# Patient Record
Sex: FEMALE | NOT HISPANIC OR LATINO | Employment: OTHER | URBAN - METROPOLITAN AREA
[De-identification: names, ages, dates, MRNs, and addresses within clinical notes are randomized per-mention and may not be internally consistent; named-entity substitution may affect disease eponyms.]

---

## 2017-01-23 ENCOUNTER — TRANSFERRED RECORDS (OUTPATIENT)
Dept: HEALTH INFORMATION MANAGEMENT | Facility: CLINIC | Age: 72
End: 2017-01-23

## 2017-02-15 ENCOUNTER — OFFICE VISIT (OUTPATIENT)
Dept: URGENT CARE | Facility: URGENT CARE | Age: 72
End: 2017-02-15
Payer: COMMERCIAL

## 2017-02-15 VITALS
DIASTOLIC BLOOD PRESSURE: 78 MMHG | WEIGHT: 191 LBS | TEMPERATURE: 98 F | OXYGEN SATURATION: 91 % | HEART RATE: 85 BPM | SYSTOLIC BLOOD PRESSURE: 156 MMHG | BODY MASS INDEX: 31.82 KG/M2
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
## 2017-02-15 DIAGNOSIS — S51.851A CAT BITE OF RIGHT FOREARM, INITIAL ENCOUNTER: Primary | ICD-10-CM

## 2017-02-15 PROCEDURE — 90471 IMMUNIZATION ADMIN: CPT | Performed by: NURSE PRACTITIONER

## 2017-02-15 PROCEDURE — 99214 OFFICE O/P EST MOD 30 MIN: CPT | Mod: 25 | Performed by: NURSE PRACTITIONER

## 2017-02-15 PROCEDURE — 90715 TDAP VACCINE 7 YRS/> IM: CPT | Performed by: NURSE PRACTITIONER

## 2017-02-15 RX ORDER — IBUPROFEN 400 MG/1
400 TABLET, FILM COATED ORAL EVERY 6 HOURS PRN
Qty: 28 TABLET | Refills: 0 | Status: SHIPPED | OUTPATIENT
Start: 2017-02-15 | End: 2017-02-22

## 2017-02-15 ASSESSMENT — PAIN SCALES - GENERAL: PAINLEVEL: WORST PAIN (10)

## 2017-02-15 NOTE — MR AVS SNAPSHOT
After Visit Summary   2/15/2017    Negin Jonas    MRN: [id number]           Patient Information     Date Of Birth          1945        Visit Information        Provider Department      2/15/2017 4:10 PM Kiesha Desai NP [facility]        Today's Diagnoses     Cat bite of right forearm, initial encounter    -  1      Care Instructions      Cat Bite    A cat bite can cause a wound deep enough to break the skin. In such cases, the wound is cleaned and then closed. Sometimes, the wound is not closed completely. This is so that fluid can drain if the wound becomes infected. In addition to wound care, a tetanus shot may be given, if needed.  Home Care    Wash your hands well with soap and warm water before and after caring for the wound. This helps lower the risk of infection.    Care for the wound as directed. If a dressing was applied to the wound, be sure to change it as directed.    If the wound bleeds, place a clean, soft cloth on the wound. Then firmly apply pressure until the bleeding stops. This may take up to 5 minutes. Do not release the pressure and look at the wound during this time.    Most wounds heal within 10 days. But an infection can occur even with proper treatment. So be sure to check the wound daily for signs of infection (see below).    Antibiotics may be prescribed. These help prevent or treat infection. If you re given antibiotics, take them as directed. Also be sure to complete the medications.  Rabies Prevention   Rabies is a virus that can be carried in certain animals. These can include domestic animals such as cats and dogs. Pets fully vaccinated against rabies (2 shots) are at very low risk of infection. But because human rabies is almost always fatal, any biting pet should be confined for 10 days as an extra precaution. In general, if there is a risk for rabies, the following steps may need to be taken:    If someone s pet cat has bitten you, it  should be kept in a secure area for the next 10 days to watch for signs of illness. (If the pet owner won t allow this, contact your local animal control center.) If the cat becomes ill or dies during that time, contact your local animal control center at once so the animal may be tested for rabies. If the cat stays healthy for the next 10 days, there is no danger of rabies in the animal or you.    If a stray cat bit you, contact your local animal control center. They can give information on capture, quarantine, and animal rabies testing.    If you can t locate the animal that bit you in the next 2 days, and if rabies exists in your region, you may need to receive the rabies vaccine series. Call your health care provider right away. Or, return to the emergency department promptly.    All animal bites should be reported to the local animal control center. If you were not given a form to fill out, you can report this yourself.  Follow-up care  Follow up with your health care provider, or as directed.  When to seek medical advice  Call your health care provider right away if any of these occur:    Signs of infection:    Spreading redness or warmth from the wound    Increased pain or swelling    Fever of 100.4 F (38 C) or higher, or as directed by your health care provider    Colored fluid or pus draining from the wound    Signs of rabies infection:    Headache    Confusion    Strange behavior    Seizure    Decreased ability to move any body part near the bite area    Bleeding that cannot be stopped after 5 minutes of firm pressure              1594-6496 [employer]. [address] [place] [address] 10428. All rights reserved. This information is not intended as a substitute for professional medical care. Always follow your healthcare professional's instructions.              Follow-ups after your visit        Who to contact     If you have questions or need follow up information about today's clinic  "visit or your schedule please contact [facility] directly at [phone].  Normal or non-critical lab and imaging results will be communicated to you by MyChart, letter or phone within 4 business days after the clinic has received the results. If you do not hear from us within 7 days, please contact the clinic through [employer] or phone. If you have a critical or abnormal lab result, we will notify you by phone as soon as possible.  Submit refill requests through [employer] or call your pharmacy and they will forward the refill request to us. Please allow 3 business days for your refill to be completed.          Additional Information About Your Visit        MyChart Information     [employer] lets you send messages to your doctor, view your test results, renew your prescriptions, schedule appointments and more. To sign up, go to [email or web address] . Click on \"Log in\" on the left side of the screen, which will take you to the Welcome page. Then click on \"Sign up Now\" on the right side of the page.     You will be asked to enter the access code listed below, as well as some personal information. Please follow the directions to create your username and password.     Your access code is: [email or web address]  Expires: 2017  9:23 AM     Your access code will  in 90 days. If you need help or a new code, please call your [facility] clinic or [phone].        Care EveryWhere ID     This is your Care EveryWhere ID. This could be used by other organizations to access your [facility] medical records  [id number]        Your Vitals Were     Pulse Temperature Pulse Oximetry BMI (Body Mass Index)          85 98  F (36.7  C) (Oral) 91% 31.82 kg/m2         Blood Pressure from Last 3 Encounters:   02/15/17 156/78   16 126/78   16 136/88    Weight from Last 3 Encounters:   02/15/17 191 lb (86.6 kg)   16 197 lb (89.4 kg)   16 191 lb (86.6 kg)              We Performed the Following     " TDAP (ADACEL AGES 11-64)          Today's Medication Changes          These changes are accurate as of: 2/15/17  6:57 PM.  If you have any questions, ask your nurse or doctor.               Start taking these medicines.        Dose/Directions    amoxicillin-clavulanate 875-125 MG per tablet   Commonly known as:  AUGMENTIN   Used for:  Cat bite of right forearm, initial encounter   Started by:  Kiesha Desai NP        Dose:  1 tablet   Take 1 tablet by mouth 2 times daily for 7 days   Quantity:  14 tablet   Refills:  0       ibuprofen 400 MG tablet   Commonly known as:  ADVIL/MOTRIN   Used for:  Cat bite of right forearm, initial encounter   Started by:  Kiesha Desai NP        Dose:  400 mg   Take 1 tablet (400 mg) by mouth every 6 hours as needed for moderate pain   Quantity:  28 tablet   Refills:  0            Where to get your medicines      These medications were sent to [id number] - [place], [place] - [address]  [address] [address]     Phone:  [phone]     amoxicillin-clavulanate 875-125 MG per tablet    ibuprofen 400 MG tablet                Primary Care Provider Office Phone # Fax #    Cassy Macario PA-C [phone]       [facility] [address]  [facility] [address]        Thank you!     Thank you for choosing [facility]  for your care. Our goal is always to provide you with excellent care. Hearing back from our patients is one way we can continue to improve our services. Please take a few minutes to complete the written survey that you may receive in the mail after your visit with us. Thank you!             Your Updated Medication List - Protect others around you: Learn how to safely use, store and throw away your medicines at www.disposemymeds.org.          This list is accurate as of: 2/15/17  6:57 PM.  Always use your most recent med list.                   Brand Name Dispense Instructions for use     amoxicillin-clavulanate 875-125 MG per tablet    AUGMENTIN    14 tablet    Take 1 tablet by mouth 2 times daily for 7 days       aspirin 81 MG EC tablet    ASPIRIN ADULT LOW STRENGTH    90 tablet    Take 1 tablet (81 mg) by mouth daily       citalopram 20 MG tablet    celeXA    90 tablet    Take 1 tablet (20 mg) by mouth daily For anxiety.       fluticasone 50 MCG/ACT spray    FLONASE    16 g    Spray 1-2 sprays into both nostrils daily For nasal allergies       ibuprofen 400 MG tablet    ADVIL/MOTRIN    28 tablet    Take 1 tablet (400 mg) by mouth every 6 hours as needed for moderate pain       lamoTRIgine 200 MG tablet    LaMICtal     Reported on 2/15/2017       omeprazole 20 MG CR capsule    priLOSEC    180 capsule    Take 2 capsules (40 mg) by mouth daily       triamcinolone 0.1 % cream    KENALOG    80 g    APPLY TOPICALLY TWICE DAILY as needed for a maximum of 14 days.

## 2017-02-16 NOTE — PATIENT INSTRUCTIONS
Cat Bite    A cat bite can cause a wound deep enough to break the skin. In such cases, the wound is cleaned and then closed. Sometimes, the wound is not closed completely. This is so that fluid can drain if the wound becomes infected. In addition to wound care, a tetanus shot may be given, if needed.  Home Care    Wash your hands well with soap and warm water before and after caring for the wound. This helps lower the risk of infection.    Care for the wound as directed. If a dressing was applied to the wound, be sure to change it as directed.    If the wound bleeds, place a clean, soft cloth on the wound. Then firmly apply pressure until the bleeding stops. This may take up to 5 minutes. Do not release the pressure and look at the wound during this time.    Most wounds heal within 10 days. But an infection can occur even with proper treatment. So be sure to check the wound daily for signs of infection (see below).    Antibiotics may be prescribed. These help prevent or treat infection. If you re given antibiotics, take them as directed. Also be sure to complete the medications.  Rabies Prevention   Rabies is a virus that can be carried in certain animals. These can include domestic animals such as cats and dogs. Pets fully vaccinated against rabies (2 shots) are at very low risk of infection. But because human rabies is almost always fatal, any biting pet should be confined for 10 days as an extra precaution. In general, if there is a risk for rabies, the following steps may need to be taken:    If someone s pet cat has bitten you, it should be kept in a secure area for the next 10 days to watch for signs of illness. (If the pet owner won t allow this, contact your local animal control center.) If the cat becomes ill or dies during that time, contact your local animal control center at once so the animal may be tested for rabies. If the cat stays healthy for the next 10 days, there is no danger of rabies in the  animal or you.    If a stray cat bit you, contact your local animal control center. They can give information on capture, quarantine, and animal rabies testing.    If you can t locate the animal that bit you in the next 2 days, and if rabies exists in your region, you may need to receive the rabies vaccine series. Call your health care provider right away. Or, return to the emergency department promptly.    All animal bites should be reported to the local animal control center. If you were not given a form to fill out, you can report this yourself.  Follow-up care  Follow up with your health care provider, or as directed.  When to seek medical advice  Call your health care provider right away if any of these occur:    Signs of infection:    Spreading redness or warmth from the wound    Increased pain or swelling    Fever of 100.4 F (38 C) or higher, or as directed by your health care provider    Colored fluid or pus draining from the wound    Signs of rabies infection:    Headache    Confusion    Strange behavior    Seizure    Decreased ability to move any body part near the bite area    Bleeding that cannot be stopped after 5 minutes of firm pressure              [id number] [employer]. [address] [place] [address]60. All rights reserved. This information is not intended as a substitute for professional medical care. Always follow your healthcare professional's instructions.

## 2017-02-16 NOTE — NURSING NOTE
"Chief Complaint   Patient presents with     Cat Bite     this morning and got worse- sister's cat and update on shots       Initial /78 (BP Location: Left arm, Patient Position: Chair, Cuff Size: Adult Regular)  Pulse 85  Temp 98  F (36.7  C) (Oral)  Wt 191 lb (86.6 kg)  SpO2 91%  BMI 31.82 kg/m2 Estimated body mass index is 31.82 kg/(m^2) as calculated from the following:    Height as of 11/25/16: 5' 4.96\" (1.65 m).    Weight as of this encounter: 191 lb (86.6 kg).  Medication Reconciliation: complete   Aga Luevano CMA      "

## 2017-02-16 NOTE — PROGRESS NOTES
SUBJECTIVE:                                                    Negin Jonas is a 71 year old female who presents to clinic today for the following health issues:      Cat bite      Duration: today    Description (location/character/radiation): right forearm    Intensity:  10/10    Accompanying signs and symptoms: swelling up the arm    History (similar episodes/previous evaluation): None    Precipitating or alleviating factors: None    Therapies tried and outcome: ice josiah            Allergies   Allergen Reactions     Codeine Sulfate        Past Medical History   Diagnosis Date     Advanced directives, counseling/discussion 3/7/2012     Anxiety attack 2006     EEG normal, improved with lamictal     Chronic rhinitis 1/31/2011     Closed head injury 2004     subarachnoid hemorrage/cerebral contusion with sequellae of significant diminished short term memory, word finding difficulties and emotional lability, was in coma for 2 months, on seizure prophylaxsis with lamictal, f/b neuro annually     Generalized anxiety disorder      Major depression      Major depression in complete remission (H) 1/31/2011     was seen by psychiatrist in past, has tried Effexor and Seroquel and Hiram's Wart     Migraine headache      resolved s/p closed head injury     Panic attacks      Psoriasis 1/31/2011     Short-term memory loss 2004     secondary to closed head injury         Current Outpatient Prescriptions on File Prior to Visit:  citalopram (CELEXA) 20 MG tablet Take 1 tablet (20 mg) by mouth daily For anxiety.   omeprazole (PRILOSEC) 20 MG capsule Take 2 capsules (40 mg) by mouth daily   triamcinolone (KENALOG) 0.1 % cream APPLY TOPICALLY TWICE DAILY as needed for a maximum of 14 days.   fluticasone (FLONASE) 50 MCG/ACT nasal spray Spray 1-2 sprays into both nostrils daily For nasal allergies   aspirin (ASPIRIN ADULT LOW STRENGTH) 81 MG EC tablet Take 1 tablet (81 mg) by mouth daily   lamoTRIgine (LAMICTAL) 200 MG tablet  Reported on 2/15/2017     No current facility-administered medications on file prior to visit.     Social History   Substance Use Topics     Smoking status: Former Smoker     Smokeless tobacco: Never Used      Comment: Quit 15 years ago.     Alcohol use No       ROS:  General: negative for fever  SKIN: + as above    Physcial Exam:  /78 (BP Location: Left arm, Patient Position: Chair, Cuff Size: Adult Regular)  Pulse 85  Temp 98  F (36.7  C) (Oral)  Wt 191 lb (86.6 kg)  SpO2 91%  BMI 31.82 kg/m2    GENERAL: alert, no acute distress  EYES: conjunctival clear  RESP: Regular breathing rate  NEURO: awake .  SKIN: multiple scratches on the right forearm, very tender to touch, swollen and erythema    ASSESSMENT:    ICD-10-CM    1. Cat bite of right forearm, initial encounter S51.851A amoxicillin-clavulanate (AUGMENTIN) 875-125 MG per tablet    W55.01XA TDAP (ADACEL AGES 11-64)     ibuprofen (ADVIL/MOTRIN) 400 MG tablet       PLAN:  Patient reports that cat had a rabies vaccinations/ shots before  See today's orders.  Follow-up with primary clinic if not improving.  Advised about symptoms which might herald more serious problems.    Kiesha Desai  [facility]-BC  Family Nurse Practitoner

## 2017-04-28 DIAGNOSIS — F41.1 GENERALIZED ANXIETY DISORDER: ICD-10-CM

## 2017-04-28 DIAGNOSIS — L40.9 PSORIASIS: ICD-10-CM

## 2017-04-28 NOTE — TELEPHONE ENCOUNTER
triamcinolone (KENALOG) 0.1 % cream      Last Written Prescription Date: 11/10/16  Last Fill Quantity: 80g,  # refills: 0   Last Office Visit with [facility], [facility] or [facility] prescribing provider: 11/25/16                                               [facility]

## 2017-05-01 NOTE — TELEPHONE ENCOUNTER
aspirin (ASPIRIN ADULT LOW STRENGTH) 81 MG EC tablet      Last Written Prescription Date: 07/27/16  Last Fill Quantity: 90, # refills: 1  Last Office Visit with [facility], [facility] or [facility] prescribing provider: 11/25/16        BP Readings from Last 3 Encounters:   02/15/17 156/78   11/25/16 126/78   08/29/16 136/88     Lab Results   Component Value Date    AST 16 07/28/2016     Lab Results   Component Value Date    ALT 25 07/28/2016     Creatinine   Date Value Ref Range Status   07/28/2016 0.76 0.52 - 1.04 mg/dL Final

## 2017-05-02 NOTE — TELEPHONE ENCOUNTER
Routing refill request to provider for review/approval because:  Has been over a year since seen for psoriasis    VLg [place], Clinical RN Laura Dia.

## 2017-05-03 RX ORDER — TRIAMCINOLONE ACETONIDE 1 MG/G
CREAM TOPICAL
Qty: 80 G | Refills: 0 | Status: SHIPPED | OUTPATIENT
Start: 2017-05-03 | End: 2018-01-19

## 2017-10-20 DIAGNOSIS — F33.41 RECURRENT MAJOR DEPRESSIVE DISORDER, IN PARTIAL REMISSION (H): ICD-10-CM

## 2017-10-20 DIAGNOSIS — F41.1 GENERALIZED ANXIETY DISORDER: ICD-10-CM

## 2017-10-20 NOTE — LETTER
Negin Jonas  [address]  [facility] [address]          10/26/17      Dear Negin Jonas        At [facility] we care about your health and are committed to providing quality patient care. Regular appointments are a vital part of the care and management of your health and can help prevent many of the complications that can occur.      It has come to our attention that you were given a fernie refill on your medication and an office visit is due before your next refill is needed. Please call [facility] at [phone] soon to schedule your appointment.    If you have transferred care to another clinic please call to inform us so that we do not continue to send you reminder letters.      Sincerely,      [facility] Care Team

## 2017-10-26 RX ORDER — CITALOPRAM HYDROBROMIDE 20 MG/1
TABLET ORAL
Qty: 30 TABLET | Refills: 0 | Status: SHIPPED | OUTPATIENT
Start: 2017-10-26 | End: 2017-11-13

## 2017-10-26 NOTE — TELEPHONE ENCOUNTER
This writer attempted to contact Negin on 10/26/17      Reason for call Left voicemail message that fernie refill given and an appointment needs to be scheduled before the next refill is needed and left message to return call.      If patient calls back:   Schedule Office Visit appointment within 2 weeks with primary care.    Sent letter as well.    Brittany Kathleen MA

## 2017-10-26 NOTE — TELEPHONE ENCOUNTER
Medication is being filled for 1 time refill only due to:  Patient needs to be seen because due for appointment for further refills..   send letter.  Katia Benitez RN

## 2017-11-13 ENCOUNTER — OFFICE VISIT (OUTPATIENT)
Dept: FAMILY MEDICINE | Facility: CLINIC | Age: 72
End: 2017-11-13
Payer: COMMERCIAL

## 2017-11-13 VITALS
SYSTOLIC BLOOD PRESSURE: 116 MMHG | HEIGHT: 65 IN | HEART RATE: 71 BPM | BODY MASS INDEX: 26.99 KG/M2 | WEIGHT: 162 LBS | OXYGEN SATURATION: 99 % | TEMPERATURE: 97.8 F | DIASTOLIC BLOOD PRESSURE: 67 MMHG

## 2017-11-13 DIAGNOSIS — F33.41 RECURRENT MAJOR DEPRESSIVE DISORDER, IN PARTIAL REMISSION (H): ICD-10-CM

## 2017-11-13 DIAGNOSIS — Z91.81 AT RISK FOR FALLING: ICD-10-CM

## 2017-11-13 DIAGNOSIS — R63.4 WEIGHT LOSS, UNINTENTIONAL: ICD-10-CM

## 2017-11-13 DIAGNOSIS — F41.1 GENERALIZED ANXIETY DISORDER: ICD-10-CM

## 2017-11-13 DIAGNOSIS — G40.909 SEIZURE DISORDER (H): Primary | ICD-10-CM

## 2017-11-13 DIAGNOSIS — Z12.11 SCREEN FOR COLON CANCER: ICD-10-CM

## 2017-11-13 DIAGNOSIS — S06.9X9S TRAUMATIC BRAIN INJURY WITH LOSS OF CONSCIOUSNESS, SEQUELA (H): ICD-10-CM

## 2017-11-13 DIAGNOSIS — E78.5 HYPERLIPIDEMIA LDL GOAL <160: ICD-10-CM

## 2017-11-13 DIAGNOSIS — R41.3 SHORT-TERM MEMORY LOSS: ICD-10-CM

## 2017-11-13 PROCEDURE — 99214 OFFICE O/P EST MOD 30 MIN: CPT | Performed by: FAMILY MEDICINE

## 2017-11-13 RX ORDER — CITALOPRAM HYDROBROMIDE 20 MG/1
20 TABLET ORAL DAILY
Qty: 90 TABLET | Refills: 3 | Status: SHIPPED | OUTPATIENT
Start: 2017-11-13 | End: 2018-11-13

## 2017-11-13 RX ORDER — LAMOTRIGINE 200 MG/1
200 TABLET ORAL 2 TIMES DAILY
Qty: 60 TABLET | Refills: 11
Start: 2017-11-13 | End: 2019-03-21

## 2017-11-13 ASSESSMENT — PAIN SCALES - GENERAL: PAINLEVEL: NO PAIN (0)

## 2017-11-13 ASSESSMENT — PATIENT HEALTH QUESTIONNAIRE - PHQ9: SUM OF ALL RESPONSES TO PHQ QUESTIONS 1-9: 1

## 2017-11-13 NOTE — LETTER
My Depression Action Plan  Name: Negin Jonas   Date of Birth 1945  Date: 11/13/2017    My doctor: Cassy Macario   My clinic: [address] [id number] [phone]          GREEN    ZONE   Good Control    What it looks like:     Things are going generally well. You have normal up s and down s. You may even feel depressed from time to time, but bad moods usually last less than a day.   What you need to do:  1. Continue to care for yourself (see self care plan)  2. Check your depression survival kit and update it as needed  3. Follow your physician s recommendations including any medication.  4. Do not stop taking medication unless you consult with your physician first.           YELLOW         ZONE Getting Worse    What it looks like:     Depression is starting to interfere with your life.     It may be hard to get out of bed; you may be starting to isolate yourself from others.    Symptoms of depression are starting to last most all day and this has happened for several days.     You may have suicidal thoughts but they are not constant.   What you need to do:     1. Call your care team, your response to treatment will improve if you keep your care team informed of your progress. Yellow periods are signs an adjustment may need to be made.     2. Continue your self-care, even if you have to fake it!    3. Talk to someone in your support network    4. Open up your depression survival kit           RED    ZONE Medical Alert - Get Help    What it looks like:     Depression is seriously interfering with your life.     You may experience these or other symptoms: You can t get out of bed most days, can t work or engage in other necessary activities, you have trouble taking care of basic hygiene, or basic responsibilities, thoughts of suicide or death that will not go away, self-injurious behavior.     What you need to do:  1. Call  your care team and request a same-day appointment. If they are not available (weekends or after hours) call your local crisis line, emergency room or 911.      Electronically signed by: Marlyn Arboleda, November 13, 2017    Depression Self Care Plan / Survival Kit    Self-Care for Depression  Here s the deal. Your body and mind are really not as separate as most people think.  What you do and think affects how you feel and how you feel influences what you do and think. This means if you do things that people who feel good do, it will help you feel better.  Sometimes this is all it takes.  There is also a place for medication and therapy depending on how severe your depression is, so be sure to consult with your medical provider and/ or Behavioral Health Consultant if your symptoms are worsening or not improving.     In order to better manage my stress, I will:    Exercise  Get some form of exercise, every day. This will help reduce pain and release endorphins, the  feel good  chemicals in your brain. This is almost as good as taking antidepressants!  This is not the same as joining a gym and then never going! (they count on that by the way ) It can be as simple as just going for a walk or doing some gardening, anything that will get you moving.      Hygiene   Maintain good hygiene (Get out of bed in the morning, Make your bed, Brush your teeth, Take a shower, and Get dressed like you were going to work, even if you are unemployed).  If your clothes don't fit try to get ones that do.    Diet  I will strive to eat foods that are good for me, drink plenty of water, and avoid excessive sugar, caffeine, alcohol, and other mood-altering substances.  Some foods that are helpful in depression are: complex carbohydrates, B vitamins, flaxseed, fish or fish oil, fresh fruits and vegetables.    Psychotherapy  I agree to participate in Individual Therapy (if recommended).    Medication  If prescribed medications, I agree to  take them.  Missing doses can result in serious side effects.  I understand that drinking alcohol, or other illicit drug use, may cause potential side effects.  I will not stop my medication abruptly without first discussing it with my provider.    Staying Connected With Others  I will stay in touch with my friends, family members, and my primary care provider/team.    Use your imagination  Be creative.  We all have a creative side; it doesn t matter if it s oil painting, sand castles, or mud pies! This will also kick up the endorphins.    Witness Beauty  (AKA stop and smell the roses) Take a look outside, even in mid-winter. Notice colors, textures. Watch the squirrels and birds.     Service to others  Be of service to others.  There is always someone else in need.  By helping others we can  get out of ourselves  and remember the really important things.  This also provides opportunities for practicing all the other parts of the program.    Humor  Laugh and be silly!  Adjust your TV habits for less news and crime-drama and more comedy.    Control your stress  Try breathing deep, massage therapy, biofeedback, and meditation. Find time to relax each day.     My support system    Clinic Contact:  Phone number:    Contact 1:  Phone number:    Contact 2:  Phone number:    Amish/:  Phone number:    Therapist:  Phone number:    Local crisis center:    Phone number:    Other community support:  Phone number:

## 2017-11-13 NOTE — PATIENT INSTRUCTIONS
At [facility], we strive to deliver an exceptional experience to you, every time we see you.  If you receive a survey in the mail, please send us back your thoughts. We really do value your feedback.    Based on your medical history, these are the current health maintenance/preventive care services that you are due for (some may have been done at this visit.)  Health Maintenance Due   Topic Date Due     PHQ-9 Q6 MONTHS  01/27/2017     ADVANCE DIRECTIVE PLANNING Q5 YRS  03/07/2017     INFLUENZA VACCINE (SYSTEM ASSIGNED)  09/01/2017     FALL RISK ASSESSMENT  11/25/2017     DEPRESSION ACTION PLAN Q1 YR  11/25/2017     FIT Q1 YR  11/28/2017         Suggested websites for health information:  Www.[employer].ACTON : Up to date and easily searchable information on multiple topics.  Www.medlineplus.gov : medication info, interactive tutorials, watch real surgeries online  Www.familydoctor.org : good info from the Academy of Family Physicians  Www.cdc.gov : public health info, travel advisories, epidemics (H1N1)  Www.aap.org : children's health info, normal development, vaccinations  Www.health.[facility].[place].us : [place] dept of health, public health issues in [place], N1N1    Your care team:                            Family Medicine Internal Medicine   MD Kurt Bee MD Shantel Branch-Fleming, MD Katya Georgiev PA-C Nam Ho, MD Pediatrics   ERIKA Santos, LATASHA Colvin APRN MD Dorene Wayne MD Deborah Mielke, MD Kim Thein, APRN CNP      Clinic hours: Monday - Thursday 7 am-7 pm; Fridays 7 am-5 pm.   Urgent care: Monday - Friday 11 am-9 pm; Saturday and Sunday 9 am-5 pm.  Pharmacy : Monday -Thursday 8 am-8 pm; Friday 8 am-6 pm; Saturday and Sunday 9 am-5 pm.     Clinic: [phone]   Pharmacy: [phone]  Coping with Memory Loss  What happens when you have memory loss?  Memory loss causes problems with thinking, learning and memory.  "You may feel forgetful or have trouble focusing on tasks.  Memory loss may be a side effect of medicine, chemotherapy or radiation. In these cases, problems with memory may improve after you finish your treatment. But you could have long-term problems.  Other factors that affect memory and your ability to focus include:    Aging    Illness    Depression    Hormonal changes    Anemia (low red blood cells)    Stress.  What can I do to treat or prevent memory loss?  Problems with thinking and memory can be very frustrating. There is no standard treatment at this time. But there are things you can do to reduce the effects of memory loss:    Really pay attention. Use your eyes, ears and sense of touch to remember things. Focus when someone tells you something.    Limit distractions when you need to complete tasks that require you to focus.    Tell yourself what you are doing as you do it. For example, if you're going out for a few hours, as you close the door tell yourself, \"I'm locking the door now and putting the key in my pocket so I don't have to worry about whether I locked the door.\"    Give yourself clear reminders. If you need to buy dog food, put the empty bag at the door so you'll see it as you leave the house. Or write yourself a note and put it where it's needed.    Keep things in the same place. This way you don't have to wonder where you put your keys, remote control or medicine.    Keep a journal of daily events and activities. Carry a notebook and write down important information that you want to remember.    Use helpful tools, such as:    A daily planner    A wall calendar    Checklists    Sticky notes    A  near the door    A pre-programmed phone    A wristwatch with an alarm    A pill box to keep track of your medicines.    Get plenty of sleep and exercise each day.    Stay positive, and try to manage stress.    If you think you may be depressed, talk to your doctor. Depression should be " treated.  When should I call my care team?  Call your care team if:    You feel depressed.    You cannot complete basic daily activities.  Comments:  __________________________________________  __________________________________________  __________________________________________  __________________________________________  __________________________________________  __________________________________________  __________________________________________  For informational purposes only. Not to replace the advice of your health care provider.  Copyright   2006 [facility]. All rights reserved. [employer] 825699   REV 03/16.    Anxiety Reaction  Anxiety is the feeling we all get when we think something bad might happen. It is a normal response to stress and usually causes only a mild reaction. When anxiety becomes more severe, it can interfere with daily life. In some cases, you may not even be aware of what it is you re anxious about. There may also be a genetic link or it may be a learned behavior in the home.  Both psychological and physical triggers cause stress reaction. It's often a response to fear or emotional stress, real or imagined. This stress may come from home, family, work, or social relationships.  During an anxiety reaction, you may feel:    Helpless    Nervous    Depressed    Irritable  Your body may show signs of anxiety in many ways. You may experience:    Dry mouth    Shakiness    Dizziness    Weakness    Trouble breathing    Breathing fast (hyperventilating)    Chest pressure    Sweating    Headache    Nausea    Diarrhea    Tiredness    Inability to sleep    Sexual problems  Home care    Try to locate the sources of stress in your life. They may not be obvious. These may include:    Daily hassles of life (traffic jams, missed appointments, car troubles, etc.)    Major life changes, both good (new baby, job promotion) and bad (loss of job, loss of loved one)    Overload: feeling that  you have too many responsibilities and can't take care of all of them at once    Feeling helpless, feeling that your problems are beyond what you re able to solve    Notice how your body reacts to stress. Learn to listen to your body signals. This will help you take action before the stress becomes severe.    When you can, do something about the source of your stress. (Avoid hassles, limit the amount of change that happens in your life at one time and take a break when you feel overloaded).    Unfortunately, many stressful situations can't be avoided. It is necessary to learn how to better manage stress. There are many proven methods that will reduce your anxiety. These include simple things like exercise, good nutrition and adequate rest. Also, there are certain techniques that are helpful:    Relaxation    Breathing exercises    Visualization    Biofeedback    Meditation  For more information about this, consult your doctor or go to a local bookstore and review the many books and tapes available on this subject.  Follow-up care  If you feel that your anxiety is not responding to self-help measures, contact your doctor or make an appointment with a counselor. You may need short-term psychological counseling and temporary medicine to help you manage stress.  Call 911  Call your healthcare provider right away if any of these occur:    Trouble breathing    Confusion    Drowsiness or trouble wakening    Fainting or loss of consciousness    Rapid heart rate    Seizure    New chest pain that becomes more severe, lasts longer, or spreads into your shoulder, arm, neck, jaw, or back  When to seek medical advice  Call your healthcare provider right away if any of these occur:    Your symptoms get worse    Severe headache not relieved by rest and mild pain reliever  Date Last Reviewed: 9/29/2015 2000-2017 [employer]. [address] [place] [address], [place] [address]. All rights reserved. This information is not  intended as a substitute for professional medical care. Always follow your healthcare professional's instructions.

## 2017-11-13 NOTE — PROGRESS NOTES
SUBJECTIVE:   Negin Jonas is a 72 year old female who presents to clinic today for the following health issues:    Depression and Anxiety Follow-Up    Status since last visit: Same    Other associated symptoms:None    Complicating factors:     Significant life event: Closed head injury 2004    Current substance abuse: None    PHQ-9 Score and MyChart F/U Questions 12/8/2015 7/27/2016 11/13/2017   Total Score 2 5 1   Q9: Suicide Ideation Not at all Not at all Not at all     EWA-7 SCORE 7/23/2013 8/31/2015 7/27/2016   Total Score 6 - -   Total Score - 6 12       PHQ-9  English  PHQ-9   Any Language  GAD7  Suicide Assessment Five-step Evaluation and Treatment (SAFE-T)      Amount of exercise or physical activity: 6-7 days/week for an average of 30 minutes    Problems taking medications regularly: No    Medication side effects: none    Diet: Avoiding greasy/fatty foods, increased vegetables      Hyperlipidemia Follow-Up      Rate your low fat/cholesterol diet?: good    Taking statin?  Yes, no muscle aches from statin    Other lipid medications/supplements?:  none          Problem list and histories reviewed & adjusted, as indicated.  Additional history: as documented    Patient Active Problem List   Diagnosis     CARDIOVASCULAR SCREENING; LDL GOAL LESS THAN 160     Chronic rhinitis     Psoriasis     Short-term memory loss     Generalized anxiety disorder     Panic attacks     Advanced directives, counseling/discussion     Health Care Home     Bursitis of right knee     Essential and other specified forms of tremor     Traumatic brain injury (H)     Vertigo     Seizure disorder (H)     Recurrent major depressive disorder, in partial remission (H)     Adhesive capsulitis of both shoulders     Past Surgical History:   Procedure Laterality Date     ESOPHAGOSCOPY, GASTROSCOPY, DUODENOSCOPY (EGD), COMBINED N/A 12/17/2015    Procedure: COMBINED ESOPHAGOSCOPY, GASTROSCOPY, DUODENOSCOPY (EGD);  Surgeon: Duane, William  MD Musa;  Location: [facility] OR     ESOPHAGOSCOPY, GASTROSCOPY, DUODENOSCOPY (EGD), COMBINED N/A 12/17/2015    Procedure: COMBINED ESOPHAGOSCOPY, GASTROSCOPY, DUODENOSCOPY (EGD), BIOPSY SINGLE OR MULTIPLE;  Surgeon: Duane, William Charles, MD;  Location: [facility] OR     HC REMOVAL GALLBLADDER       HYSTERECTOMY, PAP NO LONGER INDICATED  1992    with ovaries     RELEASE CARPAL TUNNEL BILATERAL         Social History   Substance Use Topics     Smoking status: Former Smoker     Smokeless tobacco: Never Used      Comment: Quit 15 years ago.     Alcohol use No     Family History   Problem Relation Age of Onset     C.A.D. Father 70     CABG x 3      Respiratory Father      emphysema     Psychotic Disorder Father      depression     DIABETES Mother      Psychotic Disorder Mother      depression     DIABETES Maternal Grandfather      Hypertension Sister      Anxiety Disorder Sister      Respiratory Sister      emphysema         Current Outpatient Prescriptions   Medication Sig Dispense Refill     citalopram (CELEXA) 20 MG tablet Take 1 tablet (20 mg) by mouth daily 90 tablet 3     lamoTRIgine (LAMICTAL) 200 MG tablet Take 1 tablet (200 mg) by mouth 2 times daily Reported on 2/15/2017 60 tablet 11     triamcinolone (KENALOG) 0.1 % cream apply topically twice a day as needed for a maximum of 14 days 80 g 0     aspirin (ASPIRIN ADULT LOW STRENGTH) 81 MG EC tablet Take 1 tablet (81 mg) by mouth daily 90 tablet 1     omeprazole (PRILOSEC) 20 MG capsule Take 2 capsules (40 mg) by mouth daily 180 capsule 1     fluticasone (FLONASE) 50 MCG/ACT nasal spray Spray 1-2 sprays into both nostrils daily For nasal allergies 16 g 3     [DISCONTINUED] citalopram (CELEXA) 20 MG tablet TAKE ONE TABLET BY MOUTH EVERY DAY FOR ANXIETY 30 tablet 0     [DISCONTINUED] lamoTRIgine (LAMICTAL) 200 MG tablet Reported on 2/15/2017  11     Allergies   Allergen Reactions     Codeine Sulfate      Recent Labs   Lab Test  07/28/16   0813  10/06/14   1007  03/08/12    "0909   LDL  141*  140*  123   HDL  79  82  74   TRIG  89  66  80   ALT  25  24   --    CR  0.76  0.70  0.82   GFRESTIMATED  76  84  70   GFRESTBLACK  >90   GFR Calc    >90   GFR Calc    85   POTASSIUM  4.2  4.4   --       BP Readings from Last 3 Encounters:   11/13/17 116/67   02/15/17 156/78   11/25/16 126/78    Wt Readings from Last 3 Encounters:   11/13/17 162 lb (73.5 kg)   02/15/17 191 lb (86.6 kg)   11/25/16 197 lb (89.4 kg)                  Labs reviewed in EPIC          Reviewed and updated as needed this visit by clinical staffTobacco  Allergies  Meds  Med Hx  Surg Hx  Fam Hx  Soc Hx      Reviewed and updated as needed this visit by Provider         ROS:  Constitutional, HEENT, cardiovascular, pulmonary, gi and gu systems are negative, except as otherwise noted.      OBJECTIVE:   /67 (BP Location: Right arm, Patient Position: Chair, Cuff Size: Adult Regular)  Pulse 71  Temp 97.8  F (36.6  C) (Oral)  Ht 5' 4.96\" (1.65 m)  Wt 162 lb (73.5 kg)  SpO2 99%  Breastfeeding? No  BMI 26.99 kg/m2  Body mass index is 26.99 kg/(m^2).  GENERAL APPEARANCE: healthy, alert and no distress  EYES: Eyes grossly normal to inspection, PERRL and conjunctivae and sclerae normal  HENT: ear canals and TM's normal, nose and mouth without ulcers or lesions, oropharynx clear and oral mucous membranes moist  NECK: no adenopathy, no asymmetry, masses, or scars and thyroid normal to palpation  RESP: lungs clear to auscultation - no rales, rhonchi or wheezes  CV: regular rates and rhythm, normal S1 S2, no S3 or S4, no murmur, click or rub, no peripheral edema and peripheral pulses strong  ABDOMEN: soft, nontender, no hepatosplenomegaly, no masses and bowel sounds normal  MS: no musculoskeletal defects are noted and gait is age appropriate without ataxia  SKIN: no suspicious lesions or rashes, multiple tattoos.   NEURO: Normal strength and tone, sensory exam grossly normal, mentation unable " "to remember recent events. Turns to  for some of her answers. Does not seem too upset and tends to use humor to deflect questions.   PSYCH: mentation appears cognitively mildly impaired and affect normal/bright     Diagnostic Test Results:  No results found for this or any previous visit (from the past 24 hour(s)).    ASSESSMENT/PLAN:         Tobacco Cessation:   reports that she has quit smoking. She has never used smokeless tobacco.      BMI:   Estimated body mass index is 26.99 kg/(m^2) as calculated from the following:    Height as of this encounter: 5' 4.96\" (1.65 m).    Weight as of this encounter: 162 lb (73.5 kg).               ICD-10-CM    1. Seizure disorder (H)- neurology wrote that they do not think she has a true seizure disorder but will continue medication as a prophylactic and for mood stabilization G40.909 lamoTRIgine (LAMICTAL) 200 MG tablet     CBC with platelets     Comprehensive metabolic panel   2. Recurrent major depressive disorder, in partial remission (H) F33.41 citalopram (CELEXA) 20 MG tablet daily and seems to be doing well with depression but not anxiety.   3. Traumatic brain injury with loss of consciousness, sequela (H) S06.9X9S Short term memory loss since accident in 2004 and may be at risk for dementia due to encephalomalacia.    4. Generalized anxiety disorder F41.1 citalopram (CELEXA) 20 MG tablet- continue daily with neurology follow up    5. Screen for colon cancer Z12.11 Fecal colorectal cancer screen FIT - Future (S+30)- declines colonoscopy   6. Hyperlipidemia LDL goal <160 E78.5 Lipid panel reflex to direct LDL Fasting- not fasting today   7. At risk for falling Z91.81 No falls   8. Short-term memory loss R41.3 Stable per    9. Weight loss, unintentional R63.4 28 pound weight loss in past year. Staying active and eating healthy but not reason for weight loss apparent. Continue to monitor.        CONSULTATION/REFERRAL to neurology for annual follow up in 2 " months  FUTURE LABS:       - Schedule a fasting blood draw in 1-2 weeks.   FUTURE APPOINTMENTS:       - Follow-up visit in 6 months or sooner if any questions or concerns.   Work on weight loss  Regular exercise  See Patient Instructions    Marlyn Arboleda MD  [facility]

## 2017-11-22 DIAGNOSIS — Z12.11 SCREEN FOR COLON CANCER: ICD-10-CM

## 2017-11-22 LAB — HEMOCCULT STL QL IA: NEGATIVE

## 2017-11-22 PROCEDURE — 82274 ASSAY TEST FOR BLOOD FECAL: CPT | Performed by: FAMILY MEDICINE

## 2017-11-22 NOTE — LETTER
November 24, 2017      Negin Jonas  [address]  [place] [address]        Dear Negin,    The lab results from the most recent visit are all normal or in a good range.     There was no blood in the stool. Recheck in 1 year.      Resulted Orders   Fecal colorectal cancer screen FIT - Future (S+30)   Result Value Ref Range    Occult Blood Scn FIT Negative NEG^Negative       Please call me if you have any questions about your condition or care.     Sincerely,      Marlyn Arboleda MD/v

## 2017-11-23 NOTE — PROGRESS NOTES
Dear Negin Jonas,    The lab results from the most recent visit are all normal or in a good range.     There was no blood in the stool. Recheck in 1 year.    Please call me if you have any questions about your condition or care.     Sincerely,    Marlyn Arboleda MD

## 2017-11-24 DIAGNOSIS — G40.909 SEIZURE DISORDER (H): ICD-10-CM

## 2017-11-24 DIAGNOSIS — E78.5 HYPERLIPIDEMIA LDL GOAL <160: ICD-10-CM

## 2017-11-24 LAB
ALBUMIN SERPL-MCNC: 3.5 G/DL (ref 3.4–5)
ALP SERPL-CCNC: 98 U/L (ref 40–150)
ALT SERPL W P-5'-P-CCNC: 22 U/L (ref 0–50)
ANION GAP SERPL CALCULATED.3IONS-SCNC: 8 MMOL/L (ref 3–14)
AST SERPL W P-5'-P-CCNC: 16 U/L (ref 0–45)
BILIRUB SERPL-MCNC: 0.4 MG/DL (ref 0.2–1.3)
BUN SERPL-MCNC: 17 MG/DL (ref 7–30)
CALCIUM SERPL-MCNC: 8.8 MG/DL (ref 8.5–10.1)
CHLORIDE SERPL-SCNC: 104 MMOL/L (ref 94–109)
CHOLEST SERPL-MCNC: 210 MG/DL
CO2 SERPL-SCNC: 30 MMOL/L (ref 20–32)
CREAT SERPL-MCNC: 0.7 MG/DL (ref 0.52–1.04)
ERYTHROCYTE [DISTWIDTH] IN BLOOD BY AUTOMATED COUNT: 13.4 % (ref 10–15)
GFR SERPL CREATININE-BSD FRML MDRD: 83 ML/MIN/1.7M2
GLUCOSE SERPL-MCNC: 83 MG/DL (ref 70–99)
HCT VFR BLD AUTO: 41.5 % (ref 35–47)
HDLC SERPL-MCNC: 98 MG/DL
HGB BLD-MCNC: 13.6 G/DL (ref 11.7–15.7)
LDLC SERPL CALC-MCNC: 100 MG/DL
MCH RBC QN AUTO: 29.2 PG (ref 26.5–33)
MCHC RBC AUTO-ENTMCNC: 32.8 G/DL (ref 31.5–36.5)
MCV RBC AUTO: 89 FL (ref 78–100)
NONHDLC SERPL-MCNC: 112 MG/DL
PLATELET # BLD AUTO: 235 10E9/L (ref 150–450)
POTASSIUM SERPL-SCNC: 4.1 MMOL/L (ref 3.4–5.3)
PROT SERPL-MCNC: 7 G/DL (ref 6.8–8.8)
RBC # BLD AUTO: 4.65 10E12/L (ref 3.8–5.2)
SODIUM SERPL-SCNC: 142 MMOL/L (ref 133–144)
TRIGL SERPL-MCNC: 62 MG/DL
WBC # BLD AUTO: 5.7 10E9/L (ref 4–11)

## 2017-11-24 PROCEDURE — 85027 COMPLETE CBC AUTOMATED: CPT | Performed by: FAMILY MEDICINE

## 2017-11-24 PROCEDURE — 80053 COMPREHEN METABOLIC PANEL: CPT | Performed by: FAMILY MEDICINE

## 2017-11-24 PROCEDURE — 80061 LIPID PANEL: CPT | Performed by: FAMILY MEDICINE

## 2017-11-24 PROCEDURE — 36415 COLL VENOUS BLD VENIPUNCTURE: CPT | Performed by: FAMILY MEDICINE

## 2017-11-24 NOTE — LETTER
November 27, 2017      Negin Jonas  [address]  [place] [address]        Dear Negin,    Your test results are attached. I am happy to let you know that they are stable and your medications can stay the same.    The blood sugar is normal and you do not have diabetes. The kidneys are healthy. The cholesterol also looks good. We can recheck labs in 1 year.       Resulted Orders   CBC with platelets   Result Value Ref Range    WBC 5.7 4.0 - 11.0 10e9/L    RBC Count 4.65 3.8 - 5.2 10e12/L    Hemoglobin 13.6 11.7 - 15.7 g/dL    Hematocrit 41.5 35.0 - 47.0 %    MCV 89 78 - 100 fl    MCH 29.2 26.5 - 33.0 pg    MCHC 32.8 31.5 - 36.5 g/dL    RDW 13.4 10.0 - 15.0 %    Platelet Count 235 150 - 450 10e9/L   Comprehensive metabolic panel   Result Value Ref Range    Sodium 142 133 - 144 mmol/L    Potassium 4.1 3.4 - 5.3 mmol/L    Chloride 104 94 - 109 mmol/L    Carbon Dioxide 30 20 - 32 mmol/L    Anion Gap 8 3 - 14 mmol/L    Glucose 83 70 - 99 mg/dL      Comment:      Fasting specimen    Urea Nitrogen 17 7 - 30 mg/dL    Creatinine 0.70 0.52 - 1.04 mg/dL    GFR Estimate 83 >60 mL/min/1.7m2      Comment:      Non  GFR Calc    GFR Estimate If Black >90 >60 mL/min/1.7m2      Comment:       GFR Calc    Calcium 8.8 8.5 - 10.1 mg/dL    Bilirubin Total 0.4 0.2 - 1.3 mg/dL    Albumin 3.5 3.4 - 5.0 g/dL    Protein Total 7.0 6.8 - 8.8 g/dL    Alkaline Phosphatase 98 40 - 150 U/L    ALT 22 0 - 50 U/L    AST 16 0 - 45 U/L   Lipid panel reflex to direct LDL Fasting   Result Value Ref Range    Cholesterol 210 (H) <200 mg/dL      Comment:      Desirable:       <200 mg/dl    Triglycerides 62 <150 mg/dL      Comment:      Fasting specimen    HDL Cholesterol 98 >49 mg/dL    LDL Cholesterol Calculated 100 (H) <100 mg/dL      Comment:      Desirable:       <100 mg/dl    Non HDL Cholesterol 112 <130 mg/dL       Please call me if you have any questions about these test results or about your  care.    Sincerely,      Marlyn Arboleda MD/v

## 2017-11-26 NOTE — PROGRESS NOTES
Dear Negin Jonas,    Your test results are attached. I am happy to let you know that they are stable and your medications can stay the same.    The blood sugar is normal and you do not have diabetes. The kidneys are healthy. The cholesterol also looks good. We can recheck labs in 1 year.     Please call me if you have any questions about these test results or about your care.    Sincerely,    Marlyn Arboleda MD

## 2017-12-12 DIAGNOSIS — K21.9 GASTROESOPHAGEAL REFLUX DISEASE WITHOUT ESOPHAGITIS: ICD-10-CM

## 2017-12-12 NOTE — TELEPHONE ENCOUNTER
Requested Prescriptions   Pending Prescriptions Disp Refills     omeprazole (PRILOSEC) 20 MG CR capsule    Last Written Prescription Date:  11/25/16  Last Fill Quantity: 180,  # refills: 1   [place] [facility] [facility] or [facility] prescribing provider:  11/13/17   Future Office Visit:      180 capsule 1     Sig: Take 2 capsules (40 mg) by mouth daily    PPI Protocol Passed    12/12/2017  1:00 PM       Passed - Not on Clopidogrel (unless Pantoprazole ordered)       Passed - No diagnosis of osteoporosis on record       Passed - Recent or future visit with authorizing provider's specialty    Patient had office visit in the last year or has a visit in the next 30 days with authorizing provider.  See chart review.              Passed - Patient is age 18 or older       Passed - No active pregnacy on record       Passed - No positive pregnancy test in past 12 months              Nishant Faarax  [facility]

## 2017-12-15 NOTE — TELEPHONE ENCOUNTER
..Reason for Call:  prescription    Detailed comments: Fransisca from [facility] called to check the status, she said the patient would like it filled.    Phone Number Patient can be reached at: [phone]    Best Time: anytime    Can we leave a detailed message on this number? YES    Call taken on 12/15/2017 at 10:05 AM by Zakia Daniel

## 2017-12-26 ENCOUNTER — RADIANT APPOINTMENT (OUTPATIENT)
Dept: MAMMOGRAPHY | Facility: CLINIC | Age: 72
End: 2017-12-26
Attending: FAMILY MEDICINE
Payer: COMMERCIAL

## 2017-12-26 DIAGNOSIS — Z12.31 VISIT FOR SCREENING MAMMOGRAM: ICD-10-CM

## 2017-12-26 PROCEDURE — G0202 SCR MAMMO BI INCL CAD: HCPCS | Mod: TC

## 2018-01-19 DIAGNOSIS — L40.9 PSORIASIS: ICD-10-CM

## 2018-01-20 NOTE — TELEPHONE ENCOUNTER
"Requested Prescriptions   Pending Prescriptions Disp Refills     triamcinolone (KENALOG) 0.1 % cream  Last Written Prescription Date:  5/3/17  Last Fill Quantity: 80,  # refills: 0   Last Office Visit with [facility] or [facility] prescribing provider:  11/13/2017     Future Office Visit:      80 g 0    Topical Steroid Protocol Passed    1/19/2018  8:30 PM       Passed - Patient is age 6 or older       Passed - Authorizing prescriber's most recent note related to this medication read.       Passed - High potency steroid not ordered       Passed - Recent or future visit with authorizing provider's specialty    Patient had office visit in the last year or has a visit in the next 30 days with authorizing provider.  See \"Patient Info\" tab in inbasket, or \"Choose Columns\" in Meds & Orders section of the refill encounter.               "

## 2018-01-22 RX ORDER — TRIAMCINOLONE ACETONIDE 1 MG/G
CREAM TOPICAL
Qty: 80 G | Refills: 0 | Status: SHIPPED | OUTPATIENT
Start: 2018-01-22 | End: 2019-03-21

## 2018-01-22 NOTE — TELEPHONE ENCOUNTER
Routing refill request to provider for review/approval because:  No recent office visit noting the medication, can not fill per protocol.    Kelsey Stevens RN, [facility]

## 2018-06-17 DIAGNOSIS — K21.9 GASTROESOPHAGEAL REFLUX DISEASE WITHOUT ESOPHAGITIS: ICD-10-CM

## 2018-06-17 NOTE — TELEPHONE ENCOUNTER
"Requested Prescriptions   Pending Prescriptions Disp Refills     omeprazole (PRILOSEC) 20 MG CR capsule [Pharmacy Med Name: Omeprazole Oral Capsule Delayed Release 20 MG] 180 capsule 0    Last Written Prescription Date:  12/18/17  Last Fill Quantity: 180,  # refills: 1   Last Office Visit with [facility], [facility] or [facility] prescribing provider:  11/13/2017     Future Office Visit:      Sig: TAKE TWO CAPSULES BY MOUTH DAILY    PPI Protocol Passed    6/17/2018  7:03 AM       Passed - Not on Clopidogrel (unless Pantoprazole ordered)       Passed - No diagnosis of osteoporosis on record       Passed - Recent (12 mo) or future (30 days) visit within the authorizing provider's specialty    Patient had office visit in the last 12 months or has a visit in the next 30 days with authorizing provider or within the authorizing provider's specialty.  See \"Patient Info\" tab in inbasket, or \"Choose Columns\" in Meds & Orders section of the refill encounter.           Passed - Patient is age 18 or older       Passed - No active pregnacy on record       Passed - No positive pregnancy test in past 12 months          "

## 2018-06-19 NOTE — TELEPHONE ENCOUNTER
Prescription approved per [facility] [place].  Raquel Skelton RN

## 2018-09-07 ENCOUNTER — OFFICE VISIT (OUTPATIENT)
Dept: FAMILY MEDICINE | Facility: CLINIC | Age: 73
End: 2018-09-07
Payer: COMMERCIAL

## 2018-09-07 VITALS
HEIGHT: 66 IN | OXYGEN SATURATION: 95 % | TEMPERATURE: 98.3 F | HEART RATE: 75 BPM | RESPIRATION RATE: 20 BRPM | DIASTOLIC BLOOD PRESSURE: 80 MMHG | SYSTOLIC BLOOD PRESSURE: 146 MMHG | WEIGHT: 182 LBS | BODY MASS INDEX: 29.25 KG/M2

## 2018-09-07 DIAGNOSIS — R29.3 POSTURAL IMBALANCE: Primary | ICD-10-CM

## 2018-09-07 DIAGNOSIS — G40.909 SEIZURE DISORDER (H): ICD-10-CM

## 2018-09-07 DIAGNOSIS — F33.41 RECURRENT MAJOR DEPRESSIVE DISORDER, IN PARTIAL REMISSION (H): ICD-10-CM

## 2018-09-07 DIAGNOSIS — R29.6 FALLING EPISODES: ICD-10-CM

## 2018-09-07 DIAGNOSIS — R42 VERTIGO: ICD-10-CM

## 2018-09-07 DIAGNOSIS — S06.9X9S TRAUMATIC BRAIN INJURY WITH LOSS OF CONSCIOUSNESS, SEQUELA (H): ICD-10-CM

## 2018-09-07 DIAGNOSIS — L40.9 PSORIASIS: ICD-10-CM

## 2018-09-07 DIAGNOSIS — R53.82 CHRONIC FATIGUE: ICD-10-CM

## 2018-09-07 LAB
ALBUMIN SERPL-MCNC: 3.7 G/DL (ref 3.4–5)
ALP SERPL-CCNC: 97 U/L (ref 40–150)
ALT SERPL W P-5'-P-CCNC: 22 U/L (ref 0–50)
ANION GAP SERPL CALCULATED.3IONS-SCNC: 4 MMOL/L (ref 3–14)
AST SERPL W P-5'-P-CCNC: 17 U/L (ref 0–45)
BILIRUB SERPL-MCNC: 0.4 MG/DL (ref 0.2–1.3)
BUN SERPL-MCNC: 20 MG/DL (ref 7–30)
CALCIUM SERPL-MCNC: 9.4 MG/DL (ref 8.5–10.1)
CHLORIDE SERPL-SCNC: 105 MMOL/L (ref 94–109)
CO2 SERPL-SCNC: 31 MMOL/L (ref 20–32)
CREAT SERPL-MCNC: 0.71 MG/DL (ref 0.52–1.04)
ERYTHROCYTE [DISTWIDTH] IN BLOOD BY AUTOMATED COUNT: 13.7 % (ref 10–15)
GFR SERPL CREATININE-BSD FRML MDRD: 81 ML/MIN/1.7M2
GLUCOSE SERPL-MCNC: 97 MG/DL (ref 70–99)
HCT VFR BLD AUTO: 39.7 % (ref 35–47)
HGB BLD-MCNC: 13.1 G/DL (ref 11.7–15.7)
MCH RBC QN AUTO: 29.4 PG (ref 26.5–33)
MCHC RBC AUTO-ENTMCNC: 33 G/DL (ref 31.5–36.5)
MCV RBC AUTO: 89 FL (ref 78–100)
PLATELET # BLD AUTO: 296 10E9/L (ref 150–450)
POTASSIUM SERPL-SCNC: 4.8 MMOL/L (ref 3.4–5.3)
PROT SERPL-MCNC: 7.3 G/DL (ref 6.8–8.8)
RBC # BLD AUTO: 4.46 10E12/L (ref 3.8–5.2)
SODIUM SERPL-SCNC: 140 MMOL/L (ref 133–144)
TSH SERPL DL<=0.005 MIU/L-ACNC: 2.9 MU/L (ref 0.4–4)
WBC # BLD AUTO: 6.4 10E9/L (ref 4–11)

## 2018-09-07 PROCEDURE — 80175 DRUG SCREEN QUAN LAMOTRIGINE: CPT | Mod: 90 | Performed by: FAMILY MEDICINE

## 2018-09-07 PROCEDURE — 85027 COMPLETE CBC AUTOMATED: CPT | Performed by: FAMILY MEDICINE

## 2018-09-07 PROCEDURE — 36415 COLL VENOUS BLD VENIPUNCTURE: CPT | Performed by: FAMILY MEDICINE

## 2018-09-07 PROCEDURE — 84443 ASSAY THYROID STIM HORMONE: CPT | Performed by: FAMILY MEDICINE

## 2018-09-07 PROCEDURE — 80053 COMPREHEN METABOLIC PANEL: CPT | Performed by: FAMILY MEDICINE

## 2018-09-07 PROCEDURE — 99000 SPECIMEN HANDLING OFFICE-LAB: CPT | Performed by: FAMILY MEDICINE

## 2018-09-07 PROCEDURE — 99214 OFFICE O/P EST MOD 30 MIN: CPT | Performed by: FAMILY MEDICINE

## 2018-09-07 ASSESSMENT — PAIN SCALES - GENERAL: PAINLEVEL: NO PAIN (0)

## 2018-09-07 NOTE — PATIENT INSTRUCTIONS
Fall with Uncertain Cause  You have had a fall today. But the cause of your fall is not certain. Falls can happen due to slipping, tripping or losing your balance. A fall can also happen from a fainting spell or seizure.  While a fall can happen for a simple reason (tripping over something), falls in elderly people are often caused by a combination of things:    Age-related decline in function with worsening balance, stability, vision, and muscle strength    Chronic illness, such as heart arrhythmias, heart valve disease, vascular disease, COPD, diabetes, strokes, or arthritis    Shoes that do not give much support and make you prone to slip or slide    Anemia or low blood pressure     Effects or side effects of medicines    Dehydration or recent use of alcohol    Environmental hazards, such as uneven or slippery ground, unfamiliar place, obstacles, uneven surfaces, or slippery ground    Situational factors (related to the activity being done, such as rushing to the bathroom)  Because the cause of your fall today is not certain, it is possible that a fainting spell or seizure was the cause. This means that it could happen again, without warning. If you fall again, without a cause, then you should return to this facility promptly to have further tests. Otherwise, follow up with your healthcare provider as explained below.  It is normal to feel sore and tight in your muscles and back the next day, and not just the muscles you initially injured. Remember, all the parts of your body are connected, so while initially one area hurts, the next day another may hurt. Also, when you injure yourself, it causes inflammation, which then causes the muscles to tighten up and hurt more. After the initial worsening, it should gradually improve over the next few days. However, more severe pain should be reported.  Even without a definite head injury, you can still get a concussion. Concussions and even bleeding can still happen,  especially if you have had a recent injury or take blood thinner medicine. It is not unusual to have a mild headache and feel tired and even nauseous or dizzy.  Home care    Rest today and resume your normal activities as soon as you are feeling back to normal. It is best to remain with someone who can check on you for the next 24 hours to watch for another episode of falling.    If you were injured during the fall, follow the advice from your healthcare provider regarding care of your injury.    If you become lightheaded or dizzy, lie down right away or sit and lean forward with your head down.    As a precaution, don't drive a car or operate dangerous equipment, don't take a bath alone (use a shower instead), and don't swim alone until you see your healthcare provider. A condition causing fainting or seizures must be ruled out before resuming these activities.    You may use acetaminophen or ibuprofen to control pain, unless another pain medicine was prescribed. If you have chronic liver or kidney disease or ever had a stomach ulcer or gastrointestinal bleeding, talk with your healthcare provider before using these medicines.    Keep your appointments for any further testing that may have been scheduled for you.  Follow-up care  Follow up with your healthcare provider, or as advised.  If X-rays or CT scan were done, you will be notified if there is a change in the reading, especially if it affects treatment.  Call 911  Call 911 if any of these happen:    Trouble breathing    Confused or difficulty arousing    Fainting or loss of consciousness    Rapid or very slow heart rate    Seizure    Difficulty with speech or vision, weakness of an arm or leg    Difficulty walking or talking, loss of balance, numbness or weakness in one side of your body, facial droop  When to seek medical advice  Call your healthcare provider right away if any of these happen:    Another unexplained fall    Dizziness    Severe headache     Nausea and vomiting    Blood in vomit, stools (black or red color)  Date Last Reviewed: 11/1/2017 2000-2017 [employer]. [address] [place] [address]. All rights reserved. This information is not intended as a substitute for professional medical care. Always follow your healthcare professional's instructions.        Anatomy of the Inner Ear    There are two parts of the inner ear. One part (hearing canal) is for hearing. The other part (balance canal) is for balance.  The canals are filled with a fluid called endolymph. The level of this fluid is maintained by a small organ called the endolymphatic sac. In the hearing canal, sound waves cause vibrations in the endolymph. The inner ear detects these sound waves and sends nerve impulses to the brain. The sound we hear is a result of the brain's interpretation of these nerve impulses.  In the balance canals, change in position causes movement of the fluid. This movement is detected in the balance portion of the inner ear, and nerve impulses are sent to the brain.  The endolymphatic sac keeps inner ear fluid at a constant level. The balance canals collect balance information. The hearing canal collects sound information. The hearing and balance nerves carry information to the brain from both parts of the inner ear.  Date Last Reviewed: 10/1/2016    2487-3396 [employer]. [address] [place] [id number]. All rights reserved. This information is not intended as a substitute for professional medical care. Always follow your healthcare professional's instructions.        Vertigo (Unknown Cause)    In addition to helping with hearing, the inner ear is part of the balance center of your body. Problems with the inner ear can a false feeling of motion. This is called vertigo. Often, it feels as if you or the room is spinning. A vertigo attack may cause sudden nausea, vomiting and heavy sweating. Severe vertigo causes a loss  of balance and can cause you to fall. During vertigo, small head movements and changes in body position will often make the symptoms worse. You may also have ringing in the ears called tinnitus.  An episode of vertigo may last seconds, minutes or hours. Once you are over the first episode, it may never come back. However, symptoms may return off and on.  The cause of your vertigo is not yet known. Possible causes of vertigo include:    Inflammation of the inner ear    Disease of the nerves to the inner ear    Movement of calcium particles in the inner ear    Poor blood flow to the balance centers of the brain    Migraine headaches    In older adults, the use of more than one medicine along with some health conditions  Home care    If symptoms are severe, rest quietly in bed. Change positions very slowly. There is usually one position that will feel best, such as lying on one side or lying on your back with your head slightly raised on pillows. Until you have no symptoms, you are at a higher risk of falling. Let someone help you when you get up. Get rid of home hazards such as loose electrical cords and throw rugs. Don t walk in unfamiliar areas that are not lighted. Use night lights in bathrooms and kitchen areas.    Do not drive a car or work with dangerous machinery until symptoms have been gone for at least one week.    Take medicine as prescribed to relieve your symptoms. Unless another medicine was prescribed for symptoms of nausea, vomiting, and dizziness, you may use over-the-counter motion sickness pills. Ask your pharmacist for suggestions.  Follow-up care  Follow up with your healthcare provider or as directed. If you are referred to a specialist or for testing, make the appointment promptly.  When to seek medical advice  Call your healthcare provider if any of the following occur:    Fever of 100.4 F (38 C) or higher, or as directed by your healthcare provider    Vertigo worsens or is not controlled  by prescribed medicine     Repeated vomiting not relieved by prescribed medicine     Severe headache    Confusion    Weakness of an arm or leg or 1 side of the face    Difficulty with speech or vision    Loss of consciousness     Seizure  Date Last Reviewed: 11/1/2017 2000-2017 [employer]. [address] [place] [address], [place] [address]. All rights reserved. This information is not intended as a substitute for professional medical care. Always follow your healthcare professional's instructions.

## 2018-09-07 NOTE — MR AVS SNAPSHOT
After Visit Summary   9/7/2018    Negin Jonas    MRN: [id number]           Patient Information     Date Of Birth          1945        Visit Information        Provider Department      9/7/2018 1:40 PM Marlyn Arboleda MD [facility]        Today's Diagnoses     Postural imbalance    -  1    Falling episodes        Chronic fatigue        Recurrent major depressive disorder, in partial remission (H)        Traumatic brain injury with loss of consciousness, sequela (H)        Psoriasis        Vertigo        Seizure disorder (H)          Care Instructions      Fall with Uncertain Cause  You have had a fall today. But the cause of your fall is not certain. Falls can happen due to slipping, tripping or losing your balance. A fall can also happen from a fainting spell or seizure.  While a fall can happen for a simple reason (tripping over something), falls in elderly people are often caused by a combination of things:    Age-related decline in function with worsening balance, stability, vision, and muscle strength    Chronic illness, such as heart arrhythmias, heart valve disease, vascular disease, COPD, diabetes, strokes, or arthritis    Shoes that do not give much support and make you prone to slip or slide    Anemia or low blood pressure     Effects or side effects of medicines    Dehydration or recent use of alcohol    Environmental hazards, such as uneven or slippery ground, unfamiliar place, obstacles, uneven surfaces, or slippery ground    Situational factors (related to the activity being done, such as rushing to the bathroom)  Because the cause of your fall today is not certain, it is possible that a fainting spell or seizure was the cause. This means that it could happen again, without warning. If you fall again, without a cause, then you should return to this facility promptly to have further tests. Otherwise, follow up with your healthcare provider as explained  below.  It is normal to feel sore and tight in your muscles and back the next day, and not just the muscles you initially injured. Remember, all the parts of your body are connected, so while initially one area hurts, the next day another may hurt. Also, when you injure yourself, it causes inflammation, which then causes the muscles to tighten up and hurt more. After the initial worsening, it should gradually improve over the next few days. However, more severe pain should be reported.  Even without a definite head injury, you can still get a concussion. Concussions and even bleeding can still happen, especially if you have had a recent injury or take blood thinner medicine. It is not unusual to have a mild headache and feel tired and even nauseous or dizzy.  Home care    Rest today and resume your normal activities as soon as you are feeling back to normal. It is best to remain with someone who can check on you for the next 24 hours to watch for another episode of falling.    If you were injured during the fall, follow the advice from your healthcare provider regarding care of your injury.    If you become lightheaded or dizzy, lie down right away or sit and lean forward with your head down.    As a precaution, don't drive a car or operate dangerous equipment, don't take a bath alone (use a shower instead), and don't swim alone until you see your healthcare provider. A condition causing fainting or seizures must be ruled out before resuming these activities.    You may use acetaminophen or ibuprofen to control pain, unless another pain medicine was prescribed. If you have chronic liver or kidney disease or ever had a stomach ulcer or gastrointestinal bleeding, talk with your healthcare provider before using these medicines.    Keep your appointments for any further testing that may have been scheduled for you.  Follow-up care  Follow up with your healthcare provider, or as advised.  If X-rays or CT scan were done,  you will be notified if there is a change in the reading, especially if it affects treatment.  Call 911  Call 911 if any of these happen:    Trouble breathing    Confused or difficulty arousing    Fainting or loss of consciousness    Rapid or very slow heart rate    Seizure    Difficulty with speech or vision, weakness of an arm or leg    Difficulty walking or talking, loss of balance, numbness or weakness in one side of your body, facial droop  When to seek medical advice  Call your healthcare provider right away if any of these happen:    Another unexplained fall    Dizziness    Severe headache    Nausea and vomiting    Blood in vomit, stools (black or red color)  Date Last Reviewed: 11/1/2017 2000-2017 [employer]. [address] [place] [address], [place] [address]. All rights reserved. This information is not intended as a substitute for professional medical care. Always follow your healthcare professional's instructions.        Anatomy of the Inner Ear    There are two parts of the inner ear. One part (hearing canal) is for hearing. The other part (balance canal) is for balance.  The canals are filled with a fluid called endolymph. The level of this fluid is maintained by a small organ called the endolymphatic sac. In the hearing canal, sound waves cause vibrations in the endolymph. The inner ear detects these sound waves and sends nerve impulses to the brain. The sound we hear is a result of the brain's interpretation of these nerve impulses.  In the balance canals, change in position causes movement of the fluid. This movement is detected in the balance portion of the inner ear, and nerve impulses are sent to the brain.  The endolymphatic sac keeps inner ear fluid at a constant level. The balance canals collect balance information. The hearing canal collects sound information. The hearing and balance nerves carry information to the brain from both parts of the inner ear.  Date Last Reviewed:  10/1/2016    [id number] [employer]. [address] [place] [address], [place] [address]. All rights reserved. This information is not intended as a substitute for professional medical care. Always follow your healthcare professional's instructions.        Vertigo (Unknown Cause)    In addition to helping with hearing, the inner ear is part of the balance center of your body. Problems with the inner ear can a false feeling of motion. This is called vertigo. Often, it feels as if you or the room is spinning. A vertigo attack may cause sudden nausea, vomiting and heavy sweating. Severe vertigo causes a loss of balance and can cause you to fall. During vertigo, small head movements and changes in body position will often make the symptoms worse. You may also have ringing in the ears called tinnitus.  An episode of vertigo may last seconds, minutes or hours. Once you are over the first episode, it may never come back. However, symptoms may return off and on.  The cause of your vertigo is not yet known. Possible causes of vertigo include:    Inflammation of the inner ear    Disease of the nerves to the inner ear    Movement of calcium particles in the inner ear    Poor blood flow to the balance centers of the brain    Migraine headaches    In older adults, the use of more than one medicine along with some health conditions  Home care    If symptoms are severe, rest quietly in bed. Change positions very slowly. There is usually one position that will feel best, such as lying on one side or lying on your back with your head slightly raised on pillows. Until you have no symptoms, you are at a higher risk of falling. Let someone help you when you get up. Get rid of home hazards such as loose electrical cords and throw rugs. Don t walk in unfamiliar areas that are not lighted. Use night lights in bathrooms and kitchen areas.    Do not drive a car or work with dangerous machinery until symptoms have been gone for at least  one week.    Take medicine as prescribed to relieve your symptoms. Unless another medicine was prescribed for symptoms of nausea, vomiting, and dizziness, you may use over-the-counter motion sickness pills. Ask your pharmacist for suggestions.  Follow-up care  Follow up with your healthcare provider or as directed. If you are referred to a specialist or for testing, make the appointment promptly.  When to seek medical advice  Call your healthcare provider if any of the following occur:    Fever of 100.4 F (38 C) or higher, or as directed by your healthcare provider    Vertigo worsens or is not controlled by prescribed medicine     Repeated vomiting not relieved by prescribed medicine     Severe headache    Confusion    Weakness of an arm or leg or 1 side of the face    Difficulty with speech or vision    Loss of consciousness     Seizure  Date Last Reviewed: 11/1/2017 2000-2017 [employer]. [address] [place] [address]. All rights reserved. This information is not intended as a substitute for professional medical care. Always follow your healthcare professional's instructions.                Follow-ups after your visit        Follow-up notes from your care team     Return in about 3 months (around 12/7/2018) for recheck.      Who to contact     If you have questions or need follow up information about today's clinic visit or your schedule please contact [facility] directly at [phone].  Normal or non-critical lab and imaging results will be communicated to you by MyChart, letter or phone within 4 business days after the clinic has received the results. If you do not hear from us within 7 days, please contact the clinic through MyChart or phone. If you have a critical or abnormal lab result, we will notify you by phone as soon as possible.  Submit refill requests through [employer] or call your pharmacy and they will forward the refill request to us. Please allow 3  "business days for your refill to be completed.          Additional Information About Your Visit        MyChart Information     [employer] lets you send messages to your doctor, view your test results, renew your prescriptions, schedule appointments and more. To sign up, go to [email or web address] . Click on \"Log in\" on the left side of the screen, which will take you to the Welcome page. Then click on \"Sign up Now\" on the right side of the page.     You will be asked to enter the access code listed below, as well as some personal information. Please follow the directions to create your username and password.     Your access code is: [email or web address]  Expires: 2018  2:29 PM     Your access code will  in 90 days. If you need help or a new code, please call your [facility] clinic or [phone].        Care EveryWhere ID     This is your Care EveryWhere ID. This could be used by other organizations to access your [facility] medical records  [id number]        Your Vitals Were     Pulse Temperature Respirations Height Pulse Oximetry BMI (Body Mass Index)    75 98.3  F (36.8  C) (Oral) 20 5' 6\" (1.676 m) 95% 29.38 kg/m2       Blood Pressure from Last 3 Encounters:   18 (!) 146/100   17 116/67   02/15/17 156/78    Weight from Last 3 Encounters:   18 182 lb (82.6 kg)   17 162 lb (73.5 kg)   02/15/17 191 lb (86.6 kg)              We Performed the Following     CBC with platelets     Comprehensive metabolic panel     Lamotrigine Level     TSH with free T4 reflex        Primary Care Provider Office Phone # Fax #    Marlyn Arboleda [phone]       [address]  [facility] [address]        Equal Access to Services     JUDSON KARIMI : Salomon Almodovar, wakalie aldana, qaybta kaaljosé luis wood, karin armas. [facility] [phone].    ATENCIÓN: Si habla español, tiene a jasmine disposición servicios gratuitos de asistencia lingüística. " Jyotsna farr [phone].    We comply with applicable federal civil rights laws and [place] laws. We do not discriminate on the basis of race, color, national origin, age, disability, sex, sexual orientation, or gender identity.            Thank you!     Thank you for choosing [facility]  for your care. Our goal is always to provide you with excellent care. Hearing back from our patients is one way we can continue to improve our services. Please take a few minutes to complete the written survey that you may receive in the mail after your visit with us. Thank you!             Your Updated Medication List - Protect others around you: Learn how to safely use, store and throw away your medicines at www.disposemymeds.org.          This list is accurate as of 9/7/18  2:29 PM.  Always use your most recent med list.                   Brand Name Dispense Instructions for use Diagnosis    aspirin 81 MG EC tablet    ASPIRIN ADULT LOW STRENGTH    90 tablet    Take 1 tablet (81 mg) by mouth daily    Generalized anxiety disorder       citalopram 20 MG tablet    celeXA    90 tablet    Take 1 tablet (20 mg) by mouth daily    Generalized anxiety disorder, Recurrent major depressive disorder, in partial remission (H)       fluticasone 50 MCG/ACT spray    FLONASE    16 g    Spray 1-2 sprays into both nostrils daily For nasal allergies    Chronic rhinitis       lamoTRIgine 200 MG tablet    LaMICtal    60 tablet    Take 1 tablet (200 mg) by mouth 2 times daily Reported on 2/15/2017    Seizure disorder (H)       omeprazole 20 MG CR capsule    priLOSEC    180 capsule    TAKE TWO CAPSULES BY MOUTH DAILY    Gastroesophageal reflux disease without esophagitis       triamcinolone 0.1 % cream    KENALOG    80 g    apply topically twice a day as needed for a maximum of 14 days    Psoriasis

## 2018-09-07 NOTE — LETTER
September 10, 2018        Negin Jonas  [address]  [place] [address]        Dear Negin,    Your test results are attached. I am happy to let you know that they are stable and your medications can stay the same.    The Lamictal level is normal. The blood sugar is normal and you do not have diabetes. The kidney and liver tests were normal. The thyroid test is normal. There is no explanation for your dizziness or increased fatigue. Please follow up with ENT and neurology.    Please call me if you have any questions about these test results or about your care.    Sincerely,      Marlyn Arboleda MD/bernice    Resulted Orders   CBC with platelets   Result Value Ref Range    WBC 6.4 4.0 - 11.0 10e9/L    RBC Count 4.46 3.8 - 5.2 10e12/L    Hemoglobin 13.1 11.7 - 15.7 g/dL    Hematocrit 39.7 35.0 - 47.0 %    MCV 89 78 - 100 fl    MCH 29.4 26.5 - 33.0 pg    MCHC 33.0 31.5 - 36.5 g/dL    RDW 13.7 10.0 - 15.0 %    Platelet Count 296 150 - 450 10e9/L   Comprehensive metabolic panel   Result Value Ref Range    Sodium 140 133 - 144 mmol/L    Potassium 4.8 3.4 - 5.3 mmol/L    Chloride 105 94 - 109 mmol/L    Carbon Dioxide 31 20 - 32 mmol/L    Anion Gap 4 3 - 14 mmol/L    Glucose 97 70 - 99 mg/dL      Comment:      Non Fasting    Urea Nitrogen 20 7 - 30 mg/dL    Creatinine 0.71 0.52 - 1.04 mg/dL    GFR Estimate 81 >60 mL/min/1.7m2      Comment:      Non  GFR Calc    GFR Estimate If Black >90 >60 mL/min/1.7m2      Comment:       GFR Calc    Calcium 9.4 8.5 - 10.1 mg/dL    Bilirubin Total 0.4 0.2 - 1.3 mg/dL    Albumin 3.7 3.4 - 5.0 g/dL    Protein Total 7.3 6.8 - 8.8 g/dL    Alkaline Phosphatase 97 40 - 150 U/L    ALT 22 0 - 50 U/L    AST 17 0 - 45 U/L   TSH with free T4 reflex   Result Value Ref Range    TSH 2.90 0.40 - 4.00 mU/L   Lamotrigine Level   Result Value Ref Range    Lamotrigine Level 6.4 2.5 - 15.0 ug/mL      Comment:      (Note)  INTERPRETIVE INFORMATION:   Lamotrigine  Therapeutic Range:  2.5-15.0 ug/mL             Toxic:  Not well established  Pharmacokinetics varies widely, particularly with   co-medications and/or compromised renal function.  Adverse   effects may include dizziness, somnolence, nausea and   vomiting.  Performed by [facility],  [address] [place] [id number] [phone]  [email or web address], Tu Barrow MD, Lab. Director

## 2018-09-07 NOTE — PROGRESS NOTES
SUBJECTIVE:   Negin Jonas is a 73 year old female who presents to clinic today for the following health issues:    Dizziness  Onset: 1 month     Description:   Do you feel faint:  YES  Does it feel like the surroundings (bed, room) are moving: YES  Unsteady/off balance: YES  Have you passed out or fallen: YES, 1 week ago fell but can't be specific about details. She did have bruising, but this is better now.     Intensity: severe    Progression of Symptoms:  same    Accompanying Signs & Symptoms:  Heart palpitations: YES- in fingers   Nausea, vomiting: SOMETIMES  Weakness in arms or legs: YES  Fatigue: YES  Vision or speech changes: no   Ringing in ears (Tinnitus): YES  Hearing Loss: no     History:   Head trauma/concussion hx: YES in the past   Previous similar symptoms: no   Recent bleeding history: no     Precipitating factors:   Worse with activity or head movement: no   Any new medications (BP?): no   Alcohol/drug abuse/withdrawal: no     Alleviating factors:   Does staying in a fixed position give relief:  no     Therapies Tried and outcome: None    Depression and Anxiety Follow-Up    Status since last visit: No change    Other associated symptoms:None    Complicating factors:     Significant life event: No     Current substance abuse: None    PHQ-9 7/27/2016 11/13/2017 9/7/2018   Total Score 5 1 9   Q9: Suicide Ideation Not at all Not at all Not at all     EWA-7 SCORE 7/23/2013 8/31/2015 7/27/2016   Total Score 6 - -   Total Score - 6 12       PHQ-9  English  PHQ-9   Any Language  EWA-7  Suicide Assessment Five-step Evaluation and Treatment (SAFE-T)    Problem list and histories reviewed & adjusted, as indicated.  Additional history: as documented    Patient Active Problem List   Diagnosis     CARDIOVASCULAR SCREENING; LDL GOAL LESS THAN 160     Chronic rhinitis     Psoriasis     Short-term memory loss     Generalized anxiety disorder     Panic attacks     Advanced directives, counseling/discussion      Health Care Home     Bursitis of right knee     Traumatic brain injury (H)     Vertigo     Seizure disorder (H)     Recurrent major depressive disorder, in partial remission (H)     Adhesive capsulitis of both shoulders     Past Surgical History:   Procedure Laterality Date     ESOPHAGOSCOPY, GASTROSCOPY, DUODENOSCOPY (EGD), COMBINED N/A 12/17/2015    Procedure: COMBINED ESOPHAGOSCOPY, GASTROSCOPY, DUODENOSCOPY (EGD);  Surgeon: Duane, William Charles, MD;  Location: [facility] OR     ESOPHAGOSCOPY, GASTROSCOPY, DUODENOSCOPY (EGD), COMBINED N/A 12/17/2015    Procedure: COMBINED ESOPHAGOSCOPY, GASTROSCOPY, DUODENOSCOPY (EGD), BIOPSY SINGLE OR MULTIPLE;  Surgeon: Duane, William Charles, MD;  Location: [facility] OR     HC REMOVAL GALLBLADDER       HYSTERECTOMY, PAP NO LONGER INDICATED  1992    with ovaries     RELEASE CARPAL TUNNEL BILATERAL         Social History   Substance Use Topics     Smoking status: Former Smoker     Smokeless tobacco: Never Used      Comment: Quit 15 years ago.     Alcohol use No     Family History   Problem Relation Age of Onset     C.A.D. Father 70     CABG x 3      Respiratory Father      emphysema     Psychotic Disorder Father      depression     Diabetes Mother      Psychotic Disorder Mother      depression     Diabetes Maternal Grandfather      Hypertension Sister      Anxiety Disorder Sister      Respiratory Sister      emphysema         Current Outpatient Prescriptions   Medication Sig Dispense Refill     aspirin (ASPIRIN ADULT LOW STRENGTH) 81 MG EC tablet Take 1 tablet (81 mg) by mouth daily 90 tablet 1     citalopram (CELEXA) 20 MG tablet Take 1 tablet (20 mg) by mouth daily 90 tablet 3     fluticasone (FLONASE) 50 MCG/ACT nasal spray Spray 1-2 sprays into both nostrils daily For nasal allergies 16 g 3     lamoTRIgine (LAMICTAL) 200 MG tablet Take 1 tablet (200 mg) by mouth 2 times daily Reported on 2/15/2017 60 tablet 11     omeprazole (PRILOSEC) 20 MG CR capsule TAKE TWO CAPSULES BY MOUTH  "DAILY  180 capsule 0     triamcinolone (KENALOG) 0.1 % cream apply topically twice a day as needed for a maximum of 14 days 80 g 0     Allergies   Allergen Reactions     Codeine Sulfate      Recent Labs   Lab Test  09/07/18   1440  11/24/17   0800  07/28/16   0813  10/06/14   1007   LDL   --   100*  141*  140*   HDL   --   98  79  82   TRIG   --   62  89  66   ALT  22  22  25  24   CR  0.71  0.70  0.76  0.70   GFRESTIMATED  81  83  76  84   GFRESTBLACK  >90  >90  >90  African American GFR Calc    >90   GFR Calc     POTASSIUM  4.8  4.1  4.2  4.4   TSH  2.90   --    --    --       BP Readings from Last 3 Encounters:   09/07/18 146/80   11/13/17 116/67   02/15/17 156/78    Wt Readings from Last 3 Encounters:   09/07/18 182 lb (82.6 kg)   11/13/17 162 lb (73.5 kg)   02/15/17 191 lb (86.6 kg)                  Labs reviewed in EPIC    Reviewed and updated as needed this visit by clinical staff  Tobacco  Allergies  Meds  Med Hx  Surg Hx  Fam Hx  Soc Hx      Reviewed and updated as needed this visit by Provider         ROS:  Constitutional, HEENT, cardiovascular, pulmonary, gi and gu systems are negative, except as otherwise noted.    OBJECTIVE:     /80 (BP Location: Left arm, Patient Position: Sitting, Cuff Size: Adult Large)  Pulse 75  Temp 98.3  F (36.8  C) (Oral)  Resp 20  Ht 5' 6\" (1.676 m)  Wt 182 lb (82.6 kg)  SpO2 95%  BMI 29.38 kg/m2  Body mass index is 29.38 kg/(m^2).  GENERAL: healthy, alert, well nourished, well hydrated, no distress, obese  HENT: ear canals- normal; TMs- normal; Nose- normal; Mouth- no ulcers, no lesions, throat is clear with no erythema or exudate.   NECK: no tenderness, no adenopathy, no asymmetry, no masses, no stiffness; thyroid- normal to palpation  RESP: lungs clear to auscultation - no rales, no rhonchi, no wheezes  CV: regular rates and rhythm, normal S1 S2, no S3 or S4 and no murmur, no click or rub -  ABDOMEN: soft, no tenderness, no  " hepatosplenomegaly, no masses, normal bowel sounds  MS: extremities- no gross deformities noted, no edema  SKIN: no suspicious lesions, no rashes  NEURO: strength and tone- normal, sensory exam- grossly normal, mentation- intact, speech- normal, reflexes- symmetric, positive Rhomberg. No nystagmus.   BACK: no CVA tenderness, no paralumbar tenderness  PSYCH: Alert and oriented times 3; speech- coherent , normal rate and volume; able to articulate logical thoughts, mostly concrete thinking, no hallucinations or delusions, affect- normal for patient, but not a good historian due to vagueness.    Diagnostic Test Results:  Results for orders placed or performed in visit on 09/07/18 (from the past 24 hour(s))   CBC with platelets   Result Value Ref Range    WBC 6.4 4.0 - 11.0 10e9/L    RBC Count 4.46 3.8 - 5.2 10e12/L    Hemoglobin 13.1 11.7 - 15.7 g/dL    Hematocrit 39.7 35.0 - 47.0 %    MCV 89 78 - 100 fl    MCH 29.4 26.5 - 33.0 pg    MCHC 33.0 31.5 - 36.5 g/dL    RDW 13.7 10.0 - 15.0 %    Platelet Count 296 150 - 450 10e9/L   Comprehensive metabolic panel   Result Value Ref Range    Sodium 140 133 - 144 mmol/L    Potassium 4.8 3.4 - 5.3 mmol/L    Chloride 105 94 - 109 mmol/L    Carbon Dioxide 31 20 - 32 mmol/L    Anion Gap 4 3 - 14 mmol/L    Glucose 97 70 - 99 mg/dL    Urea Nitrogen 20 7 - 30 mg/dL    Creatinine 0.71 0.52 - 1.04 mg/dL    GFR Estimate 81 >60 mL/min/1.7m2    GFR Estimate If Black >90 >60 mL/min/1.7m2    Calcium 9.4 8.5 - 10.1 mg/dL    Bilirubin Total 0.4 0.2 - 1.3 mg/dL    Albumin 3.7 3.4 - 5.0 g/dL    Protein Total 7.3 6.8 - 8.8 g/dL    Alkaline Phosphatase 97 40 - 150 U/L    ALT 22 0 - 50 U/L    AST 17 0 - 45 U/L   TSH with free T4 reflex   Result Value Ref Range    TSH 2.90 0.40 - 4.00 mU/L       ASSESSMENT/PLAN:         Tobacco Cessation:   reports that she has quit smoking. She has never used smokeless tobacco.      BMI:   Estimated body mass index is 29.38 kg/(m^2) as calculated from the  "following:    Height as of this encounter: 5' 6\" (1.676 m).    Weight as of this encounter: 182 lb (82.6 kg).   Weight management plan: Discussed healthy diet and exercise guidelines and patient will follow up in 6 months in clinic to re-evaluate.        ICD-10-CM    1. Postural imbalance R29.3 Feels more imbalance than vertigo that started all of a sudden 1 month ago. Feeling more fatigued but no other clear symptoms    2. Falling episodes R29.6    3. Chronic fatigue R53.82 CBC with platelets     Comprehensive metabolic panel     TSH with free T4 reflex   4. Recurrent major depressive disorder, in partial remission (H) F33.41 Somewhat worse, but mostly physical symptoms from fatigue.   5. Traumatic brain injury with loss of consciousness, sequela (H) S06.9X9S Persistent symptoms with history of dizziness   6. Psoriasis L40.9 Stable per patient   7. Vertigo R42 recurrent   8. Seizure disorder (H) G40.909 Lamotrigine Level to check on overdosing       CONSULTATION/REFERRAL to ENT or neurology for follow up   FUTURE LABS:       - Schedule fasting labs in 6 months  FUTURE APPOINTMENTS:       - Follow-up visit in 2-3 weeks or sooner if any questions or concerns.   Work on weight loss  Regular exercise  See Patient Instructions    Marlyn Arboleda MD  [facility]  "

## 2018-09-08 LAB — LAMOTRIGINE SERPL-MCNC: 6.4 UG/ML (ref 2.5–15)

## 2018-09-08 ASSESSMENT — PATIENT HEALTH QUESTIONNAIRE - PHQ9: SUM OF ALL RESPONSES TO PHQ QUESTIONS 1-9: 9

## 2018-09-08 NOTE — PROGRESS NOTES
Dear Negin Jonas,    Your test results are attached. I am happy to let you know that they are stable and your medications can stay the same.    The Lamictal level is normal. The blood sugar is normal and you do not have diabetes. The kidney and liver tests were normal. The thyroid test is normal. There is no explanation for your dizziness or increased fatigue. Please follow up with ENT and neurology.    Please call me if you have any questions about these test results or about your care.    Sincerely,    Marlyn Arboleda MD

## 2018-09-16 DIAGNOSIS — K21.9 GASTROESOPHAGEAL REFLUX DISEASE WITHOUT ESOPHAGITIS: ICD-10-CM

## 2018-09-16 NOTE — TELEPHONE ENCOUNTER
"Requested Prescriptions   Pending Prescriptions Disp Refills     omeprazole (PRILOSEC) 20 MG CR capsule [Pharmacy Med Name: Omeprazole Oral Capsule Delayed Release 20 MG] 180 capsule 0     Sig: TAKE TWO CAPSULES BY MOUTH DAILY    Last Written Prescription Date:  06/19/18  Last Fill Quantity: 180,  # refills: 0   Last office visit: 9/7/2018 with prescribing provider:     Future Office Visit:        PPI Protocol Passed    9/16/2018  7:08 AM       Passed - Not on Clopidogrel (unless Pantoprazole ordered)       Passed - No diagnosis of osteoporosis on record       Passed - Recent (12 mo) or future (30 days) visit within the authorizing provider's specialty    Patient had office visit in the last 12 months or has a visit in the next 30 days with authorizing provider or within the authorizing provider's specialty.  See \"Patient Info\" tab in inbasket, or \"Choose Columns\" in Meds & Orders section of the refill encounter.           Passed - Patient is age 18 or older       Passed - No active pregnacy on record       Passed - No positive pregnancy test in past 12 months          "

## 2018-09-18 NOTE — TELEPHONE ENCOUNTER
Pres[place] [facility] [place].  Ashly Calderón RN

## 2018-11-13 DIAGNOSIS — F33.41 RECURRENT MAJOR DEPRESSIVE DISORDER, IN PARTIAL REMISSION (H): ICD-10-CM

## 2018-11-13 DIAGNOSIS — F41.1 GENERALIZED ANXIETY DISORDER: ICD-10-CM

## 2018-11-13 NOTE — TELEPHONE ENCOUNTER
"Requested Prescriptions   Pending Prescriptions Disp Refills     citalopram (CELEXA) 20 MG tablet [Pharmacy Med Name: Citalopram Hydrobromide Oral Tablet 20 MG]  Last Written Prescription Date:  11/13/17  Last Fill Quantity:90 ,  # refills: 3   Last Office Visit with [facility], [facility] or [facility] prescribing provider:  09/07/18-Nkechi  Future Office Visit:    90 tablet 2     Sig: TAKE ONE TABLET BY MOUTH ONE TIME DAILY    [facility] Protocol Failed    11/13/2018  7:09 AM       Failed - PHQ-9 score less than 5 in past 6 months    Please review last PHQ-9 score.          Passed - Patient is age 18 or older       Passed - No active pregnancy on record       Passed - No positive pregnancy test in last 12 months       Passed - Recent (6 mo) or future (30 days) visit within the authorizing provider's specialty    Patient had office visit in the last 6 months or has a visit in the next 30 days with authorizing provider or within the authorizing provider's specialty.  See \"Patient Info\" tab in inbasket, or \"Choose Columns\" in Meds & Orders section of the refill encounter.              "

## 2018-11-16 RX ORDER — CITALOPRAM HYDROBROMIDE 20 MG/1
TABLET ORAL
Qty: 90 TABLET | Refills: 0 | Status: SHIPPED | OUTPATIENT
Start: 2018-11-16 | End: 2019-03-21

## 2018-11-16 NOTE — TELEPHONE ENCOUNTER
Routing refill request to provider for review/approval because:  Labs out of range:  PHQ-9 is greater than 5 in the last 6 months.     Jillian Garcia RN

## 2018-12-22 DIAGNOSIS — K21.9 GASTROESOPHAGEAL REFLUX DISEASE WITHOUT ESOPHAGITIS: ICD-10-CM

## 2018-12-22 NOTE — TELEPHONE ENCOUNTER
"Requested Prescriptions   Pending Prescriptions Disp Refills     omeprazole (PRILOSEC) 20 MG DR capsule [Pharmacy Med Name: Omeprazole Oral Capsule Delayed Release 20 MG] 180 capsule 0          Last Written Prescription Date:  9/18/18  Last Fill Quantity: 180,  # refills: 0   Last Office Visit with [facility] or [facility] prescribing provider:  9/7/18   Future Office Visit:      Sig: TAKE TWO CAPSULES BY MOUTH DAILY    PPI Protocol Passed - 12/22/2018  7:09 AM       Passed - Not on Clopidogrel (unless Pantoprazole ordered)       Passed - No diagnosis of osteoporosis on record       Passed - Recent (12 mo) or future (30 days) visit within the authorizing provider's specialty    Patient had office visit in the last 12 months or has a visit in the next 30 days with authorizing provider or within the authorizing provider's specialty.  See \"Patient Info\" tab in inbasket, or \"Choose Columns\" in Meds & Orders section of the refill encounter.             Passed - Patient is age 18 or older       Passed - No active pregnacy on record       Passed - No positive pregnancy test in past 12 months              Nishant Faarax  [facility]  "

## 2018-12-27 ENCOUNTER — TRANSFERRED RECORDS (OUTPATIENT)
Dept: HEALTH INFORMATION MANAGEMENT | Facility: CLINIC | Age: 73
End: 2018-12-27

## 2019-03-21 ENCOUNTER — OFFICE VISIT (OUTPATIENT)
Dept: FAMILY MEDICINE | Facility: CLINIC | Age: 74
End: 2019-03-21
Payer: COMMERCIAL

## 2019-03-21 VITALS
SYSTOLIC BLOOD PRESSURE: 134 MMHG | DIASTOLIC BLOOD PRESSURE: 82 MMHG | OXYGEN SATURATION: 96 % | BODY MASS INDEX: 32.99 KG/M2 | HEIGHT: 65 IN | RESPIRATION RATE: 18 BRPM | TEMPERATURE: 98.2 F | WEIGHT: 198 LBS | HEART RATE: 78 BPM

## 2019-03-21 DIAGNOSIS — Z00.00 ROUTINE HISTORY AND PHYSICAL EXAMINATION OF ADULT: Primary | ICD-10-CM

## 2019-03-21 DIAGNOSIS — F41.1 GENERALIZED ANXIETY DISORDER: ICD-10-CM

## 2019-03-21 DIAGNOSIS — L40.9 PSORIASIS: ICD-10-CM

## 2019-03-21 DIAGNOSIS — Z87.891 PERSONAL HISTORY OF TOBACCO USE: ICD-10-CM

## 2019-03-21 DIAGNOSIS — F33.41 RECURRENT MAJOR DEPRESSIVE DISORDER, IN PARTIAL REMISSION (H): ICD-10-CM

## 2019-03-21 DIAGNOSIS — J31.0 CHRONIC RHINITIS: ICD-10-CM

## 2019-03-21 DIAGNOSIS — G40.909 SEIZURE DISORDER (H): ICD-10-CM

## 2019-03-21 DIAGNOSIS — S06.9X9S TRAUMATIC BRAIN INJURY WITH LOSS OF CONSCIOUSNESS, SEQUELA (H): ICD-10-CM

## 2019-03-21 PROCEDURE — G0439 PPPS, SUBSEQ VISIT: HCPCS | Performed by: FAMILY MEDICINE

## 2019-03-21 RX ORDER — TRIAMCINOLONE ACETONIDE 1 MG/G
CREAM TOPICAL
Qty: 80 G | Refills: 1 | Status: SHIPPED | OUTPATIENT
Start: 2019-03-21 | End: 2023-01-05

## 2019-03-21 RX ORDER — CITALOPRAM HYDROBROMIDE 20 MG/1
20 TABLET ORAL DAILY
Qty: 90 TABLET | Refills: 3 | Status: SHIPPED | OUTPATIENT
Start: 2019-03-21 | End: 2020-01-06

## 2019-03-21 RX ORDER — FLUTICASONE PROPIONATE 50 MCG
1-2 SPRAY, SUSPENSION (ML) NASAL DAILY
Qty: 16 G | Refills: 3 | Status: SHIPPED | OUTPATIENT
Start: 2019-03-21 | End: 2020-01-06

## 2019-03-21 RX ORDER — LAMOTRIGINE 200 MG/1
200 TABLET ORAL DAILY
Qty: 90 TABLET | Refills: 3
Start: 2019-03-21 | End: 2020-01-06

## 2019-03-21 ASSESSMENT — MIFFLIN-ST. JEOR: SCORE: 1414

## 2019-03-21 ASSESSMENT — PAIN SCALES - GENERAL: PAINLEVEL: NO PAIN (0)

## 2019-03-21 NOTE — PROGRESS NOTES
"  SUBJECTIVE:   Negin Jonas is a 73 year old female who presents for Preventive Visit.  Are you in the first 12 months of your Medicare Part B coverage?  No    Physical Health:    In general, how would you rate your overall physical health? good    Outside of work, how many days during the week do you exercise? none    Outside of work, approximately how many minutes a day do you exercise?not applicable    If you drink alcohol do you typically have >3 drinks per day or >7 drinks per week? No    Do you usually eat at least 4 servings of fruit and vegetables a day, include whole grains & fiber and avoid regularly eating high fat or \"junk\" foods? NO    Do you have any problems taking medications regularly?  No    Do you have any side effects from medications? none    Needs assistance for the following daily activities: telephone use    Which of the following safety concerns are present in your home?  none identified     Hearing impairment: No    In the past 6 months, have you been bothered by leaking of urine? no    Mental Health:    In general, how would you rate your overall mental or emotional health? good  PHQ-2 Score:      Do you feel safe in your environment? Yes    Do you have a Health Care Directive? No: Advance care planning reviewed with patient; information given to patient to review.    Additional concerns to address?  No    Fall risk:  Fallen 2 or more times in the past year?: No  Any fall with injury in the past year?: No    Cognitive Screenin) Repeat 3 items (Leader, Season, Table)    2) Clock draw: NORMAL  3) 3 item recall: Recalls NO objects   Results: 0 items recalled: PROBABLE COGNITIVE IMPAIRMENT, **INFORM PROVIDER**    Mini-CogTM Copyright KAYLAH Harper. Licensed by the author for use in [facility]; reprinted with permission (hipolito@.[facility]). All rights reserved.      Do you have sleep apnea, excessive snoring or daytime drowsiness?: no        Depression and Anxiety " Follow-Up    Status since last visit: No change    Other associated symptoms:None    Complicating factors:     Significant life event: No     Current substance abuse: None    PHQ 7/27/2016 11/13/2017 9/7/2018   PHQ-9 Total Score 5 1 9   Q9: Suicide Ideation Not at all Not at all Not at all     EWA-7 SCORE 7/23/2013 8/31/2015 7/27/2016   Total Score 6 - -   Total Score - 6 12       PHQ-9  English  PHQ-9   Any Language  EWA-7  Suicide Assessment Five-step Evaluation and Treatment (SAFE-T)    History of traumatic brain injury years ago and on seizure medication. Follow up with neurologist yearly for monitoring mental status and memory issues. No current issues or changes.     Reviewed and updated as needed this visit by clinical staff  Tobacco  Allergies  Meds  Med Hx  Surg Hx  Fam Hx  Soc Hx        Reviewed and updated as needed this visit by Provider        Social History     Tobacco Use     Smoking status: Former Smoker     Smokeless tobacco: Never Used     Tobacco comment: Quit 15 years ago.   Substance Use Topics     Alcohol use: No                           Current providers sharing in care for this patient include:   Patient Care Team:  Marlyn Arboleda MD as PCP - General (Family Practice)  Marlyn Arboleda MD as Assigned PCP    The following health maintenance items are reviewed in Epic and correct as of today:  Health Maintenance   Topic Date Due     ZOSTER IMMUNIZATION (2 of 3) 06/06/2014     ADVANCE DIRECTIVE PLANNING Q5 YRS  03/07/2017     MEDICARE ANNUAL WELLNESS VISIT  11/25/2017     FALL RISK ASSESSMENT  11/13/2018     FIT Q1 YR  11/22/2018     PHQ-9 Q6 MONTHS  03/07/2019     DEXA Q5 YR  10/08/2019     MAMMO SCREEN Q2 YR (SYSTEM ASSIGNED)  12/26/2019     LIPID SCREEN Q5 YR FEMALE (SYSTEM ASSIGNED)  11/24/2022     DTAP/TDAP/TD IMMUNIZATION (4 - Td) 02/15/2027     DEPRESSION ACTION PLAN  Completed     INFLUENZA VACCINE  Completed     PNEUMOCOCCAL IMMUNIZATION 65+ LOW/MEDIUM RISK  Completed      HEPATITIS C SCREENING  Completed     IPV IMMUNIZATION  Aged Out     MENINGITIS IMMUNIZATION  Aged Out     Labs reviewed in EPIC  BP Readings from Last 3 Encounters:   03/21/19 134/82   09/07/18 146/80   11/13/17 116/67    Wt Readings from Last 3 Encounters:   03/21/19 89.8 kg (198 lb)   09/07/18 82.6 kg (182 lb)   11/13/17 73.5 kg (162 lb)                  Patient Active Problem List   Diagnosis     CARDIOVASCULAR SCREENING; LDL GOAL LESS THAN 160     Chronic rhinitis     Psoriasis     Short-term memory loss     Generalized anxiety disorder     Panic attacks     Advanced directives, counseling/discussion     Health Care Home     Bursitis of right knee     Traumatic brain injury (H)     Vertigo     Seizure disorder (H)     Recurrent major depressive disorder, in partial remission (H)     Adhesive capsulitis of both shoulders     Past Surgical History:   Procedure Laterality Date     ESOPHAGOSCOPY, GASTROSCOPY, DUODENOSCOPY (EGD), COMBINED N/A 12/17/2015    Procedure: COMBINED ESOPHAGOSCOPY, GASTROSCOPY, DUODENOSCOPY (EGD);  Surgeon: Duane, William Charles, MD;  Location: [facility] OR     ESOPHAGOSCOPY, GASTROSCOPY, DUODENOSCOPY (EGD), COMBINED N/A 12/17/2015    Procedure: COMBINED ESOPHAGOSCOPY, GASTROSCOPY, DUODENOSCOPY (EGD), BIOPSY SINGLE OR MULTIPLE;  Surgeon: Duane, William Charles, MD;  Location: [facility] OR     HC REMOVAL GALLBLADDER       HYSTERECTOMY, PAP NO LONGER INDICATED  1992    with ovaries     RELEASE CARPAL TUNNEL BILATERAL         Social History     Tobacco Use     Smoking status: Former Smoker     Packs/day: 1.00     Years: 25.00     Pack years: 25.00     Smokeless tobacco: Never Used     Tobacco comment: Quit 15 years ago.   Substance Use Topics     Alcohol use: No     Family History   Problem Relation Age of Onset     C.A.D. Father 70        CABG x 3      Respiratory Father         emphysema     Psychotic Disorder Father         depression     Diabetes Mother      Psychotic Disorder Mother          "depression     Diabetes Maternal Grandfather      Hypertension Sister      Anxiety Disorder Sister      Respiratory Sister         emphysema         Current Outpatient Medications   Medication Sig Dispense Refill     aspirin (ASPIRIN ADULT LOW STRENGTH) 81 MG EC tablet Take 1 tablet (81 mg) by mouth daily 90 tablet 3     citalopram (CELEXA) 20 MG tablet Take 1 tablet (20 mg) by mouth daily 90 tablet 3     fluticasone (FLONASE) 50 MCG/ACT nasal spray Spray 1-2 sprays into both nostrils daily For nasal allergies 16 g 3     lamoTRIgine (LAMICTAL) 200 MG tablet Take 1 tablet (200 mg) by mouth daily Reported on 2/15/2017 90 tablet 3     omeprazole (PRILOSEC) 20 MG DR capsule TAKE TWO CAPSULES BY MOUTH DAILY 180 capsule 2     triamcinolone (KENALOG) 0.1 % external cream apply topically twice a day as needed for a maximum of 14 days 80 g 1     Allergies   Allergen Reactions     Codeine Sulfate      Recent Labs   Lab Test 09/07/18  1440 11/24/17  0800 07/28/16  0813 10/06/14  1007   LDL  --  100* 141* 140*   HDL  --  98 79 82   TRIG  --  62 89 66   ALT 22 22 25 24   CR 0.71 0.70 0.76 0.70   GFRESTIMATED 81 83 76 84   GFRESTBLACK >90 >90 >90   GFR Calc   >90   GFR Calc     POTASSIUM 4.8 4.1 4.2 4.4   TSH 2.90  --   --   --       Pneumonia Vaccine:Adults age 65+ who received Pneumovax (PPSV23) at 65 years or older: Should be given PCV13 > 1 year after their most recent PPSV23  Mammogram Screening: Mammogram Screening: Patient over age 50, mutual decision to screen reflected in health maintenance.    ROS:  Constitutional, HEENT, cardiovascular, pulmonary, gi and gu systems are negative, except as otherwise noted.    OBJECTIVE:   /88 (BP Location: Right arm, Patient Position: Chair, Cuff Size: Adult Large)   Pulse 78   Temp 98.2  F (36.8  C) (Oral)   Resp 18   Ht 1.651 m (5' 5\")   Wt 89.8 kg (198 lb)   SpO2 96%   BMI 32.95 kg/m   Estimated body mass index is 32.95 kg/m  as calculated " "from the following:    Height as of this encounter: 1.651 m (5' 5\").    Weight as of this encounter: 89.8 kg (198 lb).  EXAM:   GENERAL: elderly, alert, well nourished, well hydrated, no distress  HENT: ear canals- normal; TMs- normal; Nose- normal; Mouth- no ulcers, no lesions, missing dentition  NECK: no tenderness, no adenopathy, no asymmetry, no masses, no stiffness; thyroid- normal to palpation  RESP: lungs clear to auscultation - no rales, no rhonchi, no wheezes  CV: regular rates and rhythm, normal S1 S2, no S3 or S4 and no murmur, no click or rub, normal pulses  ABDOMEN: soft, no tenderness, no  hepatosplenomegaly, no masses, normal bowel sounds  MS: extremities- no gross deformities noted, no edema  SKIN: no suspicious lesions, no rashes, age related skin changes with seborrheic keratosis and no actinic keratosis.    NEURO: strength and tone- normal, sensory exam- grossly normal, reflexes- symmetric  BACK: no CVA tenderness, no paralumbar tenderness  MENTAL STATUS EXAM:  Appearance/Behavior: no apparent distress, neatly groomed, dressed appropriately for weather, appears stated age and is not frail-appearing  Speech: normal  Mood/Affect: normal affect  Insight: Fair     Diagnostic Test Results:  No results found for this or any previous visit (from the past 24 hour(s)).    ASSESSMENT / PLAN:       ICD-10-CM    1. Routine history and physical examination of adult Z00.00 Doing very well. Had some issues last year with increased dizziness, but these are largely resolved.    2. Recurrent major depressive disorder, in partial remission (H) F33.41 citalopram (CELEXA) 20 MG tablet- had to cut back from 40 mg to 20 mg due to age and would like to go back to a higher dose.    3. Seizure disorder (H) G40.909 lamoTRIgine (LAMICTAL) 200 MG tablet- refilled by neurology yearly and is taking it once a day not twice a day.   4. Traumatic brain injury with loss of consciousness, sequela (H) S06.9X9S Long-term sequela with " "memory issues and balance   5. Psoriasis L40.9 triamcinolone (KENALOG) 0.1 % external cream twice a day as needed.    6. Generalized anxiety disorder F41.1 citalopram (CELEXA) 20 MG tablet     aspirin (ASPIRIN ADULT LOW STRENGTH) 81 MG EC tablet   7. Chronic rhinitis J31.0 fluticasone (FLONASE) 50 MCG/ACT nasal spray   8. Personal history of tobacco use- not eligible for CT screening. Z87.891 Prof Fee: Shared Decision Making Visit for Lung Cancer Screening       End of Life Planning:  Patient currently has an advanced directive: No.  I have verified the patient's ablity to prepare an advanced directive/make health care decisions.  Literature was provided to assist patient in preparing an advanced directive.    COUNSELING:  Reviewed preventive health counseling, as reflected in patient instructions       Regular exercise       Healthy diet/nutrition       Vision screening       Hearing screening       Dental care       Bladder control       Osteoporosis Prevention/Bone Health    BP Readings from Last 1 Encounters:   03/21/19 154/88     Estimated body mass index is 32.95 kg/m  as calculated from the following:    Height as of this encounter: 1.651 m (5' 5\").    Weight as of this encounter: 89.8 kg (198 lb).      Weight management plan: Discussed healthy diet and exercise guidelines     reports that she has quit smoking. she has never used smokeless tobacco.      Appropriate preventive services were discussed with this patient, including applicable screening as appropriate for cardiovascular disease, diabetes, osteopenia/osteoporosis, and glaucoma.  As appropriate for age/gender, discussed screening for colorectal cancer, prostate cancer, breast cancer, and cervical cancer. Checklist reviewing preventive services available has been given to the patient.    Reviewed patients plan of care and provided an AVS. The Basic Care Plan (routine screening as documented in Health Maintenance) for Negin meets the Care Plan " requirement. This Care Plan has been established and reviewed with the Patient.    Counseling Resources:  ATP IV Guidelines  Pooled Cohorts Equation Calculator  Breast Cancer Risk Calculator  FRAX Risk Assessment  ICSI Preventive Guidelines  Dietary Guidelines for Americans, 2010  USDA's MyPlate  ASA Prophylaxis  Lung CA Screening    Marlyn Arboleda MD  [facility]  Lung Cancer Screening Shared Decision Making Visit     Negin Jonas is not eligible for lung cancer screening on the basis of the information provided in my signed lung cancer screening order. Negin's smoking history is below the threshold and so it is not recommended.    Patient is not currently a smoker and so we did not discuss that the only way to prevent lung cancer is to not smoke. Smoking cessation assistance was not offered.    ShouldIScreen

## 2019-03-21 NOTE — PATIENT INSTRUCTIONS
At [facility], we strive to deliver an exceptional experience to you, every time we see you.  If you receive a survey in the mail, please send us back your thoughts. We really do value your feedback.    Based on your medical history, these are the current health maintenance/preventive care services that you are due for (some may have been done at this visit.)  Health Maintenance Due   Topic Date Due     ZOSTER IMMUNIZATION (2 of 3) 06/06/2014     ADVANCE DIRECTIVE PLANNING Q5 YRS  03/07/2017     MEDICARE ANNUAL WELLNESS VISIT  11/25/2017     FALL RISK ASSESSMENT  11/13/2018     FIT Q1 YR  11/22/2018     PHQ-9 Q6 MONTHS  03/07/2019         Suggested websites for health information:  Www.rubberit.org : Up to date and easily searchable information on multiple topics.  Www.medlineplus.gov : medication info, interactive tutorials, watch real surgeries online  Www.familydoctor.org : good info from the Academy of Family Physicians  Www.cdc.gov : public health info, travel advisories, epidemics (H1N1)  Www.aap.org : children's health info, normal development, vaccinations  Www.health.[facility].[place].us : [place] dept of health, public health issues in [place], N1N1    Your care team:                            Family Medicine Internal Medicine   MD Kurt Bee MD Shantel Branch-Fleming, MD Katya Georgiev PA-C Nam Ho, MD Pediatrics   ERIKA Santos, LATASHA Colvin APRN MD Dorene [facility] MD Deborah Mielke, MD Kim Thein, APRN CNP      Clinic hours: Monday - Thursday 7 am-7 pm; Fridays 7 am-5 pm.   Urgent care: Monday - Friday 11 am-9 pm; Saturday and Sunday 9 am-5 pm.  Pharmacy : Monday -Thursday 8 am-8 pm; Friday 8 am-6 pm; Saturday and Sunday 9 am-5 pm.     Clinic: [phone]   Pharmacy: [phone]      Preventive Health Recommendations    See your health care provider every year to    Review health changes.     Discuss preventive  care.      Review your medicines if your doctor has prescribed any.      You no longer need a yearly Pap test unless you've had an abnormal Pap test in the past 10 years. If you have vaginal symptoms, such as bleeding or discharge, be sure to talk with your provider about a Pap test.      Every 1 to 2 years, have a mammogram.  If you are over 69, talk with your health care provider about whether or not you want to continue having screening mammograms.      Every 10 years, have a colonoscopy. Or, have a yearly FIT test (stool test). These exams will check for colon cancer.       Have a cholesterol test every 5 years, or more often if your doctor advises it.       Have a diabetes test (fasting glucose) every three years. If you are at risk for diabetes, you should have this test more often.       At age 65, have a bone density scan (DEXA) to check for osteoporosis (brittle bone disease).    Shots:    Get a flu shot each year.    Get a tetanus shot every 10 years.    Talk to your doctor about your pneumonia vaccines. There are now two you should receive - Pneumovax (PPSV 23) and Prevnar (PCV 13).    Talk to your pharmacist about the shingles vaccine.    Talk to your doctor about the hepatitis B vaccine.    Nutrition:     Eat at least 5 servings of fruits and vegetables each day.      Eat whole-grain bread, whole-wheat pasta and brown rice instead of white grains and rice.      Get adequate Calcium and Vitamin D.     Lifestyle    Exercise at least 150 minutes a week (30 minutes a day, 5 days a week). This will help you control your weight and prevent disease.      Limit alcohol to one drink per day.      No smoking.       Wear sunscreen to prevent skin cancer.       See your dentist twice a year for an exam and cleaning.      See your eye doctor every 1 to 2 years to screen for conditions such as glaucoma, macular degeneration and cataracts.    Personalized Prevention Plan  You are due for the preventive services  outlined below.  Your care team is available to assist you in scheduling these services.  If you have already completed any of these items, please share that information with your care team to update in your medical record.  Health Maintenance Due   Topic Date Due     Zoster (Shingles) Vaccine (2 of 3) 06/06/2014     Discuss Advance Directive Planning  03/07/2017     Annual Wellness Visit  11/25/2017     FALL RISK ASSESSMENT  11/13/2018     Colon Cancer Screening - FIT Test - yearly  11/22/2018     Depression Assessment - every 6 months  03/07/2019

## 2019-03-25 ENCOUNTER — DOCUMENTATION ONLY (OUTPATIENT)
Dept: LAB | Facility: CLINIC | Age: 74
End: 2019-03-25

## 2019-03-25 DIAGNOSIS — Z12.11 SPECIAL SCREENING FOR MALIGNANT NEOPLASMS, COLON: ICD-10-CM

## 2019-03-25 DIAGNOSIS — Z12.11 SPECIAL SCREENING FOR MALIGNANT NEOPLASMS, COLON: Primary | ICD-10-CM

## 2019-03-25 PROCEDURE — 82274 ASSAY TEST FOR BLOOD FECAL: CPT | Performed by: FAMILY MEDICINE

## 2019-03-25 NOTE — LETTER
[address]  [address]  [phone]    March 27, 2019      Negin Jonas  [address]  [place] [facility] [address]        Dear Negin Jonas,     The lab results from the most recent visit are all normal or in a good range.     There was no blood in the stool. Recheck in 1 year.     Please call me if you have any questions about your condition or care.     Sincerely,     Marlyn dean MD

## 2019-03-25 NOTE — PROGRESS NOTES
Patient has lab appointment today 03.25.19 for FIT drop off. Please sign Pending orders ASAP.  Thank you!

## 2019-03-26 LAB — HEMOCCULT STL QL IA: NEGATIVE

## 2019-03-26 NOTE — RESULT ENCOUNTER NOTE
Dear Negin Jonas,    The lab results from the most recent visit are all normal or in a good range.     There was no blood in the stool. Recheck in 1 year.    Please call me if you have any questions about your condition or care.     Sincerely,    Marlyn dean MD

## 2019-05-09 ENCOUNTER — OFFICE VISIT (OUTPATIENT)
Dept: FAMILY MEDICINE | Facility: CLINIC | Age: 74
End: 2019-05-09
Payer: COMMERCIAL

## 2019-05-09 ENCOUNTER — ANCILLARY PROCEDURE (OUTPATIENT)
Dept: GENERAL RADIOLOGY | Facility: CLINIC | Age: 74
End: 2019-05-09
Attending: FAMILY MEDICINE
Payer: COMMERCIAL

## 2019-05-09 VITALS
RESPIRATION RATE: 20 BRPM | TEMPERATURE: 97.4 F | WEIGHT: 202 LBS | HEART RATE: 73 BPM | HEIGHT: 65 IN | BODY MASS INDEX: 33.66 KG/M2 | SYSTOLIC BLOOD PRESSURE: 132 MMHG | OXYGEN SATURATION: 96 % | DIASTOLIC BLOOD PRESSURE: 79 MMHG

## 2019-05-09 DIAGNOSIS — G89.29 CHRONIC PAIN OF BOTH KNEES: ICD-10-CM

## 2019-05-09 DIAGNOSIS — M25.561 CHRONIC PAIN OF BOTH KNEES: ICD-10-CM

## 2019-05-09 DIAGNOSIS — M25.562 CHRONIC PAIN OF BOTH KNEES: ICD-10-CM

## 2019-05-09 DIAGNOSIS — M25.561 CHRONIC PAIN OF BOTH KNEES: Primary | ICD-10-CM

## 2019-05-09 DIAGNOSIS — M20.5X1 CROSSOVER TOE DEFORMITY OF RIGHT FOOT: ICD-10-CM

## 2019-05-09 DIAGNOSIS — M25.562 CHRONIC PAIN OF BOTH KNEES: Primary | ICD-10-CM

## 2019-05-09 DIAGNOSIS — G89.29 CHRONIC PAIN OF BOTH KNEES: Primary | ICD-10-CM

## 2019-05-09 PROCEDURE — 73560 X-RAY EXAM OF KNEE 1 OR 2: CPT | Mod: LT

## 2019-05-09 PROCEDURE — 99214 OFFICE O/P EST MOD 30 MIN: CPT | Performed by: FAMILY MEDICINE

## 2019-05-09 ASSESSMENT — ANXIETY QUESTIONNAIRES
3. WORRYING TOO MUCH ABOUT DIFFERENT THINGS: SEVERAL DAYS
5. BEING SO RESTLESS THAT IT IS HARD TO SIT STILL: NOT AT ALL
IF YOU CHECKED OFF ANY PROBLEMS ON THIS QUESTIONNAIRE, HOW DIFFICULT HAVE THESE PROBLEMS MADE IT FOR YOU TO DO YOUR WORK, TAKE CARE OF THINGS AT HOME, OR GET ALONG WITH OTHER PEOPLE: SOMEWHAT DIFFICULT
1. FEELING NERVOUS, ANXIOUS, OR ON EDGE: NOT AT ALL
GAD7 TOTAL SCORE: 4
2. NOT BEING ABLE TO STOP OR CONTROL WORRYING: NOT AT ALL
7. FEELING AFRAID AS IF SOMETHING AWFUL MIGHT HAPPEN: SEVERAL DAYS
6. BECOMING EASILY ANNOYED OR IRRITABLE: SEVERAL DAYS

## 2019-05-09 ASSESSMENT — MIFFLIN-ST. JEOR: SCORE: 1427.15

## 2019-05-09 ASSESSMENT — PAIN SCALES - GENERAL: PAINLEVEL: SEVERE PAIN (7)

## 2019-05-09 ASSESSMENT — PATIENT HEALTH QUESTIONNAIRE - PHQ9
5. POOR APPETITE OR OVEREATING: SEVERAL DAYS
SUM OF ALL RESPONSES TO PHQ QUESTIONS 1-9: 9

## 2019-05-09 NOTE — PATIENT INSTRUCTIONS
At [facility], we strive to deliver an exceptional experience to you, every time we see you.  If you receive a survey in the mail, please send us back your thoughts. We really do value your feedback.    Based on your medical history, these are the current health maintenance/preventive care services that you are due for (some may have been done at this visit.)  Health Maintenance Due   Topic Date Due     ZOSTER IMMUNIZATION (2 of 3) 06/06/2014     ADVANCE DIRECTIVE PLANNING Q5 YRS  03/07/2017     PHQ-9 Q6 MONTHS  03/07/2019         Suggested websites for health information:  Www.[employer].HigherNext : Up to date and easily searchable information on multiple topics.  Www."[employer].gov : medication info, interactive tutorials, watch real surgeries online  Www.familydoctor.org : good info from the Academy of Family Physicians  Www.cdc.gov : public health info, travel advisories, epidemics (H1N1)  Www.aap.org : children's health info, normal development, vaccinations  Www.health.[facility].[place].us : [place] dept of health, public health issues in [place], N1N1    Your care team:                            Family Medicine Internal Medicine   MD Kurt Bee MD Shantel Branch-Fleming, MD Katya Georgiev PA-C Nam Ho, MD Pediatrics   ERIKA Santos, MD Dorene Babcock CNP, MD Deborah Mielke, MD Kim Thein, APRN CNP      Clinic hours: Monday - Thursday 7 am-7 pm; Fridays 7 am-5 pm.   Urgent care: Monday - Friday 11 am-9 pm; Saturday and Sunday 9 am-5 pm.  Pharmacy : Monday -Thursday 8 am-8 pm; Friday 8 am-6 pm; Saturday and Sunday 9 am-5 pm.     Clinic: [phone]   Pharmacy: [phone]      Patient Education     Knee Pain  Knee pain is very common. It s especially common in active people who put a lot of pressure on their knees, like runners. It affects women more often than men.  Your kneecap (patella) is a thick, round bone. It  covers and protects the front portion of your knee joint. It moves along a groove in your thighbone (femur) as part of the patellofemoral joint. A layer of cartilage surrounds the underside of your kneecap. This layer protects it from grinding against your femur.  When this cartilage softens and breaks down, it can cause knee pain. This is partly because of repetitive stress. The stress irritates the lining of the joint. This causes pain in the underlying bone.  What causes knee pain?  Many things can cause knee pain. You may have more than one cause. Some of these include:    Overuse of the knee joint    The kneecap doesn t line up with the tissue around it    Damage to small nerves in the area    Damage to the ligament-like structure that holds the kneecap in place (retinaculum)    Breakdown of the bone under the cartilage    Swelling in the soft tissues around the kneecap    Injury  You might be more likely to have knee pain if you:    Exercise a lot    Recently increased the intensity of your workouts    Have a body mass index (BMI) greater than 25    Have poor alignment of your kneecap    Walk with your feet turned overly outward or inward    Have weakness in surrounding muscle groups (inner quad or hip adductor muscles)    Have too much tightness in surrounding muscle groups (hamstrings or iliotibial band)    Have a recent history of injury to the area    Are female  Symptoms of knee pain  This type of knee pain is a dull, aching pain in the front of the knee in the area under and around the kneecap. This pain may start quickly or slowly. Your pain might be worse when you squat, run, or sit for a long time. You might also sometimes feel like your knee is giving out. You may have symptoms in one or both of your knees.  Diagnosing knee pain  Your healthcare provider will ask about your medical history and your symptoms. Be sure to describe any activities that make your knee pain worse. He or she will look at  your knee. This will include tests of your range of motion, strength, and areas of pain of your knee. Your knee alignment will be checked.  Your healthcare provider will need to rule out other causes of your knee pain, such as arthritis. You may need an imaging test, such as an X-ray or MRI.  Treatment for knee pain  Treatments that can help ease your symptoms may include:    Avoiding activities for a while that make your pain worse, returning to activity over time    Icing the outside of your knee when it causes you pain    Taking over-the-counter pain medicine    Wearing a knee brace or taping your knee to support it    Wearing special shoe inserts to help keep your feet in the proper alignment    Doing special exercises to stretch and strengthen the muscles around your hip and your knee  These steps help most people manage knee pain. But some cases of knee pain need to be treated with surgery. You may need surgery right away. Or you may need it later if other treatments don t work. Your healthcare provider may refer you to an orthopedic surgeon. He or she will talk with you about your choices.  Preventing knee pain  Losing weight and correcting excess muscle tightness or muscle weakness may help lower your risk.  In some cases, you can prevent knee pain. To help prevent a flare-up of knee pain, you do these things:    Regularly do all the exercises your doctor or physical therapist advises    Support your knee as advised by your doctor or physical therapist    Increase training gradually, and ease up on training when needed    Have an expert check your gait for running or other sporting activities    Stretch properly before and after exercise    Replace your running shoes regularly    Lose excess weight     When to call your healthcare provider  Call your healthcare provider right away if:    Your symptoms don t get better after a few weeks of treatment    You have any new symptoms   Date Last Reviewed:  4/1/2017 2000-2018 [employer]. [address] [place] [address]. All rights reserved. This information is not intended as a substitute for professional medical care. Always follow your healthcare professional's instructions.           Patient Education     The Kneecap (Patella) in Action  As the leg moves, the kneecap moves, too. It slides up and down its track on the thighbone. But if the kneecap slides  off track --even a little--pain and damage can result.  When the kneecap is  on track   The kneecap is controlled by muscles and ligaments that work like a system of pulleys. This system includes the quadriceps muscles, retinacula, and patellar tendon. If all these parts pull in just the right way, the kneecap stays in place and glides easily in its track. Pressure is spread evenly on the back of the kneecap.     Kneecap on track   When the kneecap gets  off track   An injury can cause some muscles or ligaments to pull too hard or not hard enough. When that happens, the kneecap no longer glides easily against the thighbone. The kneecap may even tilt. Pressure may be spread unevenly on the back of the kneecap, causing wear and tear on the cartilage.     Kneecap off track   Date Last Reviewed: 5/1/2018 2000-2018 [employer]. [address] [place] [address]. All rights reserved. This information is not intended as a substitute for professional medical care. Always follow your healthcare professional's instructions.           Patient Education     What Are Mallet, Hammer, and Claw Toes?  Mallet, hammer, and claw toes are most often caused by wearing shoes that are too short or heels that are too high. This jams the toes against the front of the shoe and causes one or more joints to bend. Rarely, disease can cause the joints in the toes to bend. Mallet, hammer, and claw toes are among the most common toe problems. They occur most often in the longest of the four  smaller toes.    Inside your toes  There are 3 bones in each of your 4 smaller toes. Where 2 bones connect is called a joint. Normally the toes lie flat. But pressure on the toes or the front of the foot can cause one or more joints to bend. This curls the toe. Toes that stay curled are called mallet toes, hammer toes, or claw toes, depending on which joints are bent.    Symptoms  You may feel pain in the toe or in the ball of your foot. A corn (a hard growth of skin on the top of the toe) may form where the toe rubs against the top of the shoe. Or a callus (a hard growth of skin on the bottom of the foot) may form under the tip of the toe or on the ball of the foot. Corns and calluses can also be painful.   Date Last Reviewed: 5/1/2018 2000-2018 [employer]. [address] [place] [address], [place] [address]. All rights reserved. This information is not intended as a substitute for professional medical care. Always follow your healthcare professional's instructions.

## 2019-05-09 NOTE — LETTER
May 13, 2019      Negin Jonas  [address]  [place] [address]        Dear ,    We are writing to inform you of your test results. The x ray shows moderate degenerative arthritis in both knees with the left knee being the worse. You may find some relief with knee injections. Let me know if you are interested in a referral for this.     If you have any questions or concerns, please call the clinic at the number listed above.       Sincerely,  Marlyn Arboleda MD/asuncion    Resulted Orders   XR Knee Bilateral 1/2 Views    Narrative    XR KNEE BILATERAL 1/2 VW -  5/9/2019 11:40 AM     HISTORY:  Chronic pain of both knees; Chronic pain of both knees;  Chronic pain of both knees.     COMPARISON: 8/31/2015.      Impression    IMPRESSION: There is moderate tricompartmental degenerative joint  disease bilaterally. The left medial compartment is most severely  affected. There is no evidence of fracture.     AKOSUA TENORIO MD

## 2019-05-09 NOTE — PROGRESS NOTES
SUBJECTIVE:   Negin Jonas is a 74 year old female who presents to clinic today for the following health issues:    HPI  Joint Pain    Onset: 2 months    Description:   Location: Bilateral knees, right 2nd toe  Character: Toe pain constant dull ache then sharp with walking, knee pain dull ache with increased pain when gives out    Intensity: Toe pain with walking 7/10, Knee pain 4/10    Progression of Symptoms: worse    Accompanying Signs & Symptoms:  Other symptoms: knees give out can feel the slipping, toe stiffness    History:   Previous similar pain: YES- knees but not toe      Precipitating factors:   Trauma or overuse: no trauma but walking/going up stairs makes it worse    Alleviating factors:  Improved by: resting    Therapies Tried and outcome: Hx physical therapy for knees in the past        Additional history: as documented    Reviewed and updated as needed this visit by clinical staff         Reviewed and updated as needed this visit by Provider             Patient Active Problem List   Diagnosis     CARDIOVASCULAR SCREENING; LDL GOAL LESS THAN 160     Chronic rhinitis     Psoriasis     Short-term memory loss     Generalized anxiety disorder     Panic attacks     Advanced directives, counseling/discussion     Health Care Home     Bursitis of right knee     Traumatic brain injury (H)     Vertigo     Seizure disorder (H)     Recurrent major depressive disorder, in partial remission (H)     Adhesive capsulitis of both shoulders     Past Surgical History:   Procedure Laterality Date     ESOPHAGOSCOPY, GASTROSCOPY, DUODENOSCOPY (EGD), COMBINED N/A 12/17/2015    Procedure: COMBINED ESOPHAGOSCOPY, GASTROSCOPY, DUODENOSCOPY (EGD);  Surgeon: Duane, William Charles, MD;  Location:  OR     ESOPHAGOSCOPY, GASTROSCOPY, DUODENOSCOPY (EGD), COMBINED N/A 12/17/2015    Procedure: COMBINED ESOPHAGOSCOPY, GASTROSCOPY, DUODENOSCOPY (EGD), BIOPSY SINGLE OR MULTIPLE;  Surgeon: Duane, William Charles, MD;  Location:  [facility] OR     HC REMOVAL GALLBLADDER       HYSTERECTOMY, PAP NO LONGER INDICATED  1992    with ovaries     RELEASE CARPAL TUNNEL BILATERAL         Social History     Tobacco Use     Smoking status: Former Smoker     Packs/day: 1.00     Years: 25.00     Pack years: 25.00     Smokeless tobacco: Never Used     Tobacco comment: Quit 15 years ago.   Substance Use Topics     Alcohol use: No     Family History   Problem Relation Age of Onset     C.A.D. Father 70        CABG x 3      Respiratory Father         emphysema     Psychotic Disorder Father         depression     Diabetes Mother      Psychotic Disorder Mother         depression     Diabetes Maternal Grandfather      Hypertension Sister      Anxiety Disorder Sister      Respiratory Sister         emphysema         Current Outpatient Medications   Medication Sig Dispense Refill     aspirin (ASPIRIN ADULT LOW STRENGTH) 81 MG EC tablet Take 1 tablet (81 mg) by mouth daily 90 tablet 3     citalopram (CELEXA) 20 MG tablet Take 1 tablet (20 mg) by mouth daily 90 tablet 3     fluticasone (FLONASE) 50 MCG/ACT nasal spray Spray 1-2 sprays into both nostrils daily For nasal allergies 16 g 3     lamoTRIgine (LAMICTAL) 200 MG tablet Take 1 tablet (200 mg) by mouth daily Reported on 2/15/2017 90 tablet 3     omeprazole (PRILOSEC) 20 MG DR capsule TAKE TWO CAPSULES BY MOUTH DAILY 180 capsule 2     triamcinolone (KENALOG) 0.1 % external cream apply topically twice a day as needed for a maximum of 14 days 80 g 1     Allergies   Allergen Reactions     Codeine Sulfate      Recent Labs   Lab Test 09/07/18  1440 11/24/17  0800 07/28/16  0813 10/06/14  1007   LDL  --  100* 141* 140*   HDL  --  98 79 82   TRIG  --  62 89 66   ALT 22 22 25 24   CR 0.71 0.70 0.76 0.70   GFRESTIMATED 81 83 76 84   GFRESTBLACK >90 >90 >90   GFR Calc   >90   GFR Calc     POTASSIUM 4.8 4.1 4.2 4.4   TSH 2.90  --   --   --       BP Readings from Last 3 Encounters:   05/09/19 132/79  "  03/21/19 134/82   09/07/18 146/80    Wt Readings from Last 3 Encounters:   05/09/19 91.6 kg (202 lb)   03/21/19 89.8 kg (198 lb)   09/07/18 82.6 kg (182 lb)                  Labs reviewed in EPIC    ROS:  Constitutional, HEENT, cardiovascular, pulmonary, gi and gu systems are negative, except as otherwise noted.    OBJECTIVE:     /79 (BP Location: Left arm, Patient Position: Chair, Cuff Size: Adult Large)   Pulse 73   Temp 97.4  F (36.3  C) (Oral)   Resp 20   Ht 1.651 m (5' 5\")   Wt 91.6 kg (202 lb)   SpO2 96%   BMI 33.61 kg/m    Body mass index is 33.61 kg/m .  GENERAL: healthy, alert and no distress  EYES: Eyes grossly normal to inspection, conjunctivae and sclerae normal  MS: no gross musculoskeletal defects noted, Toes both feet crossing over- right worse than left with callus on 2nd toe where it rubs against large toe. Both knees with decreased ROM, no effusion, stable, some patella crepitans with ROM.   SKIN: no suspicious lesions or rashes  NEURO: Normal strength and tone, gait and speech normal  PSYCH: mentation appears normal, affect normal/bright     Diagnostic Test Results:  No results found for this or any previous visit (from the past 24 hour(s)).    ASSESSMENT/PLAN:         Tobacco Cessation:   reports that she has quit smoking. She has a 25.00 pack-year smoking history. She has never used smokeless tobacco.      BMI:   Estimated body mass index is 33.61 kg/m  as calculated from the following:    Height as of this encounter: 1.651 m (5' 5\").    Weight as of this encounter: 91.6 kg (202 lb).   Weight management plan: Discussed healthy diet and exercise guidelines      1. Chronic pain of both knees  Most likely due to osteoarthritis both knees and some patellofemoral ligament dysfunction.   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       CONSULTATION/REFERRAL to podiatry as needed.   FUTURE LABS:       - Schedule fasting labs in 6 months  FUTURE APPOINTMENTS:       - Follow-up visit in 6 months or sooner if any questions or concerns.   See Patient Instructions    Marlyn Arboleda MD  [facility]

## 2019-05-10 ASSESSMENT — ANXIETY QUESTIONNAIRES: GAD7 TOTAL SCORE: 4

## 2019-05-11 NOTE — RESULT ENCOUNTER NOTE
Dear Negin Jonas,    Your test results are attached.     The x ray shows moderate degenerative arthritis in both knees with the left knee being the worse. You may find some relief with knee injections. Let me know if you are interested in a referral for this.    Please call me if you have any questions about these test results or about your care.    Sincerely,    Marlyn Arboleda MD

## 2019-05-20 ENCOUNTER — TELEPHONE (OUTPATIENT)
Dept: FAMILY MEDICINE | Facility: CLINIC | Age: 74
End: 2019-05-20

## 2019-05-20 DIAGNOSIS — M17.0 PRIMARY OSTEOARTHRITIS OF BOTH KNEES: ICD-10-CM

## 2019-05-20 NOTE — TELEPHONE ENCOUNTER
Called and left msg for patient regarding Dr. Arboleda's note below.  Iglesia Vela,  For [facility]

## 2019-05-20 NOTE — TELEPHONE ENCOUNTER
PATIENT wants referral to DR MERCER FOR her knee , orthopedics she is getting appt. May 28    What is the best number to contact you?[phone]    Cell  What time works best to contact you? danisha DIAZ

## 2019-05-28 ENCOUNTER — OFFICE VISIT (OUTPATIENT)
Dept: ORTHOPEDICS | Facility: CLINIC | Age: 74
End: 2019-05-28
Payer: COMMERCIAL

## 2019-05-28 VITALS
DIASTOLIC BLOOD PRESSURE: 74 MMHG | HEIGHT: 65 IN | RESPIRATION RATE: 17 BRPM | HEART RATE: 69 BPM | SYSTOLIC BLOOD PRESSURE: 134 MMHG | OXYGEN SATURATION: 96 % | WEIGHT: 200 LBS | BODY MASS INDEX: 33.32 KG/M2

## 2019-05-28 DIAGNOSIS — M17.0 PRIMARY OSTEOARTHRITIS OF BOTH KNEES: Primary | ICD-10-CM

## 2019-05-28 PROCEDURE — 20610 DRAIN/INJ JOINT/BURSA W/O US: CPT | Mod: 50 | Performed by: ORTHOPAEDIC SURGERY

## 2019-05-28 PROCEDURE — 99203 OFFICE O/P NEW LOW 30 MIN: CPT | Mod: 25 | Performed by: ORTHOPAEDIC SURGERY

## 2019-05-28 ASSESSMENT — MIFFLIN-ST. JEOR: SCORE: 1418.07

## 2019-05-28 NOTE — PATIENT INSTRUCTIONS
Options for osteoarthritis.    Weight loss.  Legs feel better the lighter you are.  Stay active - walking, bicycle, swimming.  Avoid high impact.  Vitamins - Glucosamine 1500 mg/day and chondroitin sulfate 1200 mg/day. It works for dogs and horses, no proof it helps people.  Non-medical options (other things I've heard of):  * tart cherry juice is thought to be a natural anti-inflammatory.    *1 tablespoon of apple cider vinegar daily  *Turmeric is thought to be a natural anti-inflammatory.  *1 tablespoon of unflavored gelatin daily, [place] Gelatin or [employer] Nutrajoint  *Ointments on joints - Icy Hot, BenGay, Capsaicin cream, Mineral Ice, Flexall, diclofenac gel.  Tylenol up to 3000 mg / day.  NSAIDs - Aleve up to 4 tablets per day or ibuprofen up to 12 tablets per day.   Prescription forms.  Steroid injection - cortisone.      You have had a steroid injection today.  For the first 2 hours there will likely be some numbing in the joint from the lidocaine.  This is a good sign, indicating that the injection is in the right place.  In 2 hours the lidocaine will wear off, and the joint will hurt like you had a shot.  Each day the cortisone makes it feel better.  It reaches peak effect in 2 weeks.  We expect it to last for 3 months.  You may resume regular activity when you feel ready.  If you are diabetic, your glucoses will be quite high for several days.

## 2019-05-28 NOTE — PROGRESS NOTES
The patient's bilateral knee was prepped with betadine solution after verification of allergies. Area approximately 10 cm x 10 cm prepped in a sterile fashion. After injection, betadine removed with soap and water and band-aids applied.    2ml depo medrol with 1% lidocaine plain injected into patient's bilateral knee by Dr. Hamilton Martinez  LOT# [id number]  Exp. 1/20

## 2019-05-28 NOTE — LETTER
5/28/2019         RE: Negin Jonas  [address]  [facility] [address]        Dear Colleague,    Thank you for referring your patient, Negin Jonas, to the [facility]. Please see a copy of my visit note below.    The patient's bilateral knee was prepped with betadine solution after verification of allergies. Area approximately 10 cm x 10 cm prepped in a sterile fashion. After injection, betadine removed with soap and water and band-aids applied.    2ml depo medrol with 1% lidocaine plain injected into patient's bilateral knee by Dr. Hamilton Martinez  LOT# [id number]  Exp. 1/20      Negin Jonas is a 74 year old female who is seen in consultation at the request of Dr Marlyn Arboleda  for bilateral knee pain.      Past Medical History:   Diagnosis Date     Advanced directives, counseling/discussion 3/7/2012     Anxiety attack 2006    EEG normal, improved with lamictal     Chronic rhinitis 1/31/2011     Closed head injury 2004    subarachnoid hemorrage/cerebral contusion with sequellae of significant diminished short term memory, word finding difficulties and emotional lability, was in coma for 2 months, on seizure prophylaxsis with lamictal, f/b neuro annually     Generalized anxiety disorder      Major depression      Major depression in complete remission (H) 1/31/2011    was seen by psychiatrist in past, has tried Effexor and Seroquel and Hiram's Wart     Migraine headache     resolved s/p closed head injury     Panic attacks      Psoriasis 1/31/2011     Short-term memory loss 2004    secondary to closed head injury       Past Surgical History:   Procedure Laterality Date     ESOPHAGOSCOPY, GASTROSCOPY, DUODENOSCOPY (EGD), COMBINED N/A 12/17/2015    Procedure: COMBINED ESOPHAGOSCOPY, GASTROSCOPY, DUODENOSCOPY (EGD);  Surgeon: Duane, William Charles, MD;  Location:  OR     ESOPHAGOSCOPY, GASTROSCOPY, DUODENOSCOPY (EGD), COMBINED N/A 12/17/2015    Procedure:  COMBINED ESOPHAGOSCOPY, GASTROSCOPY, DUODENOSCOPY (EGD), BIOPSY SINGLE OR MULTIPLE;  Surgeon: Duane, William Charles, MD;  Location: [facility] OR     HC REMOVAL GALLBLADDER       HYSTERECTOMY, PAP NO LONGER INDICATED  1992    with ovaries     RELEASE CARPAL TUNNEL BILATERAL         Family History   Problem Relation Age of Onset     C.A.D. Father 70        CABG x 3      Respiratory Father         emphysema     Psychotic Disorder Father         depression     Diabetes Mother      Psychotic Disorder Mother         depression     Diabetes Maternal Grandfather      Hypertension Sister      Anxiety Disorder Sister      Respiratory Sister         emphysema       Social History     Socioeconomic History     Marital status:      Spouse name: Not on file     Number of children: Not on file     Years of education: Not on file     Highest education level: Not on file   Occupational History     Not on file   Social Needs     Financial resource strain: Not on file     Food insecurity:     Worry: Not on file     Inability: Not on file     Transportation needs:     Medical: Not on file     Non-medical: Not on file   Tobacco Use     Smoking status: Former Smoker     Packs/day: 1.00     Years: 25.00     Pack years: 25.00     Smokeless tobacco: Never Used     Tobacco comment: Quit 15 years ago.   Substance and Sexual Activity     Alcohol use: No     Drug use: No     Sexual activity: Never   Lifestyle     Physical activity:     Days per week: Not on file     Minutes per session: Not on file     Stress: Not on file   Relationships     Social connections:     Talks on phone: Not on file     Gets together: Not on file     Attends [employer] service: Not on file     Active member of club or organization: Not on file     Attends meetings of clubs or organizations: Not on file     Relationship status: Not on file     Intimate partner violence:     Fear of current or ex partner: Not on file     Emotionally abused: Not on file     Physically  abused: Not on file     Forced sexual activity: Not on file   Other Topics Concern     Parent/sibling w/ CABG, MI or angioplasty before 65F 55M? Not Asked   Social History Narrative     Not on file       Current Outpatient Medications   Medication Sig Dispense Refill     aspirin (ASPIRIN ADULT LOW STRENGTH) 81 MG EC tablet Take 1 tablet (81 mg) by mouth daily 90 tablet 3     citalopram (CELEXA) 20 MG tablet Take 1 tablet (20 mg) by mouth daily 90 tablet 3     fluticasone (FLONASE) 50 MCG/ACT nasal spray Spray 1-2 sprays into both nostrils daily For nasal allergies 16 g 3     lamoTRIgine (LAMICTAL) 200 MG tablet Take 1 tablet (200 mg) by mouth daily Reported on 2/15/2017 90 tablet 3     methylPREDNISolone (DEPO-MEDROL) 80 MG/ML injection 2 mLs (160 mg) by INTRA-ARTICULAR route once for 1 dose 2 mL 0     omeprazole (PRILOSEC) 20 MG DR capsule TAKE TWO CAPSULES BY MOUTH DAILY 180 capsule 2     triamcinolone (KENALOG) 0.1 % external cream apply topically twice a day as needed for a maximum of 14 days 80 g 1       Allergies   Allergen Reactions     Codeine Sulfate        REVIEW OF SYSTEMS:  CONSTITUTIONAL:  NEGATIVE for fever, chills, change in weight, not feeling tired  SKIN:  NEGATIVE for worrisome rashes, no skin lumps, no skin ulcers and no non-healing wounds  EYES:  NEGATIVE for vision changes or irritation.  ENT/MOUTH:  NEGATIVE.  No hearing loss, no hoarseness, no difficulty swallowing.  RESP:  NEGATIVE. No cough or shortness of breath.  CV:  NEGATIVE for chest pain, palpitations or peripheral edema  GI:  NEGATIVE for nausea, abdominal pain, heartburn, or change in bowel habits  :  Negative. No dysuria, no hematuria  MUSCULOSKELETAL:  See HPI above  NEURO:  NEGATIVE . No headaches, no dizziness,  no numbness  ENDOCRINE:  NEGATIVE for temperature intolerance, skin/hair changes  HEME/ALLERGY/IMMUNE:  NEGATIVE for bleeding problems  PSYCHIATRIC:  NEGATIVE. no anxiety, no depression.      Exam:  Vitals: /74    "Pulse 69   Resp 17   Ht 1.651 m (5' 5\")   Wt 90.7 kg (200 lb)   SpO2 96%   BMI 33.28 kg/m     BMI= Body mass index is 33.28 kg/m .  Constitutional:  healthy, alert and no distress  Neuro: Alert and Oriented x 3, Sensation grossly WNL.  HEENT:  Atraumatic, EOMI  Neck:  Neck supple with no tenderness.  Psych: Affect normal   Respiratory: Breathing not labored.  Cardiovascular: normal peripheral pulses  Lymph: no adenopathy  Skin: No rashes,worrisome lesions or skin problems  Spine: straight, no straight leg raising pain.  Hips show full range of motion.  There is no tenderness over the sacro-iliac joints, sciatic notch, or greater trochanters.       Again, thank you for allowing me to participate in the care of your patient.        Sincerely,        Hamilton Martinez MD    "

## 2019-06-02 RX ORDER — METHYLPREDNISOLONE ACETATE 80 MG/ML
160 INJECTION, SUSPENSION INTRA-ARTICULAR; INTRALESIONAL; INTRAMUSCULAR; SOFT TISSUE ONCE
Qty: 2 ML | Refills: 0 | OUTPATIENT
Start: 2019-06-02 | End: 2019-06-02

## 2019-06-02 NOTE — PROGRESS NOTES
Negin Jonas is a 74 year old female who is seen in consultation at the request of Dr Marlyn Arboleda  for bilateral knee pain.      Past Medical History:   Diagnosis Date     Advanced directives, counseling/discussion 3/7/2012     Anxiety attack 2006    EEG normal, improved with lamictal     Chronic rhinitis 1/31/2011     Closed head injury 2004    subarachnoid hemorrage/cerebral contusion with sequellae of significant diminished short term memory, word finding difficulties and emotional lability, was in coma for 2 months, on seizure prophylaxsis with lamictal, f/b neuro annually     Generalized anxiety disorder      Major depression      Major depression in complete remission (H) 1/31/2011    was seen by psychiatrist in past, has tried Effexor and Seroquel and Hiram's Wart     Migraine headache     resolved s/p closed head injury     Panic attacks      Psoriasis 1/31/2011     Short-term memory loss 2004    secondary to closed head injury       Past Surgical History:   Procedure Laterality Date     ESOPHAGOSCOPY, GASTROSCOPY, DUODENOSCOPY (EGD), COMBINED N/A 12/17/2015    Procedure: COMBINED ESOPHAGOSCOPY, GASTROSCOPY, DUODENOSCOPY (EGD);  Surgeon: Duane, William Charles, MD;  Location: [facility] OR     ESOPHAGOSCOPY, GASTROSCOPY, DUODENOSCOPY (EGD), COMBINED N/A 12/17/2015    Procedure: COMBINED ESOPHAGOSCOPY, GASTROSCOPY, DUODENOSCOPY (EGD), BIOPSY SINGLE OR MULTIPLE;  Surgeon: Duane, William Charles, MD;  Location: [facility] OR     HC REMOVAL GALLBLADDER       HYSTERECTOMY, PAP NO LONGER INDICATED  1992    with ovaries     RELEASE CARPAL TUNNEL BILATERAL         Family History   Problem Relation Age of Onset     C.A.D. Father 70        CABG x 3      Respiratory Father         emphysema     Psychotic Disorder Father         depression     Diabetes Mother      Psychotic Disorder Mother         depression     Diabetes Maternal Grandfather      Hypertension Sister      Anxiety Disorder Sister      Respiratory Sister          emphysema       Social History     Socioeconomic History     Marital status:      Spouse name: Not on file     Number of children: Not on file     Years of education: Not on file     Highest education level: Not on file   Occupational History     Not on file   Social Needs     Financial resource strain: Not on file     Food insecurity:     Worry: Not on file     Inability: Not on file     Transportation needs:     Medical: Not on file     Non-medical: Not on file   Tobacco Use     Smoking status: Former Smoker     Packs/day: 1.00     Years: 25.00     Pack years: 25.00     Smokeless tobacco: Never Used     Tobacco comment: Quit 15 years ago.   Substance and Sexual Activity     Alcohol use: No     Drug use: No     Sexual activity: Never   Lifestyle     Physical activity:     Days per week: Not on file     Minutes per session: Not on file     Stress: Not on file   Relationships     Social connections:     Talks on phone: Not on file     Gets together: Not on file     Attends [employer] service: Not on file     Active member of club or organization: Not on file     Attends meetings of clubs or organizations: Not on file     Relationship status: Not on file     Intimate partner violence:     Fear of current or ex partner: Not on file     Emotionally abused: Not on file     Physically abused: Not on file     Forced sexual activity: Not on file   Other Topics Concern     Parent/sibling w/ CABG, MI or angioplasty before 65F 55M? Not Asked   Social History Narrative     Not on file       Current Outpatient Medications   Medication Sig Dispense Refill     aspirin (ASPIRIN ADULT LOW STRENGTH) 81 MG EC tablet Take 1 tablet (81 mg) by mouth daily 90 tablet 3     citalopram (CELEXA) 20 MG tablet Take 1 tablet (20 mg) by mouth daily 90 tablet 3     fluticasone (FLONASE) 50 MCG/ACT nasal spray Spray 1-2 sprays into both nostrils daily For nasal allergies 16 g 3     lamoTRIgine (LAMICTAL) 200 MG tablet Take 1 tablet (200 mg)  "by mouth daily Reported on 2/15/2017 90 tablet 3     methylPREDNISolone (DEPO-MEDROL) 80 MG/ML injection 2 mLs (160 mg) by INTRA-ARTICULAR route once for 1 dose 2 mL 0     omeprazole (PRILOSEC) 20 MG DR capsule TAKE TWO CAPSULES BY MOUTH DAILY 180 capsule 2     triamcinolone (KENALOG) 0.1 % external cream apply topically twice a day as needed for a maximum of 14 days 80 g 1       Allergies   Allergen Reactions     Codeine Sulfate        REVIEW OF SYSTEMS:  CONSTITUTIONAL:  NEGATIVE for fever, chills, change in weight, not feeling tired  SKIN:  NEGATIVE for worrisome rashes, no skin lumps, no skin ulcers and no non-healing wounds  EYES:  NEGATIVE for vision changes or irritation.  ENT/MOUTH:  NEGATIVE.  No hearing loss, no hoarseness, no difficulty swallowing.  RESP:  NEGATIVE. No cough or shortness of breath.  CV:  NEGATIVE for chest pain, palpitations or peripheral edema  GI:  NEGATIVE for nausea, abdominal pain, heartburn, or change in bowel habits  :  Negative. No dysuria, no hematuria  MUSCULOSKELETAL:  See HPI above  NEURO:  NEGATIVE . No headaches, no dizziness,  no numbness  ENDOCRINE:  NEGATIVE for temperature intolerance, skin/hair changes  HEME/ALLERGY/IMMUNE:  NEGATIVE for bleeding problems  PSYCHIATRIC:  NEGATIVE. no anxiety, no depression.      Exam:  Vitals: /74   Pulse 69   Resp 17   Ht 1.651 m (5' 5\")   Wt 90.7 kg (200 lb)   SpO2 96%   BMI 33.28 kg/m    BMI= Body mass index is 33.28 kg/m .  Constitutional:  healthy, alert and no distress  Neuro: Alert and Oriented x 3, Sensation grossly WNL.  HEENT:  Atraumatic, EOMI  Neck:  Neck supple with no tenderness.  Psych: Affect normal   Respiratory: Breathing not labored.  Cardiovascular: normal peripheral pulses  Lymph: no adenopathy  Skin: No rashes,worrisome lesions or skin problems  Spine: straight, no straight leg raising pain.  Hips show full range of motion.  There is no tenderness over the sacro-iliac joints, sciatic notch, or greater " trochanters.

## 2019-06-02 NOTE — PROGRESS NOTES
Visit Date:   2019      HISTORY OF PRESENT ILLNESS:  This is a 74-year-old female seen in consultation at the request of Dr. Marlyn Arboleda for evaluation of bilateral knee pain.  She has had knee pain for the last 3-4 years.  Seemed to come on with time rather than injury.  Walking seems to make it worse.  She has sharp, dull, aching pains rated 7/10.  She has tried physical therapy without significant help.  She does not take anti-inflammatories.  She does have stomach trouble and is on Prilosec.  X-rays show moderate osteoarthritis of both knees, not quite bone on bone, but very close medially on both sides.      PHYSICAL EXAMINATION:  Show good motion from 0-120 degrees on both knees.  She does have medial joint tenderness on both knees.  There is no ligamentous laxity of MCL, LCL or cruciates.  She does not have effusions.  She does have patellofemoral crepitation with range of motion.  She does have medial pain with medial Alonso, but this is primarily mid joint.  No lateral pains.  There is no increased warmth or erythema.  Sensation and circulation are intact.      IMPRESSION:  Osteoarthritis, both knees.  We discussed options and will proceed with steroid injection.  Risks and benefits were discussed today.  We performed steroid injection with 80 mg Depo-Medrol and lidocaine in each knee through an anteromedial approach.  Return to clinic as needed.         RIYA MATHEW MD             D: 2019   T: 2019   MT: DERRICK      Name:     MEENAKSHI MARTÍNEZ   MRN:      [id number]        Account:      [id number]   :      1945           Visit Date:   2019      Document: [id number]

## 2019-06-26 ENCOUNTER — OFFICE VISIT (OUTPATIENT)
Dept: PODIATRY | Facility: CLINIC | Age: 74
End: 2019-06-26
Payer: COMMERCIAL

## 2019-06-26 ENCOUNTER — ANCILLARY PROCEDURE (OUTPATIENT)
Dept: GENERAL RADIOLOGY | Facility: CLINIC | Age: 74
End: 2019-06-26
Attending: PODIATRIST
Payer: COMMERCIAL

## 2019-06-26 VITALS
WEIGHT: 202 LBS | SYSTOLIC BLOOD PRESSURE: 144 MMHG | BODY MASS INDEX: 33.61 KG/M2 | DIASTOLIC BLOOD PRESSURE: 74 MMHG | HEART RATE: 68 BPM

## 2019-06-26 DIAGNOSIS — M20.41 HAMMERTOE OF RIGHT FOOT: Primary | ICD-10-CM

## 2019-06-26 DIAGNOSIS — R29.898 WEAKNESS OF BOTH LOWER EXTREMITIES: ICD-10-CM

## 2019-06-26 DIAGNOSIS — M20.41 HAMMERTOE OF RIGHT FOOT: ICD-10-CM

## 2019-06-26 PROCEDURE — 73630 X-RAY EXAM OF FOOT: CPT | Mod: RT

## 2019-06-26 PROCEDURE — 99203 OFFICE O/P NEW LOW 30 MIN: CPT | Performed by: PODIATRIST

## 2019-06-26 NOTE — LETTER
6/26/2019         RE: Negin Jonas  [address]  [facility] [address]        Dear Colleague,    Thank you for referring your patient, Negin Jonas, to the [facility]. Please see a copy of my visit note below.     Subjective:    Pt is seen today in consult from Dr. Arboleda with the c/c of painful right foot.  This has been symptomatic for the past 2 month(s).  Points to second toe as it is elevating and crossing onto the hallux and somewhat on the ball of the foot as well.  She has some pain on her fourth toe as well as it is sliding into third.  She denies any history of trauma.  Has pain w/ ambulation and shoewear and is relieved by rest and going barefoot.  Denies pain in the contralateral foot.  Describes as burning pain.  She quit smoking 15 years ago.  She also has knee arthritis.  She is retired.  She states she does feel some weakness in her legs when she is walking now.  She believes the right is a little bit weaker.      ROS:  A 10-point review of systems was performed and is positive for that noted in the HPI and noted above.  All other areas are negative.        Allergies   Allergen Reactions     Codeine Sulfate        Current Outpatient Medications   Medication Sig Dispense Refill     aspirin (ASPIRIN ADULT LOW STRENGTH) 81 MG EC tablet Take 1 tablet (81 mg) by mouth daily 90 tablet 3     citalopram (CELEXA) 20 MG tablet Take 1 tablet (20 mg) by mouth daily 90 tablet 3     fluticasone (FLONASE) 50 MCG/ACT nasal spray Spray 1-2 sprays into both nostrils daily For nasal allergies 16 g 3     lamoTRIgine (LAMICTAL) 200 MG tablet Take 1 tablet (200 mg) by mouth daily Reported on 2/15/2017 90 tablet 3     omeprazole (PRILOSEC) 20 MG DR capsule TAKE TWO CAPSULES BY MOUTH DAILY 180 capsule 2     triamcinolone (KENALOG) 0.1 % external cream apply topically twice a day as needed for a maximum of 14 days 80 g 1       Patient Active Problem List   Diagnosis      CARDIOVASCULAR SCREENING; LDL GOAL LESS THAN 160     Chronic rhinitis     Psoriasis     Short-term memory loss     Generalized anxiety disorder     Panic attacks     Advanced directives, counseling/discussion     Health Care Home     Bursitis of right knee     Traumatic brain injury (H)     Vertigo     Seizure disorder (H)     Recurrent major depressive disorder, in partial remission (H)     Adhesive capsulitis of both shoulders     Primary osteoarthritis of both knees       Past Medical History:   Diagnosis Date     Advanced directives, counseling/discussion 3/7/2012     Anxiety attack 2006    EEG normal, improved with lamictal     Chronic rhinitis 1/31/2011     Closed head injury 2004    subarachnoid hemorrage/cerebral contusion with sequellae of significant diminished short term memory, word finding difficulties and emotional lability, was in coma for 2 months, on seizure prophylaxsis with lamictal, f/b neuro annually     Generalized anxiety disorder      Major depression      Major depression in complete remission (H) 1/31/2011    was seen by psychiatrist in past, has tried Effexor and Seroquel and Hiram's Wart     Migraine headache     resolved s/p closed head injury     Panic attacks      Psoriasis 1/31/2011     Short-term memory loss 2004    secondary to closed head injury       Past Surgical History:   Procedure Laterality Date     ESOPHAGOSCOPY, GASTROSCOPY, DUODENOSCOPY (EGD), COMBINED N/A 12/17/2015    Procedure: COMBINED ESOPHAGOSCOPY, GASTROSCOPY, DUODENOSCOPY (EGD);  Surgeon: Duane, William Charles, MD;  Location: [facility] OR     ESOPHAGOSCOPY, GASTROSCOPY, DUODENOSCOPY (EGD), COMBINED N/A 12/17/2015    Procedure: COMBINED ESOPHAGOSCOPY, GASTROSCOPY, DUODENOSCOPY (EGD), BIOPSY SINGLE OR MULTIPLE;  Surgeon: Duane, William Charles, MD;  Location: [facility] OR     HC REMOVAL GALLBLADDER       HYSTERECTOMY, PAP NO LONGER INDICATED  1992    with ovaries     RELEASE CARPAL TUNNEL BILATERAL         Family History    Problem Relation Age of Onset     C.A.D. Father 70        CABG x 3      Respiratory Father         emphysema     Psychotic Disorder Father         depression     Diabetes Mother      Psychotic Disorder Mother         depression     Diabetes Maternal Grandfather      Hypertension Sister      Anxiety Disorder Sister      Respiratory Sister         emphysema       Social History     Tobacco Use     Smoking status: Former Smoker     Packs/day: 1.00     Years: 25.00     Pack years: 25.00     Smokeless tobacco: Never Used     Tobacco comment: Quit 15 years ago.   Substance Use Topics     Alcohol use: No       Objective:    O:  /74 (BP Location: Left arm)   Pulse 68   Wt 91.6 kg (202 lb)   BMI 33.61 kg/m   .      Constitutional/ general:  Pt is in no apparent distress, appears well-nourished.  Cooperative with history and physical exam.     Psych:  The patient answered questions appropriately.  Normal affect.  Seems to have reasonable expectations, in terms of treatment.     Eyes:  Visual scanning/ tracking without deficit.     Ears:  Response to auditory stimuli is normal.  negative hearing aid devices.  Auricles in proper alignment.     Lymphatic:  Popliteal lymph nodes not enlarged.     Lungs:  Non labored breathing, non labored speech. No cough.  No audible wheezing. Even, quiet breathing.       Vascular:  positive pedal pulses bilaterally for both the DP and PT arteries.  CFT < 3 sec.  positive ankle edema.  positive pedal hair growth.    Neuro:  Alert and oriented x 3. Coordinated gait.  Light touch sensation is intact to the L4, L5, S1 distributions. No evidence of neurological-based  spasticity, or contracture in the lower extremities.      Derm: Normal texture and turgor.  No erythema, ecchymosis, or cyanosis.      Musculoskeletal:    Patient is ambulatory without an assistive device or brace.  We note that when she is walking her right foot is slapping on the floor a little bit more.  Her muscle  compartments are somewhat weak bilaterally with the right slightly more so.  Her right second third and fourth toes are medially deviated.  Right second is more hammered and elevated.  It is somewhat difficult to reduce this toe.  There is some slight pain on the second metatarsal head.  There is none on any other met heads.    X-rays consistent with a forementioned deformities    Assessment:    Right hammertoe deformities with pain  Right lower extremity weakness    Plan:  Xray taken of foot.  Explained to patient how her altered gait causes more pressure on the ball of her foot.  Explained how the we can plantar ligament at the MTPJ can cause increased deformity.  She will try to manipulate these toes in the MTPJ 0 there is supple.  We gave her a Budin splint to help reduce her second toe.  We also gave her a toe .  We discussed wearing shoes that are stiff and good quality.  We also discussed some type of AFO if she feels that her right lower extremity is getting weaker.  She will call me if she would like to do this.  Return to clinic prn.  Thank you for allowing me participate in the care of this patient.        Lio Carver DPM, FACFAS        Again, thank you for allowing me to participate in the care of your patient.        Sincerely,        Lio Carver DPM

## 2019-06-26 NOTE — PROGRESS NOTES
Subjective:    Pt is seen today in consult from Dr. Arboleda with the c/c of painful right foot.  This has been symptomatic for the past 2 month(s).  Points to second toe as it is elevating and crossing onto the hallux and somewhat on the ball of the foot as well.  She has some pain on her fourth toe as well as it is sliding into third.  She denies any history of trauma.  Has pain w/ ambulation and shoewear and is relieved by rest and going barefoot.  Denies pain in the contralateral foot.  Describes as burning pain.  She quit smoking 15 years ago.  She also has knee arthritis.  She is retired.  She states she does feel some weakness in her legs when she is walking now.  She believes the right is a little bit weaker.      ROS:  A 10-point review of systems was performed and is positive for that noted in the HPI and noted above.  All other areas are negative.        Allergies   Allergen Reactions     Codeine Sulfate        Current Outpatient Medications   Medication Sig Dispense Refill     aspirin (ASPIRIN ADULT LOW STRENGTH) 81 MG EC tablet Take 1 tablet (81 mg) by mouth daily 90 tablet 3     citalopram (CELEXA) 20 MG tablet Take 1 tablet (20 mg) by mouth daily 90 tablet 3     fluticasone (FLONASE) 50 MCG/ACT nasal spray Spray 1-2 sprays into both nostrils daily For nasal allergies 16 g 3     lamoTRIgine (LAMICTAL) 200 MG tablet Take 1 tablet (200 mg) by mouth daily Reported on 2/15/2017 90 tablet 3     omeprazole (PRILOSEC) 20 MG DR capsule TAKE TWO CAPSULES BY MOUTH DAILY 180 capsule 2     triamcinolone (KENALOG) 0.1 % external cream apply topically twice a day as needed for a maximum of 14 days 80 g 1       Patient Active Problem List   Diagnosis     CARDIOVASCULAR SCREENING; LDL GOAL LESS THAN 160     Chronic rhinitis     Psoriasis     Short-term memory loss     Generalized anxiety disorder     Panic attacks     Advanced directives, counseling/discussion     Health Care Home     Bursitis of right knee      Traumatic brain injury (H)     Vertigo     Seizure disorder (H)     Recurrent major depressive disorder, in partial remission (H)     Adhesive capsulitis of both shoulders     Primary osteoarthritis of both knees       Past Medical History:   Diagnosis Date     Advanced directives, counseling/discussion 3/7/2012     Anxiety attack 2006    EEG normal, improved with lamictal     Chronic rhinitis 1/31/2011     Closed head injury 2004    subarachnoid hemorrage/cerebral contusion with sequellae of significant diminished short term memory, word finding difficulties and emotional lability, was in coma for 2 months, on seizure prophylaxsis with lamictal, f/b neuro annually     Generalized anxiety disorder      Major depression      Major depression in complete remission (H) 1/31/2011    was seen by psychiatrist in past, has tried Effexor and Seroquel and Hiram's Wart     Migraine headache     resolved s/p closed head injury     Panic attacks      Psoriasis 1/31/2011     Short-term memory loss 2004    secondary to closed head injury       Past Surgical History:   Procedure Laterality Date     ESOPHAGOSCOPY, GASTROSCOPY, DUODENOSCOPY (EGD), COMBINED N/A 12/17/2015    Procedure: COMBINED ESOPHAGOSCOPY, GASTROSCOPY, DUODENOSCOPY (EGD);  Surgeon: Duane, William Charles, MD;  Location: [facility] OR     ESOPHAGOSCOPY, GASTROSCOPY, DUODENOSCOPY (EGD), COMBINED N/A 12/17/2015    Procedure: COMBINED ESOPHAGOSCOPY, GASTROSCOPY, DUODENOSCOPY (EGD), BIOPSY SINGLE OR MULTIPLE;  Surgeon: Duane, William Charles, MD;  Location: [facility] OR     HC REMOVAL GALLBLADDER       HYSTERECTOMY, PAP NO LONGER INDICATED  1992    with ovaries     RELEASE CARPAL TUNNEL BILATERAL         Family History   Problem Relation Age of Onset     C.A.D. Father 70        CABG x 3      Respiratory Father         emphysema     Psychotic Disorder Father         depression     Diabetes Mother      Psychotic Disorder Mother         depression     Diabetes Maternal Grandfather       Hypertension Sister      Anxiety Disorder Sister      Respiratory Sister         emphysema       Social History     Tobacco Use     Smoking status: Former Smoker     Packs/day: 1.00     Years: 25.00     Pack years: 25.00     Smokeless tobacco: Never Used     Tobacco comment: Quit 15 years ago.   Substance Use Topics     Alcohol use: No       Objective:    O:  /74 (BP Location: Left arm)   Pulse 68   Wt 91.6 kg (202 lb)   BMI 33.61 kg/m  .      Constitutional/ general:  Pt is in no apparent distress, appears well-nourished.  Cooperative with history and physical exam.     Psych:  The patient answered questions appropriately.  Normal affect.  Seems to have reasonable expectations, in terms of treatment.     Eyes:  Visual scanning/ tracking without deficit.     Ears:  Response to auditory stimuli is normal.  negative hearing aid devices.  Auricles in proper alignment.     Lymphatic:  Popliteal lymph nodes not enlarged.     Lungs:  Non labored breathing, non labored speech. No cough.  No audible wheezing. Even, quiet breathing.       Vascular:  positive pedal pulses bilaterally for both the DP and PT arteries.  CFT < 3 sec.  positive ankle edema.  positive pedal hair growth.    Neuro:  Alert and oriented x 3. Coordinated gait.  Light touch sensation is intact to the L4, L5, S1 distributions. No evidence of neurological-based  spasticity, or contracture in the lower extremities.      Derm: Normal texture and turgor.  No erythema, ecchymosis, or cyanosis.      Musculoskeletal:    Patient is ambulatory without an assistive device or brace.  We note that when she is walking her right foot is slapping on the floor a little bit more.  Her muscle compartments are somewhat weak bilaterally with the right slightly more so.  Her right second third and fourth toes are medially deviated.  Right second is more hammered and elevated.  It is somewhat difficult to reduce this toe.  There is some slight pain on the second  metatarsal head.  There is none on any other met heads.    X-rays consistent with a forementioned deformities    Assessment:    Right hammertoe deformities with pain  Right lower extremity weakness    Plan:  Xray taken of foot.  Explained to patient how her altered gait causes more pressure on the ball of her foot.  Explained how the we can plantar ligament at the MTPJ can cause increased deformity.  She will try to manipulate these toes in the MTPJ 0 there is supple.  We gave her a Budin splint to help reduce her second toe.  We also gave her a toe .  We discussed wearing shoes that are stiff and good quality.  We also discussed some type of AFO if she feels that her right lower extremity is getting weaker.  She will call me if she would like to do this.  Return to clinic prn.  Thank you for allowing me participate in the care of this patient.        Lio Carver DPM, FACFAS

## 2019-07-09 DIAGNOSIS — M20.41 OTHER HAMMER TOE(S) (ACQUIRED), RIGHT FOOT: ICD-10-CM

## 2019-07-09 DIAGNOSIS — M21.371 FOOT DROP, RIGHT FOOT: Primary | ICD-10-CM

## 2019-09-10 ENCOUNTER — OFFICE VISIT (OUTPATIENT)
Dept: ORTHOPEDICS | Facility: CLINIC | Age: 74
End: 2019-09-10
Payer: COMMERCIAL

## 2019-09-10 VITALS
BODY MASS INDEX: 33.32 KG/M2 | DIASTOLIC BLOOD PRESSURE: 73 MMHG | RESPIRATION RATE: 16 BRPM | WEIGHT: 200 LBS | SYSTOLIC BLOOD PRESSURE: 147 MMHG | HEART RATE: 77 BPM | HEIGHT: 65 IN

## 2019-09-10 DIAGNOSIS — M17.0 PRIMARY OSTEOARTHRITIS OF BOTH KNEES: Primary | ICD-10-CM

## 2019-09-10 PROCEDURE — 20610 DRAIN/INJ JOINT/BURSA W/O US: CPT | Mod: 50 | Performed by: ORTHOPAEDIC SURGERY

## 2019-09-10 RX ORDER — METHYLPREDNISOLONE ACETATE 80 MG/ML
160 INJECTION, SUSPENSION INTRA-ARTICULAR; INTRALESIONAL; INTRAMUSCULAR; SOFT TISSUE ONCE
Qty: 2 ML | Refills: 0 | OUTPATIENT
Start: 2019-09-10 | End: 2019-09-10

## 2019-09-10 ASSESSMENT — MIFFLIN-ST. JEOR: SCORE: 1418.07

## 2019-09-10 NOTE — LETTER
9/10/2019         RE: Negin Jonas  [address]  [facility] [address]        Dear Colleague,    Thank you for referring your patient, Negin Jonas, to the [facility]. Please see a copy of my visit note below.    The patient's bilateral knee was prepped with betadine solution after verification of allergies. Area approximately 10 cm x 10 cm prepped in a sterile fashion. After injection, betadine removed with soap and water and band-aids applied.    2ml depo medrol with 1% lidocaine plain injected into patient's bilateral knee by Dr. Hamilton Martinez  LOT# [id number]  Exp. 1/20      Follow up bilateral knee primary osteoarthritis.  Has moderate osteoarthritis by x-ray.  Last injection 5/28/19.  Range of motion 0-120.    She  desires injection today of bilateral knee(s).  Risks, benefits, potential complications and alternatives were discussed.   With the patient's consent, sterile prep was performed of bilateral knee(s).  Each knee was injected with Depo Medrol 80 mg and lidocaine at anteromedial site.  Return to clinic as needed.         Again, thank you for allowing me to participate in the care of your patient.        Sincerely,        Hamilton Martinez MD

## 2019-09-10 NOTE — PROGRESS NOTES
Follow up bilateral knee primary osteoarthritis.  Has moderate osteoarthritis by x-ray.  Last injection 5/28/19.  Range of motion 0-120.    She  desires injection today of bilateral knee(s).  Risks, benefits, potential complications and alternatives were discussed.   With the patient's consent, sterile prep was performed of bilateral knee(s).  Each knee was injected with Depo Medrol 80 mg and lidocaine at anteromedial site.  Return to clinic as needed.

## 2019-09-10 NOTE — PROGRESS NOTES
The patient's bilateral knee was prepped with betadine solution after verification of allergies. Area approximately 10 cm x 10 cm prepped in a sterile fashion. After injection, betadine removed with soap and water and band-aids applied.    2ml depo medrol with 1% lidocaine plain injected into patient's bilateral knee by Dr. Hamilton Martinez  LOT# [id number]  Exp. 1/20

## 2019-11-06 NOTE — PATIENT INSTRUCTIONS
We wish you continued good healing. If you have any questions or concerns, please do not hesitate to contact us at [phone]    Please remember to call and schedule a follow up appointment if one was recommended at your earliest convenience.   PODIATRY CLINIC HOURS  TELEPHONE NUMBER    Dr. Lio Carver D.P.[facility]    Clinics:  [facility]    Sena Childress [facility]   Tuesday 1PM-6PM  [place]  Wednesday 7AM-2PM  [facility]  Thursday 10AM-6PM  [place] Friday 7AM-3PM  [place]  Specialty schedulers:   [phone] to make an appointment with any Specialty Provider.        Urgent Care locations:    [facility] Monday-Friday 5 pm - 9 pm. Saturday-Sunday 9 am -5pm    Monday-Friday 11 am - 9 pm Saturday 9 am - 5 pm     Monday-Sunday 12 noon-8PM [phone]     If you need a medication refill, please contact us you may need lab work and/or a follow up visit prior to your refill (i.e. Antifungal medications).    [address] [facility] (secure e-mail communication and access to your chart) to send a message or to make an appointment.    If MRI needed please call Bj Fuller at [phone]        Negin to follow up with Primary Care provider regarding elevated blood pressure.    We wish you continued good healing. If you have any questions or concerns, please do not hesitate to contact us at [phone]    Please remember to call and schedule a follow up appointment if one was recommended at your earliest convenience.   PODIATRY CLINIC HOURS  TELEPHONE NUMBER    Dr. Lio Carver D.P[facility]    Clinics:  [facility]  [facility]   Tuesday 1PM-6PM  [place]  Wednesday 7AM-2PM  [facility]  Thursday 10AM-6PM  [place] Friday 7AM-3PM  [place]  Specialty schedulers:   [phone] to make an appointment with  any Specialty Provider.        Urgent Care locations:    [facility] Monday-Friday 5 pm - 9 pm. Saturday-Sunday 9 am -5pm    Monday-Friday 11 am - 9 pm Saturday 9 am - 5 pm     Monday-Sunday 12 noon-8PM [phone]     If you need a medication refill, please contact us you may need lab work and/or a follow up visit prior to your refill (i.e. Antifungal medications).    [facility] (secure e-mail communication and access to your chart) to send a message or to make an appointment.    If MRI needed please call Bj Fuller at [phone]        Weight management plan: Patient was referred to their PCP to discuss a diet and exercise plan.     Negin to follow up with Primary Care provider regarding elevated blood pressure.       being tested for sleep apnea

## 2019-12-03 ENCOUNTER — OFFICE VISIT (OUTPATIENT)
Dept: ORTHOPEDICS | Facility: CLINIC | Age: 74
End: 2019-12-03
Payer: COMMERCIAL

## 2019-12-03 ENCOUNTER — TRANSFERRED RECORDS (OUTPATIENT)
Dept: HEALTH INFORMATION MANAGEMENT | Facility: CLINIC | Age: 74
End: 2019-12-03

## 2019-12-03 VITALS
SYSTOLIC BLOOD PRESSURE: 164 MMHG | HEIGHT: 65 IN | OXYGEN SATURATION: 98 % | BODY MASS INDEX: 33.32 KG/M2 | WEIGHT: 200 LBS | HEART RATE: 81 BPM | DIASTOLIC BLOOD PRESSURE: 78 MMHG | RESPIRATION RATE: 16 BRPM

## 2019-12-03 DIAGNOSIS — M17.0 PRIMARY OSTEOARTHRITIS OF BOTH KNEES: Primary | ICD-10-CM

## 2019-12-03 PROCEDURE — 20610 DRAIN/INJ JOINT/BURSA W/O US: CPT | Mod: 50 | Performed by: ORTHOPAEDIC SURGERY

## 2019-12-03 RX ORDER — METHYLPREDNISOLONE ACETATE 80 MG/ML
80 INJECTION, SUSPENSION INTRA-ARTICULAR; INTRALESIONAL; INTRAMUSCULAR; SOFT TISSUE
Status: DISCONTINUED | OUTPATIENT
Start: 2019-12-03 | End: 2021-03-11

## 2019-12-03 RX ORDER — LIDOCAINE HYDROCHLORIDE 10 MG/ML
5 INJECTION, SOLUTION INFILTRATION; PERINEURAL
Status: DISCONTINUED | OUTPATIENT
Start: 2019-12-03 | End: 2021-03-11

## 2019-12-03 RX ADMIN — LIDOCAINE HYDROCHLORIDE 5 ML: 10 INJECTION, SOLUTION INFILTRATION; PERINEURAL at 08:18

## 2019-12-03 RX ADMIN — METHYLPREDNISOLONE ACETATE 80 MG: 80 INJECTION, SUSPENSION INTRA-ARTICULAR; INTRALESIONAL; INTRAMUSCULAR; SOFT TISSUE at 08:18

## 2019-12-03 ASSESSMENT — MIFFLIN-ST. JEOR: SCORE: 1418.07

## 2019-12-03 NOTE — PATIENT INSTRUCTIONS
Negin to follow up with Primary Care provider regarding elevated blood pressure.      Aleve up to 4 tablets per day or ibuprofen up to 12 tablets per day.    You have had a steroid injection today.  For the first 2 hours there will likely be some numbing in the joint from the lidocaine.  This is a good sign, indicating that the injection is in the right place.  In 2 hours the lidocaine will wear off, and the joint will hurt like you had a shot.  Each day the cortisone makes it feel better.  It reaches peak effect in 2 weeks.  We expect it to last for 3 months.  You may resume regular activity when you feel ready.  If you are diabetic, your glucoses will be quite high for several days.

## 2019-12-03 NOTE — LETTER
12/3/2019         RE: Negni Jonas  [address]  [place] [facility] [address]        Dear Colleague,    Thank you for referring your patient, Negin Jnoas, to the [facility]. Please see a copy of my visit note below.            Large Joint Injection/Arthocentesis: bilateral knee  Date/Time: 12/3/2019 8:18 AM  Performed by: Hamilton Martinez MD  Authorized by: Hamilton Martinez MD     Indications:  Pain and osteoarthritis  Needle Size:  22 G  Guidance: landmark guided    Location:  Knee  Laterality:  Bilateral      Medications (Right):  80 mg methylPREDNISolone 80 MG/ML; 5 mL lidocaine 1 %  Medications (Left):  80 mg methylPREDNISolone 80 MG/ML; 5 mL lidocaine 1 %  Outcome:  Tolerated well, no immediate complications  Procedure discussed: discussed risks, benefits, and alternatives    Consent Given by:  Patient  Timeout: timeout called immediately prior to procedure    Prep: patient was prepped and draped in usual sterile fashion    Prep comment:  Betadine          Follow up bilateral knee primary osteoarthritis.  Has moderate osteoarthritis by x-ray.  Last injection 9/10/19.  Range of motion 0-120.    She  desires injection today of bilateral knee(s).  Risks, benefits, potential complications and alternatives were discussed.   With the patient's consent, sterile prep was performed of bilateral knee(s).  Each knee was injected with Depo Medrol 80 mg and lidocaine at anteromedial site.  Return to clinic as needed.         Again, thank you for allowing me to participate in the care of your patient.        Sincerely,        Hamilton Martinez MD

## 2019-12-03 NOTE — PROGRESS NOTES
Large Joint Injection/Arthocentesis: bilateral knee  Date/Time: 12/3/2019 8:18 AM  Performed by: Hamilton Martinez MD  Authorized by: Hamilton Martinez MD     Indications:  Pain and osteoarthritis  Needle Size:  22 G  Guidance: landmark guided    Location:  Knee  Laterality:  Bilateral      Medications (Right):  80 mg methylPREDNISolone 80 MG/ML; 5 mL lidocaine 1 %  Medications (Left):  80 mg methylPREDNISolone 80 MG/ML; 5 mL lidocaine 1 %  Outcome:  Tolerated well, no immediate complications  Procedure discussed: discussed risks, benefits, and alternatives    Consent Given by:  Patient  Timeout: timeout called immediately prior to procedure    Prep: patient was prepped and draped in usual sterile fashion    Prep comment:  Betadine

## 2019-12-03 NOTE — PROGRESS NOTES
Follow up bilateral knee primary osteoarthritis.  Has moderate osteoarthritis by x-ray.  Last injection 9/10/19.  Range of motion 0-120.    She  desires injection today of bilateral knee(s).  Risks, benefits, potential complications and alternatives were discussed.   With the patient's consent, sterile prep was performed of bilateral knee(s).  Each knee was injected with Depo Medrol 80 mg and lidocaine at anteromedial site.  Return to clinic as needed.

## 2020-01-06 ENCOUNTER — OFFICE VISIT (OUTPATIENT)
Dept: FAMILY MEDICINE | Facility: CLINIC | Age: 75
End: 2020-01-06
Payer: COMMERCIAL

## 2020-01-06 VITALS
TEMPERATURE: 98 F | RESPIRATION RATE: 16 BRPM | WEIGHT: 196 LBS | HEIGHT: 65 IN | OXYGEN SATURATION: 95 % | SYSTOLIC BLOOD PRESSURE: 140 MMHG | BODY MASS INDEX: 32.65 KG/M2 | DIASTOLIC BLOOD PRESSURE: 84 MMHG | HEART RATE: 82 BPM

## 2020-01-06 DIAGNOSIS — R03.0 ELEVATED BLOOD PRESSURE READING WITHOUT DIAGNOSIS OF HYPERTENSION: ICD-10-CM

## 2020-01-06 DIAGNOSIS — M89.9 BONE DISORDER: ICD-10-CM

## 2020-01-06 DIAGNOSIS — Z13.220 LIPID SCREENING: ICD-10-CM

## 2020-01-06 DIAGNOSIS — K21.9 GASTROESOPHAGEAL REFLUX DISEASE WITHOUT ESOPHAGITIS: ICD-10-CM

## 2020-01-06 DIAGNOSIS — S06.9X9S TRAUMATIC BRAIN INJURY WITH LOSS OF CONSCIOUSNESS, SEQUELA (H): ICD-10-CM

## 2020-01-06 DIAGNOSIS — G40.909 SEIZURE DISORDER (H): Primary | ICD-10-CM

## 2020-01-06 DIAGNOSIS — F41.1 GENERALIZED ANXIETY DISORDER: ICD-10-CM

## 2020-01-06 DIAGNOSIS — F33.41 RECURRENT MAJOR DEPRESSIVE DISORDER, IN PARTIAL REMISSION (H): ICD-10-CM

## 2020-01-06 DIAGNOSIS — Z12.31 VISIT FOR SCREENING MAMMOGRAM: ICD-10-CM

## 2020-01-06 DIAGNOSIS — J31.0 CHRONIC RHINITIS: ICD-10-CM

## 2020-01-06 LAB
ALBUMIN SERPL-MCNC: 4 G/DL (ref 3.4–5)
ALP SERPL-CCNC: 87 U/L (ref 40–150)
ALT SERPL W P-5'-P-CCNC: 24 U/L (ref 0–50)
ANION GAP SERPL CALCULATED.3IONS-SCNC: 7 MMOL/L (ref 3–14)
AST SERPL W P-5'-P-CCNC: 21 U/L (ref 0–45)
BILIRUB SERPL-MCNC: 0.4 MG/DL (ref 0.2–1.3)
BUN SERPL-MCNC: 24 MG/DL (ref 7–30)
CALCIUM SERPL-MCNC: 9.8 MG/DL (ref 8.5–10.1)
CHLORIDE SERPL-SCNC: 108 MMOL/L (ref 94–109)
CHOLEST SERPL-MCNC: 206 MG/DL
CO2 SERPL-SCNC: 26 MMOL/L (ref 20–32)
CREAT SERPL-MCNC: 0.73 MG/DL (ref 0.52–1.04)
ERYTHROCYTE [DISTWIDTH] IN BLOOD BY AUTOMATED COUNT: 13.3 % (ref 10–15)
GFR SERPL CREATININE-BSD FRML MDRD: 82 ML/MIN/{1.73_M2}
GLUCOSE SERPL-MCNC: 113 MG/DL (ref 70–99)
HCT VFR BLD AUTO: 40.1 % (ref 35–47)
HDLC SERPL-MCNC: 81 MG/DL
HGB BLD-MCNC: 13.6 G/DL (ref 11.7–15.7)
LDLC SERPL CALC-MCNC: 92 MG/DL
MCH RBC QN AUTO: 30.2 PG (ref 26.5–33)
MCHC RBC AUTO-ENTMCNC: 33.9 G/DL (ref 31.5–36.5)
MCV RBC AUTO: 89 FL (ref 78–100)
NONHDLC SERPL-MCNC: 125 MG/DL
PLATELET # BLD AUTO: 237 10E9/L (ref 150–450)
POTASSIUM SERPL-SCNC: 3.9 MMOL/L (ref 3.4–5.3)
PROT SERPL-MCNC: 7.2 G/DL (ref 6.8–8.8)
RBC # BLD AUTO: 4.5 10E12/L (ref 3.8–5.2)
SODIUM SERPL-SCNC: 141 MMOL/L (ref 133–144)
TRIGL SERPL-MCNC: 164 MG/DL
WBC # BLD AUTO: 6.2 10E9/L (ref 4–11)

## 2020-01-06 PROCEDURE — 80061 LIPID PANEL: CPT | Performed by: FAMILY MEDICINE

## 2020-01-06 PROCEDURE — 36415 COLL VENOUS BLD VENIPUNCTURE: CPT | Performed by: FAMILY MEDICINE

## 2020-01-06 PROCEDURE — 85027 COMPLETE CBC AUTOMATED: CPT | Performed by: FAMILY MEDICINE

## 2020-01-06 PROCEDURE — 99214 OFFICE O/P EST MOD 30 MIN: CPT | Performed by: FAMILY MEDICINE

## 2020-01-06 PROCEDURE — 80053 COMPREHEN METABOLIC PANEL: CPT | Performed by: FAMILY MEDICINE

## 2020-01-06 RX ORDER — LAMOTRIGINE 200 MG/1
200 TABLET ORAL DAILY
Qty: 90 TABLET | Refills: 3
Start: 2020-01-06 | End: 2021-07-09

## 2020-01-06 RX ORDER — CITALOPRAM HYDROBROMIDE 20 MG/1
20 TABLET ORAL DAILY
Qty: 90 TABLET | Refills: 3 | Status: SHIPPED | OUTPATIENT
Start: 2020-01-06 | End: 2021-02-22

## 2020-01-06 RX ORDER — FLUTICASONE PROPIONATE 50 MCG
1-2 SPRAY, SUSPENSION (ML) NASAL DAILY
Qty: 16 G | Refills: 3 | Status: SHIPPED | OUTPATIENT
Start: 2020-01-06 | End: 2020-08-20

## 2020-01-06 ASSESSMENT — MIFFLIN-ST. JEOR: SCORE: 1399.93

## 2020-01-06 ASSESSMENT — PAIN SCALES - GENERAL: PAINLEVEL: NO PAIN (0)

## 2020-01-06 NOTE — PROGRESS NOTES
Subjective     Negin Jonas is a 74 year old female who presents to clinic today for the following health issues:    HPI   Depression and Anxiety Follow-Up    How are you doing with your depression since your last visit? No change    How are you doing with your anxiety since your last visit?  No change    Are you having other symptoms that might be associated with depression or anxiety? No    Have you had a significant life event? No     Do you have any concerns with your use of alcohol or other drugs? No    Social History     Tobacco Use     Smoking status: Former Smoker     Packs/day: 1.00     Years: 25.00     Pack years: 25.00     Smokeless tobacco: Never Used     Tobacco comment: Quit 15 years ago.   Substance Use Topics     Alcohol use: No     Drug use: No     PHQ 11/13/2017 9/7/2018 5/9/2019   PHQ-9 Total Score 1 9 9   Q9: Thoughts of better off dead/self-harm past 2 weeks Not at all Not at all Not at all     EWA-7 SCORE 8/31/2015 7/27/2016 5/9/2019   Total Score - - -   Total Score 6 12 4           Suicide Assessment Five-step Evaluation and Treatment (SAFE-T)      How many days per week do you miss taking your medication? 0    Medication Followup of Flonase     Taking Medication as prescribed: yes    Side Effects:  None    Medication Helping Symptoms:  yes     Seizure disorder and history of traumatic brain injury stable on current medications. Follow up with neurology yearly with medication refills.     Patient Active Problem List   Diagnosis     CARDIOVASCULAR SCREENING; LDL GOAL LESS THAN 160     Chronic rhinitis     Psoriasis     Short-term memory loss     Generalized anxiety disorder     Panic attacks     Advanced directives, counseling/discussion     Health Care Home     Bursitis of right knee     Traumatic brain injury (H)     Vertigo     Seizure disorder (H)     Recurrent major depressive disorder, in partial remission (H)     Adhesive capsulitis of both shoulders     Primary osteoarthritis of  both knees     Past Surgical History:   Procedure Laterality Date     ESOPHAGOSCOPY, GASTROSCOPY, DUODENOSCOPY (EGD), COMBINED N/A 12/17/2015    Procedure: COMBINED ESOPHAGOSCOPY, GASTROSCOPY, DUODENOSCOPY (EGD);  Surgeon: Duane, William Charles, MD;  Location: [facility]     ESOPHAGOSCOPY, GASTROSCOPY, DUODENOSCOPY (EGD), COMBINED N/A 12/17/2015    Procedure: COMBINED ESOPHAGOSCOPY, GASTROSCOPY, DUODENOSCOPY (EGD), BIOPSY SINGLE OR MULTIPLE;  Surgeon: Duane, William Charles, MD;  Location: [facility] OR     HC REMOVAL GALLBLADDER       HYSTERECTOMY, PAP NO LONGER INDICATED  1992    with ovaries     RELEASE CARPAL TUNNEL BILATERAL         Social History     Tobacco Use     Smoking status: Former Smoker     Packs/day: 1.00     Years: 25.00     Pack years: 25.00     Smokeless tobacco: Never Used     Tobacco comment: Quit 15 years ago.   Substance Use Topics     Alcohol use: No     Family History   Problem Relation Age of Onset     C.A.D. Father 70        CABG x 3      Respiratory Father         emphysema     Psychotic Disorder Father         depression     Diabetes Mother      Psychotic Disorder Mother         depression     Diabetes Maternal Grandfather      Hypertension Sister      Anxiety Disorder Sister      Respiratory Sister         emphysema         Current Outpatient Medications   Medication Sig Dispense Refill     aspirin (ASPIRIN ADULT LOW STRENGTH) 81 MG EC tablet Take 1 tablet (81 mg) by mouth daily 90 tablet 3     citalopram (CELEXA) 20 MG tablet Take 1 tablet (20 mg) by mouth daily 90 tablet 3     fluticasone (FLONASE) 50 MCG/ACT nasal spray Spray 1-2 sprays into both nostrils daily For nasal allergies 16 g 3     lamoTRIgine (LAMICTAL) 200 MG tablet Take 1 tablet (200 mg) by mouth daily Reported on 2/15/2017 90 tablet 3     omeprazole (PRILOSEC) 20 MG DR capsule Take 2 capsules (40 mg) by mouth daily 180 capsule 2     triamcinolone (KENALOG) 0.1 % external cream apply topically twice a day as needed for a maximum of  "14 days 80 g 1     Allergies   Allergen Reactions     Codeine Sulfate      Recent Labs   Lab Test 01/06/20  1457 09/07/18  1440 11/24/17  0800 07/28/16  0813   LDL 92  --  100* 141*   HDL 81  --  98 79   TRIG 164*  --  62 89   ALT 24 22 22 25   CR 0.73 0.71 0.70 0.76   GFRESTIMATED 82 81 83 76   GFRESTBLACK >90 >90 >90 >90  African American GFR Calc     POTASSIUM 3.9 4.8 4.1 4.2   TSH  --  2.90  --   --       BP Readings from Last 3 Encounters:   01/06/20 (!) 140/84   12/03/19 (!) 164/78   09/10/19 (!) 147/73    Wt Readings from Last 3 Encounters:   01/06/20 88.9 kg (196 lb)   12/03/19 90.7 kg (200 lb)   09/10/19 90.7 kg (200 lb)                      Reviewed and updated as needed this visit by Provider         Review of Systems   ROS COMP: Constitutional, HEENT, cardiovascular, pulmonary, gi and gu systems are negative, except as otherwise noted.      Objective    BP (!) 140/84 (BP Location: Right arm, Patient Position: Chair, Cuff Size: Adult Regular)   Pulse 82   Temp 98  F (36.7  C) (Oral)   Resp 16   Ht 1.651 m (5' 5\")   Wt 88.9 kg (196 lb)   SpO2 95%   BMI 32.62 kg/m    Body mass index is 32.62 kg/m .  Physical Exam   GENERAL: healthy, alert, well nourished, well hydrated, no distress, obese  HENT: ear canals- normal; TMs- normal; Nose- normal; Mouth- no ulcers, no lesions, throat is clear with no erythema or exudate.   NECK: no tenderness, no adenopathy, no asymmetry, no masses, no stiffness; thyroid- normal to palpation  RESP: lungs clear to auscultation - no rales, no rhonchi, no wheezes  CV: regular rates and rhythm, normal S1 S2, no S3 or S4 and no murmur, no click or rub -  ABDOMEN: soft, no tenderness, no  hepatosplenomegaly, no masses, normal bowel sounds  MS: extremities- no gross deformities noted, no edema  SKIN: no suspicious lesions, no rashes, numerous tattoos.   NEURO: strength and tone- normal, sensory exam- grossly normal, mentation- intact, speech- normal, reflexes- symmetric  BACK: no " CVA tenderness, no paralumbar tenderness  PSYCH: Alert and oriented times 3; speech- coherent , normal rate and volume; able to articulate logical thoughts, able to abstract reason, no tangential thoughts, no hallucinations or delusions, affect- normal     Diagnostic Test Results:  Labs reviewed in Epic  Results for orders placed or performed in visit on 01/06/20 (from the past 24 hour(s))   Comprehensive metabolic panel   Result Value Ref Range    Sodium 141 133 - 144 mmol/L    Potassium 3.9 3.4 - 5.3 mmol/L    Chloride 108 94 - 109 mmol/L    Carbon Dioxide 26 20 - 32 mmol/L    Anion Gap 7 3 - 14 mmol/L    Glucose 113 (H) 70 - 99 mg/dL    Urea Nitrogen 24 7 - 30 mg/dL    Creatinine 0.73 0.52 - 1.04 mg/dL    GFR Estimate 82 >60 mL/min/[1.73_m2]    GFR Estimate If Black >90 >60 mL/min/[1.73_m2]    Calcium 9.8 8.5 - 10.1 mg/dL    Bilirubin Total 0.4 0.2 - 1.3 mg/dL    Albumin 4.0 3.4 - 5.0 g/dL    Protein Total 7.2 6.8 - 8.8 g/dL    Alkaline Phosphatase 87 40 - 150 U/L    ALT 24 0 - 50 U/L    AST 21 0 - 45 U/L   Lipid panel reflex to direct LDL Non-fasting   Result Value Ref Range    Cholesterol 206 (H) <200 mg/dL    Triglycerides 164 (H) <150 mg/dL    HDL Cholesterol 81 >49 mg/dL    LDL Cholesterol Calculated 92 <100 mg/dL    Non HDL Cholesterol 125 <130 mg/dL   CBC with platelets   Result Value Ref Range    WBC 6.2 4.0 - 11.0 10e9/L    RBC Count 4.50 3.8 - 5.2 10e12/L    Hemoglobin 13.6 11.7 - 15.7 g/dL    Hematocrit 40.1 35.0 - 47.0 %    MCV 89 78 - 100 fl    MCH 30.2 26.5 - 33.0 pg    MCHC 33.9 31.5 - 36.5 g/dL    RDW 13.3 10.0 - 15.0 %    Platelet Count 237 150 - 450 10e9/L           Assessment & Plan       ICD-10-CM    1. Seizure disorder (H)- labs stable and no recent seizures G40.909 lamoTRIgine (LAMICTAL) 200 MG tablet     Comprehensive metabolic panel     CBC with platelets   2. Traumatic brain injury with loss of consciousness, sequela (H) S06.9X9S Some history of behavior issues but doing well and .  "   3. Generalized anxiety disorder F41.1 citalopram (CELEXA) 20 MG tablet- continue current medications    4. Recurrent major depressive disorder, in partial remission (H)- stable  F33.41 citalopram (CELEXA) 20 MG tablet   5. Chronic rhinitis J31.0 fluticasone (FLONASE) 50 MCG/ACT nasal spray as needed    6. Gastroesophageal reflux disease without esophagitis K21.9 omeprazole (PRILOSEC) 20 MG DR capsule- use as needed   7. Bone disorder- needs follow up DEXA M89.9 DEXA HIP/PELVIS/SPINE - Future   8. Visit for screening mammogram Z12.31 MA SCREENING DIGITAL BILAT - Future  (s+30)   9. Lipid screening Z13.220 Lipid panel reflex to direct LDL Non-fasting   10. Elevated blood pressure reading without diagnosis of hypertension R03.0 Recheck blood pressure in 1-2 months.         BMI:   Estimated body mass index is 32.62 kg/m  as calculated from the following:    Height as of this encounter: 1.651 m (5' 5\").    Weight as of this encounter: 88.9 kg (196 lb).   Weight management plan: Discussed healthy diet and exercise guidelines        CONSULTATION/REFERRAL to neurology as scheduled. Recommend Shingrix vaccine.   FUTURE LABS:       - Schedule fasting labs in a year, then see provider  FUTURE APPOINTMENTS:       - Follow-up for annual visit or as needed  Work on weight loss  Regular exercise  See Patient Instructions    No follow-ups on file.    Marlyn Arboleda MD  [facility]        "

## 2020-01-06 NOTE — PATIENT INSTRUCTIONS
At [facility], we strive to deliver an exceptional experience to you, every time we see you.  If you receive a survey in the mail, please send us back your thoughts. We really do value your feedback.    Based on your medical history, these are the current health maintenance/preventive care services that you are due for (some may have been done at this visit.)  Health Maintenance Due   Topic Date Due     ZOSTER IMMUNIZATION (2 of 3) 06/06/2014     ADVANCE CARE PLANNING  03/07/2017     DEXA  10/08/2019     PHQ-9  11/09/2019     MAMMO SCREENING  12/26/2019         Suggested websites for health information:  Www.[employer].org : Up to date and easily searchable information on multiple topics.  Www.medlineplus.gov : medication info, interactive tutorials, watch real surgeries online  Www.familydoctor.org : good info from the Academy of Family Physicians  Www.cdc.gov : public health info, travel advisories, epidemics (H1N1)  Www.aap.org : children's health info, normal development, vaccinations  Www.health.[facility].[place].us : [place] dept of health, public health issues in [place], N1N1    Your care team:                            Family Medicine Internal Medicine   MD Kurt Bee MD Shantel Branch-Fleming, MD Katya Georgiev PA-C Nam Ho, MD Pediatrics   ERIKA Santos, MD Dorene Babcock CNP, MD Deborah Mielke, MD Kim Thein, APRN CNP      Clinic hours: Monday - Thursday 7 am-7 pm; Fridays 7 am-5 pm.   Urgent care: Monday - Friday 11 am-9 pm; Saturday and Sunday 9 am-5 pm.  Pharmacy : Monday -Thursday 8 am-8 pm; Friday 8 am-6 pm; Saturday and Sunday 9 am-5 pm.     Clinic: [phone]   Pharmacy: [phone]

## 2020-01-07 ASSESSMENT — PATIENT HEALTH QUESTIONNAIRE - PHQ9: SUM OF ALL RESPONSES TO PHQ QUESTIONS 1-9: 1

## 2020-01-08 ENCOUNTER — ANCILLARY PROCEDURE (OUTPATIENT)
Dept: MAMMOGRAPHY | Facility: CLINIC | Age: 75
End: 2020-01-08
Attending: FAMILY MEDICINE
Payer: COMMERCIAL

## 2020-01-08 ENCOUNTER — ANCILLARY PROCEDURE (OUTPATIENT)
Dept: BONE DENSITY | Facility: CLINIC | Age: 75
End: 2020-01-08
Attending: FAMILY MEDICINE
Payer: COMMERCIAL

## 2020-01-08 DIAGNOSIS — Z12.31 VISIT FOR SCREENING MAMMOGRAM: ICD-10-CM

## 2020-01-08 DIAGNOSIS — M89.9 BONE DISORDER: ICD-10-CM

## 2020-01-08 PROCEDURE — 77067 SCR MAMMO BI INCL CAD: CPT | Mod: TC

## 2020-01-08 PROCEDURE — 77080 DXA BONE DENSITY AXIAL: CPT | Performed by: INTERNAL MEDICINE

## 2020-01-08 NOTE — LETTER
[address]  [address]  [phone]    January 10, 2020      Negin Jonas  [address]  [place] [address]          Dear Negin Jonas,     Your test results are attached.     The bone density looks stable for your age. Continue to take vitamin D every day and eat 2-3 dairy foods day or take extra calcium. Recheck in 5 years.     Please call me if you have any questions about these test results or about your care.     Sincerely,     Marlyn Arboleda MD

## 2020-01-08 NOTE — RESULT ENCOUNTER NOTE
Dear Negin Jonas,    Your test results are attached. I am happy to let you know that they are stable and your medications can stay the same.    The blood sugar is normal and you do not have diabetes. The cholesterol is in good range. These were not fasting and were normal for non-fasting tests. The kidneys are healthy. We can recheck labs in 1 year.     Please call me if you have any questions about these test results or about your care.    Sincerely,    Marlyn Arboleda MD

## 2020-01-10 NOTE — RESULT ENCOUNTER NOTE
Dear Negin Jonas,    Your test results are attached.    The bone density looks stable for your age. Continue to take vitamin D every day and eat 2-3 dairy foods day or take extra calcium. Recheck in 5 years.     Please call me if you have any questions about these test results or about your care.    Sincerely,    Marlyn Arboleda MD

## 2020-01-14 ENCOUNTER — OFFICE VISIT (OUTPATIENT)
Dept: ORTHOPEDICS | Facility: CLINIC | Age: 75
End: 2020-01-14
Payer: COMMERCIAL

## 2020-01-14 VITALS
BODY MASS INDEX: 31.02 KG/M2 | WEIGHT: 193 LBS | HEART RATE: 64 BPM | OXYGEN SATURATION: 98 % | HEIGHT: 66 IN | DIASTOLIC BLOOD PRESSURE: 82 MMHG | SYSTOLIC BLOOD PRESSURE: 165 MMHG

## 2020-01-14 DIAGNOSIS — M17.0 PRIMARY OSTEOARTHRITIS OF BOTH KNEES: Primary | ICD-10-CM

## 2020-01-14 PROCEDURE — 20610 DRAIN/INJ JOINT/BURSA W/O US: CPT | Mod: 50 | Performed by: ORTHOPAEDIC SURGERY

## 2020-01-14 RX ORDER — METHYLPREDNISOLONE ACETATE 80 MG/ML
80 INJECTION, SUSPENSION INTRA-ARTICULAR; INTRALESIONAL; INTRAMUSCULAR; SOFT TISSUE
Status: DISCONTINUED | OUTPATIENT
Start: 2020-01-14 | End: 2021-03-11

## 2020-01-14 RX ORDER — LIDOCAINE HYDROCHLORIDE 10 MG/ML
5 INJECTION, SOLUTION INFILTRATION; PERINEURAL
Status: DISCONTINUED | OUTPATIENT
Start: 2020-01-14 | End: 2021-03-11

## 2020-01-14 RX ADMIN — METHYLPREDNISOLONE ACETATE 80 MG: 80 INJECTION, SUSPENSION INTRA-ARTICULAR; INTRALESIONAL; INTRAMUSCULAR; SOFT TISSUE at 09:42

## 2020-01-14 RX ADMIN — LIDOCAINE HYDROCHLORIDE 5 ML: 10 INJECTION, SOLUTION INFILTRATION; PERINEURAL at 09:42

## 2020-01-14 ASSESSMENT — MIFFLIN-ST. JEOR: SCORE: 1394.25

## 2020-01-14 NOTE — PATIENT INSTRUCTIONS
Negin to follow up with Primary Care provider regarding elevated blood pressure.    You have had a steroid injection today.  For the first 2 hours there will likely be some numbing in the joint from the lidocaine.  This is a good sign, indicating that the injection is in the right place.  In 2 hours the lidocaine will wear off, and the joint will hurt like you had a shot.  Each day the cortisone makes it feel better.  It reaches peak effect in 2 weeks.  We expect it to last for 3 months.  You may resume regular activity when you feel ready.  If you are diabetic, your glucoses will be quite high for several days.

## 2020-01-14 NOTE — PROGRESS NOTES
Large Joint Injection/Arthocentesis: bilateral knee  Date/Time: 1/14/2020 9:42 AM  Performed by: Hamilton Martinez MD  Authorized by: Hamilton Martinez MD     Indications:  Pain and osteoarthritis  Needle Size:  22 G  Guidance: landmark guided    Location:  Knee  Laterality:  Bilateral      Medications (Right):  80 mg methylPREDNISolone 80 MG/ML; 5 mL lidocaine 1 %  Medications (Left):  80 mg methylPREDNISolone 80 MG/ML; 5 mL lidocaine 1 %  Outcome:  Tolerated well, no immediate complications  Procedure discussed: discussed risks, benefits, and alternatives    Consent Given by:  Patient  Timeout: timeout called immediately prior to procedure    Prep: patient was prepped and draped in usual sterile fashion    Prep comment:  Betadine

## 2020-01-14 NOTE — LETTER
1/14/2020         RE: Negin Jonas  [address]  [place] [address]        Dear Colleague,    Thank you for referring your patient, Negin Jonas, to the [facility]. Please see a copy of my visit note below.        Large Joint Injection/Arthocentesis: bilateral knee  Date/Time: 1/14/2020 9:42 AM  Performed by: Hamilton Martinez MD  Authorized by: Hamilton Martinez MD     Indications:  Pain and osteoarthritis  Needle Size:  22 G  Guidance: landmark guided    Location:  Knee  Laterality:  Bilateral      Medications (Right):  80 mg methylPREDNISolone 80 MG/ML; 5 mL lidocaine 1 %  Medications (Left):  80 mg methylPREDNISolone 80 MG/ML; 5 mL lidocaine 1 %  Outcome:  Tolerated well, no immediate complications  Procedure discussed: discussed risks, benefits, and alternatives    Consent Given by:  Patient  Timeout: timeout called immediately prior to procedure    Prep: patient was prepped and draped in usual sterile fashion    Prep comment:  Betadine          Follow up bilateral knee primary osteoarthritis.  Has moderate osteoarthritis by x-ray.  Last injection 12/3/19 did not help.  She says they did not feel good the first 2 hours either.  She is wondering if total knee arthroplasty is needed.    Range of motion 0-120.    Positive medial joint line tenderness bilaterally.    We discussed repeat x-ray to see if it is bone-on-bone.  Also discussed repeat injection to make sure it is in good postition.  I recommend repeat injection.  She  desires injection today of bilateral knee(s).  Risks, benefits, potential complications and alternatives were discussed.   With the patient's consent, sterile prep was performed of bilateral knee(s).  Each knee was injected with Depo Medrol 80 mg and lidocaine at anteromedial site.  I kept her 10 minutes to make sure the knees felt good right away.  Return to clinic as needed.  We will repeat x-ray when she feels ready for total knee  arthroplasty.    Again, thank you for allowing me to participate in the care of your patient.        Sincerely,        Hamilton Martinez MD

## 2020-01-14 NOTE — PROGRESS NOTES
Follow up bilateral knee primary osteoarthritis.  Has moderate osteoarthritis by x-ray.  Last injection 12/3/19 did not help.  She says they did not feel good the first 2 hours either.  She is wondering if total knee arthroplasty is needed.    Range of motion 0-120.    Positive medial joint line tenderness bilaterally.    We discussed repeat x-ray to see if it is bone-on-bone.  Also discussed repeat injection to make sure it is in good postition.  I recommend repeat injection.  She  desires injection today of bilateral knee(s).  Risks, benefits, potential complications and alternatives were discussed.   With the patient's consent, sterile prep was performed of bilateral knee(s).  Each knee was injected with Depo Medrol 80 mg and lidocaine at anteromedial site.  I kept her 10 minutes to make sure the knees felt good right away.  Return to clinic as needed.  We will repeat x-ray when she feels ready for total knee arthroplasty.

## 2020-03-10 ENCOUNTER — OFFICE VISIT (OUTPATIENT)
Dept: ORTHOPEDICS | Facility: CLINIC | Age: 75
End: 2020-03-10
Payer: COMMERCIAL

## 2020-03-10 ENCOUNTER — ANCILLARY PROCEDURE (OUTPATIENT)
Dept: GENERAL RADIOLOGY | Facility: CLINIC | Age: 75
End: 2020-03-10
Attending: ORTHOPAEDIC SURGERY
Payer: COMMERCIAL

## 2020-03-10 ENCOUNTER — TELEPHONE (OUTPATIENT)
Dept: ORTHOPEDICS | Facility: CLINIC | Age: 75
End: 2020-03-10

## 2020-03-10 VITALS
HEIGHT: 66 IN | RESPIRATION RATE: 16 BRPM | SYSTOLIC BLOOD PRESSURE: 149 MMHG | HEART RATE: 60 BPM | BODY MASS INDEX: 31.02 KG/M2 | WEIGHT: 193 LBS | DIASTOLIC BLOOD PRESSURE: 77 MMHG

## 2020-03-10 DIAGNOSIS — M17.0 PRIMARY OSTEOARTHRITIS OF BOTH KNEES: ICD-10-CM

## 2020-03-10 DIAGNOSIS — M17.12 PRIMARY OSTEOARTHRITIS OF LEFT KNEE: Primary | ICD-10-CM

## 2020-03-10 PROCEDURE — 73562 X-RAY EXAM OF KNEE 3: CPT | Mod: LT

## 2020-03-10 PROCEDURE — 99214 OFFICE O/P EST MOD 30 MIN: CPT | Performed by: ORTHOPAEDIC SURGERY

## 2020-03-10 ASSESSMENT — MIFFLIN-ST. JEOR: SCORE: 1394.25

## 2020-03-10 NOTE — TELEPHONE ENCOUNTER
Type of surgery: left total knee arthroplasty  CPT 19977  Primary osteoarthritis of left knee [M17.12]  - Primary   Location of surgery: [facility]  Date and time of surgery: 4-22-20  Surgeon: Dr. Martinez  Pre-Op Appt Date: 4-9-20  Post-Op Appt Date: 5-5-20   Packet sent out: Yes  Pre-cert/Authorization completed: No pre cert needed, per [employer] online list.   Date: 03/10/2020    Thank you,   Liz Morales   [facility]  [phone]

## 2020-03-10 NOTE — LETTER
3/10/2020         RE: Neign Jonas  [address]  [facility] [address]        Dear Colleague,    Thank you for referring your patient, Negin Jonas, to the [facility]. Please see a copy of my visit note below.    HISTORY OF PRESENT ILLNESS    Negin Jonas is a 74 year old female who is seen as self referral for evaluation of  left knee pain that has been present approximately 3 years.      Present symptoms: pain anteriorly and diffuse, pain sharp, dull/achy , moderate pain, no swelling, +catching/popping, +locking, +giving way.    Symptoms occur walking, standing for long periods, getting up from seated or lying-down position , sitting for long periods and going up and down stairs.    The symptoms occur are constant.  The symptoms are increasing  Limitations of activities of daily living:  Cant do hobbies  Fall risk?: Yes    Treatments tried to this point: NSAIDs, Glucosamine, Corticosteroid injections and Physical Therapy  Response to treatment:   NSAIDs: has not noticed any benefit from taking the medications   Glucosamine: has not noticed any benefit from taking the medications   Corticosteroid injections: lasted for about a month and then wore off   viscous supplementation injection: Not done   Arthroscopy: Not done   Physical Therapy: did not help      Past Medical History:   Diagnosis Date     Advanced directives, counseling/discussion 3/7/2012     Anxiety attack 2006    EEG normal, improved with lamictal     Chronic rhinitis 1/31/2011     Closed head injury 2004    subarachnoid hemorrage/cerebral contusion with sequellae of significant diminished short term memory, word finding difficulties and emotional lability, was in coma for 2 months, on seizure prophylaxsis with lamictal, f/b neuro annually     Generalized anxiety disorder      Major depression      Major depression in complete remission (H) 1/31/2011    was seen by psychiatrist in past, has tried  Effexor and Seroquel and Hiram's Wart     Migraine headache     resolved s/p closed head injury     Panic attacks      Psoriasis 1/31/2011     Short-term memory loss 2004    secondary to closed head injury       Past Surgical History:   Procedure Laterality Date     ESOPHAGOSCOPY, GASTROSCOPY, DUODENOSCOPY (EGD), COMBINED N/A 12/17/2015    Procedure: COMBINED ESOPHAGOSCOPY, GASTROSCOPY, DUODENOSCOPY (EGD);  Surgeon: Duane, William Charles, MD;  Location: [facility] OR     ESOPHAGOSCOPY, GASTROSCOPY, DUODENOSCOPY (EGD), COMBINED N/A 12/17/2015    Procedure: COMBINED ESOPHAGOSCOPY, GASTROSCOPY, DUODENOSCOPY (EGD), BIOPSY SINGLE OR MULTIPLE;  Surgeon: Duane, William Charles, MD;  Location: [facility] OR     HC REMOVAL GALLBLADDER       HYSTERECTOMY, PAP NO LONGER INDICATED  1992    with ovaries     RELEASE CARPAL TUNNEL BILATERAL         Family History   Problem Relation Age of Onset     C.A.D. Father 70        CABG x 3      Respiratory Father         emphysema     Psychotic Disorder Father         depression     Diabetes Mother      Psychotic Disorder Mother         depression     Diabetes Maternal Grandfather      Hypertension Sister      Anxiety Disorder Sister      Respiratory Sister         emphysema       Social History     Socioeconomic History     Marital status:      Spouse name: Not on file     Number of children: Not on file     Years of education: Not on file     Highest education level: Not on file   Occupational History     Not on file   Social Needs     Financial resource strain: Not on file     Food insecurity     Worry: Not on file     Inability: Not on file     Transportation needs     Medical: Not on file     Non-medical: Not on file   Tobacco Use     Smoking status: Former Smoker     Packs/day: 1.00     Years: 25.00     Pack years: 25.00     Smokeless tobacco: Never Used     Tobacco comment: Quit 15 years ago.   Substance and Sexual Activity     Alcohol use: No     Drug use: No     Sexual activity: Never    Lifestyle     Physical activity     Days per week: Not on file     Minutes per session: Not on file     Stress: Not on file   Relationships     Social connections     Talks on phone: Not on file     Gets together: Not on file     Attends [employer] service: Not on file     Active member of club or organization: Not on file     Attends meetings of clubs or organizations: Not on file     Relationship status: Not on file     Intimate partner violence     Fear of current or ex partner: Not on file     Emotionally abused: Not on file     Physically abused: Not on file     Forced sexual activity: Not on file   Other Topics Concern     Parent/sibling w/ CABG, MI or angioplasty before 65F 55M? Not Asked   Social History Narrative     Not on file       Current Outpatient Medications   Medication Sig Dispense Refill     aspirin (ASPIRIN ADULT LOW STRENGTH) 81 MG EC tablet Take 1 tablet (81 mg) by mouth daily 90 tablet 3     citalopram (CELEXA) 20 MG tablet Take 1 tablet (20 mg) by mouth daily 90 tablet 3     fluticasone (FLONASE) 50 MCG/ACT nasal spray Spray 1-2 sprays into both nostrils daily For nasal allergies 16 g 3     lamoTRIgine (LAMICTAL) 200 MG tablet Take 1 tablet (200 mg) by mouth daily Reported on 2/15/2017 90 tablet 3     omeprazole (PRILOSEC) 20 MG DR capsule Take 2 capsules (40 mg) by mouth daily 180 capsule 2     triamcinolone (KENALOG) 0.1 % external cream apply topically twice a day as needed for a maximum of 14 days 80 g 1       Allergies   Allergen Reactions     Codeine Sulfate        REVIEW OF SYSTEMS:  CONSTITUTIONAL:  NEGATIVE for fever, chills, change in weight, not feeling tired  SKIN:  NEGATIVE for worrisome rashes, no skin lumps, no skin ulcers and no non-healing wounds  EYES:  NEGATIVE for vision changes or irritation  ENT/MOUTH:  NEGATIVE  No hearing loss, no hoarseness, no difficulty swallowing.  RESP:  NEGATIVE for significant cough or SOB  BREAST:  NEGATIVE for masses, tenderness or  "discharge  CV:  NEGATIVE for chest pain, palpitations or peripheral edema  GI:  NEGATIVE for nausea, abdominal pain, heartburn, or change in bowel habits  :  Negative   MUSCULOSKELETAL:  See HPI above  NEURO:  NEGATIVE for weakness, dizziness or paresthesias  ENDOCRINE:  NEGATIVE for temperature intolerance, skin/hair changes  HEME/ALLERGY/IMMUNE:  NEGATIVE for bleeding problems  PSYCHIATRIC:  NEGATIVE for changes in mood or affect    PHYSICAL EXAM:  Vitals: BP (!) 149/77   Pulse 60   Resp 16   Ht 1.664 m (5' 5.5\")   Wt 87.5 kg (193 lb)   BMI 31.63 kg/m    BMI= Body mass index is 31.63 kg/m .  Constitutional:  healthy, alert and no distress  Neuro: Alert and Oriented x 3, Sensation grossly WNL.  HEENT:  Atraumatic, EOMI  Neck:  Neck supple with no tenderness.  Psych: Affect normal   Respiratory: Breathing not labored.  Cardiovascular: normal peripheral pulses  Lymph: no adenopathy  Skin: No rashes,worrisome lesions or skin problems  Spine: straight, no straight leg raising pain.  Hips show full range of motion.  There is no tenderness over the sacro-iliac joints, sciatic notch, or greater trochanters.     KNEE EXAM:   Gait: walks with antalgic gait favoring left side   Alignment: Right: mild varus, Left:  mild varus  ROM: Right knee: 0 extension to 120 flexion, Left knee: 0 extension to 120 flexion  Effusion: Right: mild, Left: mild  Tender: Right: medial joint line, Left: medial joint line  Ligaments:  Lachman's Stable, Anterior and posterior drawer stable.  Right medial collateral ligament:  no laxity,  Lateral collateral ligament  no laxity.    Left medial collateral ligament:  no laxity, lateral collateral ligament:  no laxity.   mild pain with Varus/Valgus stress testing.    Patellofemoral joint: mild crepitations in the bilateral patellofemoral joint.    Apprehension: negative      X-RAY:  From today 3/10/2020: shows significant medial joint space narrowing  There is bone-on-bone medially  by lateral " x-ray on both knees..    Oxford score: 12     Impression: Right Knee: Osteoarthritis severe.  Left Knee: Osteoarthritis severe.  Worse symptoms on left.     Plan: Total Knee Arthroplasty: We talked about the patient's condition and diagnosis.  Because of severe arthritis, and failure of conservative measures, I have suggested total knee arthroplasty of the left knee(s).  I've talked in depth about the procedure and the risks, which include, but are not limited to blood loss requiring transfusion, infection, neurovascular injury, DVT, PE, continued pain, (both perioperative and persistent post-recovery pain), numbness around the wound, instability, and potential anesthetic and perioperative medical complications, and the possibility of needing additional procedures. We also talked about recovery time, which differs from patient to patient.  We've encouraged attendance at the arthroplasty class at the hospital.  Medical clearance will need to be obtained.      Special considerations: cemented likely        Again, thank you for allowing me to participate in the care of your patient.        Sincerely,        Hamilton Martinez MD

## 2020-03-10 NOTE — PATIENT INSTRUCTIONS
Ms. Jonas and I talked about her condition and diagnosis.  Because of severe arthritis, and failure of conservative measures, we have decided to proceed with  total knee arthroplasty.      We have talked in depth about the procedure and the risks, which include, but are not limited to:  * blood loss possibly requiring transfusion,   * blood clots with possible pulmonary embolism  * infection or wound healing problems  * nerve damage - there will always be a bit of numbness just to outside of knee,  * vascular circulation problems  * continued pain  * instability of the knee.  * Loosening or wear  * anesthetic risks  * The possibility of needing additional procedures.      We also talked about recovery time, which differs from patient to patient.    Average 2 nights in the hospital.  Most people can return home at that point.  You will be in Physical Therapy for 1-2 months until you have gained full range of motion.    We've encouraged attendance at the joint replacement class at the hospital.    Once you are placed on the surgery schedule, the [facility] will call you with the joint replacement class dates and times.  If you wish to schedule this yourself, or have additional questions about the class, you may call them at [phone].  Preop xrays have been ordered, and medical clearance will need to be obtained.    Preop physical with primary physician is needed within 30 days of the surgery.  Nothing to eat or drink for 8 hours before surgery.  Stop blood thinners 5 days before surgery (aspirin, warfarin, anti-inflammatories).      Call [phone] to schedule your surgery.

## 2020-03-10 NOTE — PROGRESS NOTES
HISTORY OF PRESENT ILLNESS    Negin Jonas is a 74 year old female who is seen as self referral for evaluation of  left knee pain that has been present approximately 3 years.      Present symptoms: pain anteriorly and diffuse, pain sharp, dull/achy , moderate pain, no swelling, +catching/popping, +locking, +giving way.    Symptoms occur walking, standing for long periods, getting up from seated or lying-down position , sitting for long periods and going up and down stairs.    The symptoms occur are constant.  The symptoms are increasing  Limitations of activities of daily living:  Cant do hobbies  Fall risk?: Yes    Treatments tried to this point: NSAIDs, Glucosamine, Corticosteroid injections and Physical Therapy  Response to treatment:   NSAIDs: has not noticed any benefit from taking the medications   Glucosamine: has not noticed any benefit from taking the medications   Corticosteroid injections: lasted for about a month and then wore off   viscous supplementation injection: Not done   Arthroscopy: Not done   Physical Therapy: did not help      Past Medical History:   Diagnosis Date     Advanced directives, counseling/discussion 3/7/2012     Anxiety attack 2006    EEG normal, improved with lamictal     Chronic rhinitis 1/31/2011     Closed head injury 2004    subarachnoid hemorrage/cerebral contusion with sequellae of significant diminished short term memory, word finding difficulties and emotional lability, was in coma for 2 months, on seizure prophylaxsis with lamictal, f/b neuro annually     Generalized anxiety disorder      Major depression      Major depression in complete remission (H) 1/31/2011    was seen by psychiatrist in past, has tried Effexor and Seroquel and Hiram's Wart     Migraine headache     resolved s/p closed head injury     Panic attacks      Psoriasis 1/31/2011     Short-term memory loss 2004    secondary to closed head injury       Past Surgical History:   Procedure Laterality  Date     ESOPHAGOSCOPY, GASTROSCOPY, DUODENOSCOPY (EGD), COMBINED N/A 12/17/2015    Procedure: COMBINED ESOPHAGOSCOPY, GASTROSCOPY, DUODENOSCOPY (EGD);  Surgeon: Duane, William Charles, MD;  Location: [facility]     ESOPHAGOSCOPY, GASTROSCOPY, DUODENOSCOPY (EGD), COMBINED N/A 12/17/2015    Procedure: COMBINED ESOPHAGOSCOPY, GASTROSCOPY, DUODENOSCOPY (EGD), BIOPSY SINGLE OR MULTIPLE;  Surgeon: Duane, William Charles, MD;  Location: [facility] OR     HC REMOVAL GALLBLADDER       HYSTERECTOMY, PAP NO LONGER INDICATED  1992    with ovaries     RELEASE CARPAL TUNNEL BILATERAL         Family History   Problem Relation Age of Onset     C.A.D. Father 70        CABG x 3      Respiratory Father         emphysema     Psychotic Disorder Father         depression     Diabetes Mother      Psychotic Disorder Mother         depression     Diabetes Maternal Grandfather      Hypertension Sister      Anxiety Disorder Sister      Respiratory Sister         emphysema       Social History     Socioeconomic History     Marital status:      Spouse name: Not on file     Number of children: Not on file     Years of education: Not on file     Highest education level: Not on file   Occupational History     Not on file   Social Needs     Financial resource strain: Not on file     Food insecurity     Worry: Not on file     Inability: Not on file     Transportation needs     Medical: Not on file     Non-medical: Not on file   Tobacco Use     Smoking status: Former Smoker     Packs/day: 1.00     Years: 25.00     Pack years: 25.00     Smokeless tobacco: Never Used     Tobacco comment: Quit 15 years ago.   Substance and Sexual Activity     Alcohol use: No     Drug use: No     Sexual activity: Never   Lifestyle     Physical activity     Days per week: Not on file     Minutes per session: Not on file     Stress: Not on file   Relationships     Social connections     Talks on phone: Not on file     Gets together: Not on file     Attends Latter-day service:  Not on file     Active member of club or organization: Not on file     Attends meetings of clubs or organizations: Not on file     Relationship status: Not on file     Intimate partner violence     Fear of current or ex partner: Not on file     Emotionally abused: Not on file     Physically abused: Not on file     Forced sexual activity: Not on file   Other Topics Concern     Parent/sibling w/ CABG, MI or angioplasty before 65F 55M? Not Asked   Social History Narrative     Not on file       Current Outpatient Medications   Medication Sig Dispense Refill     aspirin (ASPIRIN ADULT LOW STRENGTH) 81 MG EC tablet Take 1 tablet (81 mg) by mouth daily 90 tablet 3     citalopram (CELEXA) 20 MG tablet Take 1 tablet (20 mg) by mouth daily 90 tablet 3     fluticasone (FLONASE) 50 MCG/ACT nasal spray Spray 1-2 sprays into both nostrils daily For nasal allergies 16 g 3     lamoTRIgine (LAMICTAL) 200 MG tablet Take 1 tablet (200 mg) by mouth daily Reported on 2/15/2017 90 tablet 3     omeprazole (PRILOSEC) 20 MG DR capsule Take 2 capsules (40 mg) by mouth daily 180 capsule 2     triamcinolone (KENALOG) 0.1 % external cream apply topically twice a day as needed for a maximum of 14 days 80 g 1       Allergies   Allergen Reactions     Codeine Sulfate        REVIEW OF SYSTEMS:  CONSTITUTIONAL:  NEGATIVE for fever, chills, change in weight, not feeling tired  SKIN:  NEGATIVE for worrisome rashes, no skin lumps, no skin ulcers and no non-healing wounds  EYES:  NEGATIVE for vision changes or irritation  ENT/MOUTH:  NEGATIVE  No hearing loss, no hoarseness, no difficulty swallowing.  RESP:  NEGATIVE for significant cough or SOB  BREAST:  NEGATIVE for masses, tenderness or discharge  CV:  NEGATIVE for chest pain, palpitations or peripheral edema  GI:  NEGATIVE for nausea, abdominal pain, heartburn, or change in bowel habits  :  Negative   MUSCULOSKELETAL:  See HPI above  NEURO:  NEGATIVE for weakness, dizziness or  "paresthesias  ENDOCRINE:  NEGATIVE for temperature intolerance, skin/hair changes  HEME/ALLERGY/IMMUNE:  NEGATIVE for bleeding problems  PSYCHIATRIC:  NEGATIVE for changes in mood or affect    PHYSICAL EXAM:  Vitals: BP (!) 149/77   Pulse 60   Resp 16   Ht 1.664 m (5' 5.5\")   Wt 87.5 kg (193 lb)   BMI 31.63 kg/m    BMI= Body mass index is 31.63 kg/m .  Constitutional:  healthy, alert and no distress  Neuro: Alert and Oriented x 3, Sensation grossly WNL.  HEENT:  Atraumatic, EOMI  Neck:  Neck supple with no tenderness.  Psych: Affect normal   Respiratory: Breathing not labored.  Cardiovascular: normal peripheral pulses  Lymph: no adenopathy  Skin: No rashes,worrisome lesions or skin problems  Spine: straight, no straight leg raising pain.  Hips show full range of motion.  There is no tenderness over the sacro-iliac joints, sciatic notch, or greater trochanters.     KNEE EXAM:   Gait: walks with antalgic gait favoring left side   Alignment: Right: mild varus, Left:  mild varus  ROM: Right knee: 0 extension to 120 flexion, Left knee: 0 extension to 120 flexion  Effusion: Right: mild, Left: mild  Tender: Right: medial joint line, Left: medial joint line  Ligaments:  Lachman's Stable, Anterior and posterior drawer stable.  Right medial collateral ligament:  no laxity,  Lateral collateral ligament  no laxity.    Left medial collateral ligament:  no laxity, lateral collateral ligament:  no laxity.   mild pain with Varus/Valgus stress testing.    Patellofemoral joint: mild crepitations in the bilateral patellofemoral joint.    Apprehension: negative      X-RAY:  From today 3/10/2020: shows significant medial joint space narrowing  There is bone-on-bone medially  by lateral x-ray on both knees..    Oxford score: 12     Impression: Right Knee: Osteoarthritis severe.  Left Knee: Osteoarthritis severe.  Worse symptoms on left.     Plan: Total Knee Arthroplasty: We talked about the patient's condition and diagnosis.  " Because of severe arthritis, and failure of conservative measures, I have suggested total knee arthroplasty of the left knee(s).  I've talked in depth about the procedure and the risks, which include, but are not limited to blood loss requiring transfusion, infection, neurovascular injury, DVT, PE, continued pain, (both perioperative and persistent post-recovery pain), numbness around the wound, instability, and potential anesthetic and perioperative medical complications, and the possibility of needing additional procedures. We also talked about recovery time, which differs from patient to patient.  We've encouraged attendance at the arthroplasty class at the hospital.  Medical clearance will need to be obtained.      Special considerations: cemented likely

## 2020-05-11 ENCOUNTER — TELEPHONE (OUTPATIENT)
Dept: ORTHOPEDICS | Facility: CLINIC | Age: 75
End: 2020-05-11

## 2020-05-11 DIAGNOSIS — Z01.818 PRE-OP TESTING: Primary | ICD-10-CM

## 2020-05-11 NOTE — TELEPHONE ENCOUNTER
Rescheduled from 4-22-20 [place] [facility] to 6-10-20 @ [facility].

## 2020-05-11 NOTE — TELEPHONE ENCOUNTER
Patient is scheduled for surgery 6-10-20 and needs order for COVID. Please place order and mail to patient or have  in clinic.    Thank you.

## 2020-05-11 NOTE — TELEPHONE ENCOUNTER
Left message with patient to let her know that we are sending her info. For a Covid19 testing to be done 72 hours before her upcoming surgery.    Jenna Peraza MA

## 2020-05-29 ENCOUNTER — OFFICE VISIT (OUTPATIENT)
Dept: FAMILY MEDICINE | Facility: CLINIC | Age: 75
End: 2020-05-29
Payer: COMMERCIAL

## 2020-05-29 VITALS
TEMPERATURE: 98.3 F | DIASTOLIC BLOOD PRESSURE: 52 MMHG | WEIGHT: 180 LBS | SYSTOLIC BLOOD PRESSURE: 154 MMHG | OXYGEN SATURATION: 97 % | BODY MASS INDEX: 28.93 KG/M2 | HEART RATE: 57 BPM | RESPIRATION RATE: 16 BRPM | HEIGHT: 66 IN

## 2020-05-29 DIAGNOSIS — S06.9X9S TRAUMATIC BRAIN INJURY WITH LOSS OF CONSCIOUSNESS, SEQUELA (H): ICD-10-CM

## 2020-05-29 DIAGNOSIS — M17.0 PRIMARY OSTEOARTHRITIS OF BOTH KNEES: ICD-10-CM

## 2020-05-29 DIAGNOSIS — R41.3 SHORT-TERM MEMORY LOSS: ICD-10-CM

## 2020-05-29 DIAGNOSIS — Z01.818 PREOP GENERAL PHYSICAL EXAM: Primary | ICD-10-CM

## 2020-05-29 DIAGNOSIS — R82.90 NONSPECIFIC FINDING ON EXAMINATION OF URINE: ICD-10-CM

## 2020-05-29 DIAGNOSIS — G40.909 SEIZURE DISORDER (H): ICD-10-CM

## 2020-05-29 DIAGNOSIS — F33.41 RECURRENT MAJOR DEPRESSIVE DISORDER, IN PARTIAL REMISSION (H): ICD-10-CM

## 2020-05-29 LAB
ALBUMIN UR-MCNC: NEGATIVE MG/DL
APPEARANCE UR: CLEAR
BACTERIA #/AREA URNS HPF: ABNORMAL /HPF
BILIRUB UR QL STRIP: NEGATIVE
COLOR UR AUTO: YELLOW
ERYTHROCYTE [DISTWIDTH] IN BLOOD BY AUTOMATED COUNT: 13.7 % (ref 10–15)
GLUCOSE UR STRIP-MCNC: NEGATIVE MG/DL
HCT VFR BLD AUTO: 39.8 % (ref 35–47)
HGB BLD-MCNC: 13.1 G/DL (ref 11.7–15.7)
HGB UR QL STRIP: ABNORMAL
KETONES UR STRIP-MCNC: NEGATIVE MG/DL
LEUKOCYTE ESTERASE UR QL STRIP: ABNORMAL
MCH RBC QN AUTO: 28.5 PG (ref 26.5–33)
MCHC RBC AUTO-ENTMCNC: 32.9 G/DL (ref 31.5–36.5)
MCV RBC AUTO: 87 FL (ref 78–100)
NITRATE UR QL: NEGATIVE
NON-SQ EPI CELLS #/AREA URNS LPF: ABNORMAL /LPF
PH UR STRIP: 6.5 PH (ref 5–7)
PLATELET # BLD AUTO: 245 10E9/L (ref 150–450)
RBC # BLD AUTO: 4.59 10E12/L (ref 3.8–5.2)
RBC #/AREA URNS AUTO: ABNORMAL /HPF
SOURCE: ABNORMAL
SP GR UR STRIP: 1.02 (ref 1–1.03)
UROBILINOGEN UR STRIP-ACNC: 0.2 EU/DL (ref 0.2–1)
WBC # BLD AUTO: 6.8 10E9/L (ref 4–11)
WBC #/AREA URNS AUTO: ABNORMAL /HPF

## 2020-05-29 PROCEDURE — 36415 COLL VENOUS BLD VENIPUNCTURE: CPT | Performed by: FAMILY MEDICINE

## 2020-05-29 PROCEDURE — 87086 URINE CULTURE/COLONY COUNT: CPT | Performed by: FAMILY MEDICINE

## 2020-05-29 PROCEDURE — 81001 URINALYSIS AUTO W/SCOPE: CPT | Performed by: FAMILY MEDICINE

## 2020-05-29 PROCEDURE — 85027 COMPLETE CBC AUTOMATED: CPT | Performed by: FAMILY MEDICINE

## 2020-05-29 PROCEDURE — 99214 OFFICE O/P EST MOD 30 MIN: CPT | Performed by: FAMILY MEDICINE

## 2020-05-29 ASSESSMENT — PAIN SCALES - GENERAL: PAINLEVEL: MODERATE PAIN (5)

## 2020-05-29 ASSESSMENT — MIFFLIN-ST. JEOR: SCORE: 1330.28

## 2020-05-29 NOTE — PATIENT INSTRUCTIONS
At [facility], we strive to deliver an exceptional experience to you, every time we see you.  If you receive a survey in the mail, please send us back your thoughts. We really do value your feedback.    Based on your medical history, these are the current health maintenance/preventive care services that you are due for (some may have been done at this visit.)  Health Maintenance Due   Topic Date Due     ZOSTER IMMUNIZATION (2 of 3) 06/06/2014     ADVANCE CARE PLANNING  03/07/2017     MEDICARE ANNUAL WELLNESS VISIT  03/21/2020     FALL RISK ASSESSMENT  03/21/2020     COLORECTAL CANCER SCREENING  03/25/2020         Suggested websites for health information:  Www.[facility].org : Up to date and easily searchable information on multiple topics.  Www.medlineplus.gov : medication info, interactive tutorials, watch real surgeries online  Www.familydoctor.org : good info from the Academy of Family Physicians  Www.cdc.gov : public health info, travel advisories, epidemics (H1N1)  Www.aap.org : children's health info, normal development, vaccinations  Www.health.[facility].[place].us : [place] dept of health, public health issues in [place], N1N1    Your care team:                            Family Medicine Internal Medicine   MD Kurt Bee MD Shantel Branch-Fleming, MD Katya Georgiev PA-C Nam Ho, MD Pediatrics   ERIKA Santos, MD Dorene Babcock CNP, MD Deborah Mielke, MD Kim Thein, APRN [facility]      Clinic hours: Monday - Thursday 7 am-7 pm; Fridays 7 am-5 pm.   Urgent care: Monday - Friday 11 am-9 pm; Saturday and Sunday 9 am-5 pm.  Pharmacy : Monday -Thursday 8 am-8 pm; Friday 8 am-6 pm; Saturday and Sunday 9 am-5 pm.     Clinic: [phone]   Pharmacy: [phone]    Before Your Surgery      Call your surgeon if there is any change in your health. This includes signs of a cold or flu (such as a sore throat, runny nose, cough,  rash or fever).    Do not smoke, drink alcohol or take over the counter medicine (unless your surgeon or primary care doctor tells you to) for the 24 hours before and after surgery.    If you take prescribed drugs: Follow your doctor s orders about which medicines to take and which to stop until after surgery.    Eating and drinking prior to surgery: follow the instructions from your surgeon    Take a shower or bath the night before surgery. Use the soap your surgeon gave you to gently clean your skin. If you do not have soap from your surgeon, use your regular soap. Do not shave or scrub the surgery site.  Wear clean pajamas and have clean sheets on your bed.
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
## 2020-05-30 LAB
BACTERIA SPEC CULT: NORMAL
SPECIMEN SOURCE: NORMAL

## 2020-06-01 DIAGNOSIS — Z01.818 PRE-OP TESTING: Primary | ICD-10-CM

## 2020-06-02 NOTE — PROGRESS NOTES
Pre op and Labs faxed to [facility]   Fax number for surgical facility: [phone]

## 2020-06-07 DIAGNOSIS — Z01.818 PRE-OP TESTING: ICD-10-CM

## 2020-06-07 PROCEDURE — U0003 INFECTIOUS AGENT DETECTION BY NUCLEIC ACID (DNA OR RNA); SEVERE ACUTE RESPIRATORY SYNDROME CORONAVIRUS 2 (SARS-COV-2) (CORONAVIRUS DISEASE [COVID-19]), AMPLIFIED PROBE TECHNIQUE, MAKING USE OF HIGH THROUGHPUT TECHNOLOGIES AS DESCRIBED BY CMS-2020-01-R: HCPCS | Mod: 90 | Performed by: ORTHOPAEDIC SURGERY

## 2020-06-07 PROCEDURE — 99000 SPECIMEN HANDLING OFFICE-LAB: CPT | Performed by: ORTHOPAEDIC SURGERY

## 2020-06-08 LAB
SARS-COV-2 RNA SPEC QL NAA+PROBE: NOT DETECTED
SPECIMEN SOURCE: NORMAL

## 2020-06-23 ENCOUNTER — ANCILLARY PROCEDURE (OUTPATIENT)
Dept: GENERAL RADIOLOGY | Facility: CLINIC | Age: 75
End: 2020-06-23
Attending: ORTHOPAEDIC SURGERY
Payer: COMMERCIAL

## 2020-06-23 ENCOUNTER — OFFICE VISIT (OUTPATIENT)
Dept: ORTHOPEDICS | Facility: CLINIC | Age: 75
End: 2020-06-23
Payer: COMMERCIAL

## 2020-06-23 VITALS — OXYGEN SATURATION: 96 % | SYSTOLIC BLOOD PRESSURE: 149 MMHG | DIASTOLIC BLOOD PRESSURE: 74 MMHG | HEART RATE: 90 BPM

## 2020-06-23 DIAGNOSIS — Z96.652 STATUS POST TOTAL LEFT KNEE REPLACEMENT: ICD-10-CM

## 2020-06-23 DIAGNOSIS — Z96.652 STATUS POST TOTAL LEFT KNEE REPLACEMENT: Primary | ICD-10-CM

## 2020-06-23 PROCEDURE — 99024 POSTOP FOLLOW-UP VISIT: CPT | Performed by: ORTHOPAEDIC SURGERY

## 2020-06-23 PROCEDURE — 73562 X-RAY EXAM OF KNEE 3: CPT | Mod: LT

## 2020-06-23 RX ORDER — OXYCODONE HYDROCHLORIDE 5 MG/1
5 TABLET ORAL EVERY 4 HOURS PRN
Qty: 42 TABLET | Refills: 0 | Status: SHIPPED | OUTPATIENT
Start: 2020-06-23 | End: 2020-06-30

## 2020-06-23 ASSESSMENT — PAIN SCALES - GENERAL: PAINLEVEL: SEVERE PAIN (6)

## 2020-06-23 NOTE — LETTER
6/23/2020         RE: Negin Jonas  [address]  [place] [address]        Dear Colleague,    Thank you for referring your patient, Negin Jonas, to the [facility]. Please see a copy of my visit note below.    Follow up left total knee arthroplasty on 6/10/2020.   She complains of significant pain.  She has not been using graduated compression stockings, and she has significant whole leg swelling.  Pain: moderate   Wound is healing well.   Erythema: mild   Increased warmth: mild   Tenderness: moderate  Effusion: mild.  Edema of whole leg present.  Range of motion 5-95 degrees  Stability: good    Xray: today's images were reviewed with the patient.  This shows excellent position of total knee arthroplasty components    Assessment/Plan: left total knee arthroplasty with significant edema and pain.  Continue physical therapy 2-3 x week for 4 weeks.   Tubigrip applied.   Use aspirin for 4 more weeks.  Start scar massage with vitamin-E cream.   Return to clinic 4 weeks.    Medication renewal: oxycodone.        Again, thank you for allowing me to participate in the care of your patient.        Sincerely,        Hamilton Martinez MD

## 2020-06-23 NOTE — PATIENT INSTRUCTIONS
Continue physical therapy 2-3 x week for 4 weeks.   Use TEDS for 2 more weeks.  They may be off at night.  Use aspirin for 4 more weeks.  Start scar massage with vitamin-E cream.   Return to clinic 4 weeks.

## 2020-06-23 NOTE — PROGRESS NOTES
Follow up left total knee arthroplasty on 6/10/2020.   She complains of significant pain.  She has not been using graduated compression stockings, and she has significant whole leg swelling.  Pain: moderate   Wound is healing well.   Erythema: mild   Increased warmth: mild   Tenderness: moderate  Effusion: mild.  Edema of whole leg present.  Range of motion 5-95 degrees  Stability: good    Xray: today's images were reviewed with the patient.  This shows excellent position of total knee arthroplasty components    Assessment/Plan: left total knee arthroplasty with significant edema and pain.  Continue physical therapy 2-3 x week for 4 weeks.   Tubigrip applied.   Use aspirin for 4 more weeks.  Start scar massage with vitamin-E cream.   Return to clinic 4 weeks.    Medication renewal: oxycodone.

## 2020-06-23 NOTE — NURSING NOTE
Negin to follow up with Primary Care provider regarding elevated blood pressure.    Desmond CANDELARIA

## 2020-07-21 ENCOUNTER — OFFICE VISIT (OUTPATIENT)
Dept: ORTHOPEDICS | Facility: CLINIC | Age: 75
End: 2020-07-21
Payer: COMMERCIAL

## 2020-07-21 VITALS
HEIGHT: 66 IN | SYSTOLIC BLOOD PRESSURE: 155 MMHG | RESPIRATION RATE: 16 BRPM | DIASTOLIC BLOOD PRESSURE: 81 MMHG | BODY MASS INDEX: 28.93 KG/M2 | HEART RATE: 65 BPM | WEIGHT: 180 LBS

## 2020-07-21 DIAGNOSIS — Z96.652 HISTORY OF TOTAL LEFT KNEE REPLACEMENT: ICD-10-CM

## 2020-07-21 PROCEDURE — 99024 POSTOP FOLLOW-UP VISIT: CPT | Performed by: ORTHOPAEDIC SURGERY

## 2020-07-21 ASSESSMENT — MIFFLIN-ST. JEOR: SCORE: 1330.28

## 2020-07-21 NOTE — PATIENT INSTRUCTIONS
Negin to follow up with Primary Care provider regarding elevated blood pressure.    Doing well status post left total knee arthroplasty.  Work on extension.  Scar massage.  Return to clinic 4-6 weeks to check Oxford score.

## 2020-07-21 NOTE — LETTER
7/21/2020         RE: Negin Jonas  [address]  [place] [address]        Dear Colleague,    Thank you for referring your patient, Negin Jonas, to the [facility]. Please see a copy of my visit note below.    Follow up left total knee arthroplasty on 6/10/2020.  She has no complaints.    Pain: mild.  Takes tylenol.  Range of motion 4-125 degrees  Good stability.  There is no effusion.  There is no erythema    Assessment/Plan:  Doing well status post left total knee arthroplasty.  Work on extension.  Scar massage.  Return to clinic 4-6 weeks to check Oxford score.        Again, thank you for allowing me to participate in the care of your patient.        Sincerely,        Hamilton Martinez MD

## 2020-07-21 NOTE — PROGRESS NOTES
Follow up left total knee arthroplasty on 6/10/2020.  She has no complaints.    Pain: mild.  Takes tylenol.  Range of motion 4-125 degrees  Good stability.  There is no effusion.  There is no erythema    Assessment/Plan:  Doing well status post left total knee arthroplasty.  Work on extension.  Scar massage.  Return to clinic 4-6 weeks to check Oxford score.

## 2020-08-20 DIAGNOSIS — J31.0 CHRONIC RHINITIS: ICD-10-CM

## 2020-08-20 RX ORDER — FLUTICASONE PROPIONATE 50 MCG
1-2 SPRAY, SUSPENSION (ML) NASAL DAILY
Qty: 16 G | Refills: 3 | Status: SHIPPED | OUTPATIENT
Start: 2020-08-20 | End: 2020-12-20

## 2020-09-01 ENCOUNTER — OFFICE VISIT (OUTPATIENT)
Dept: ORTHOPEDICS | Facility: CLINIC | Age: 75
End: 2020-09-01
Payer: COMMERCIAL

## 2020-09-01 VITALS
WEIGHT: 180 LBS | OXYGEN SATURATION: 97 % | HEART RATE: 74 BPM | HEIGHT: 66 IN | DIASTOLIC BLOOD PRESSURE: 75 MMHG | BODY MASS INDEX: 28.93 KG/M2 | SYSTOLIC BLOOD PRESSURE: 165 MMHG

## 2020-09-01 DIAGNOSIS — Z96.652 HISTORY OF TOTAL LEFT KNEE REPLACEMENT: Primary | ICD-10-CM

## 2020-09-01 PROCEDURE — 99024 POSTOP FOLLOW-UP VISIT: CPT | Performed by: ORTHOPAEDIC SURGERY

## 2020-09-01 RX ORDER — AMOXICILLIN 500 MG/1
2000 CAPSULE ORAL ONCE
Qty: 20 CAPSULE | Refills: 11 | Status: SHIPPED | OUTPATIENT
Start: 2020-09-01 | End: 2020-09-01

## 2020-09-01 ASSESSMENT — MIFFLIN-ST. JEOR: SCORE: 1330.28

## 2020-09-01 NOTE — PATIENT INSTRUCTIONS
Negin to follow up with Primary Care provider regarding elevated blood pressure.      left total knee arthroplasty doing well.   Discontinue physical therapy.  Continue range of motion and strengthening on her own  Scar massage.  Take antibiotics 1 hour before dental work.  Return to clinic 1 year postoperative ( June ) with xrays of left knee

## 2020-09-01 NOTE — PROGRESS NOTES
Follow up left total knee arthroplasty on 6/10/2020.   She complains of anterior right knee pain. .  Pain: minimal in left knee.        Oxford score: preop: 12          3 months: 40    Range of motion 1-125 degrees  Laxity:  no medial collateral ligament, no lateral collateral ligament.  There is mild effusion.  There is no erythema  There is mild skin adherence.    Assessment/Plan:  left total knee arthroplasty doing well.   Discontinue physical therapy.  Continue range of motion and strengthening on her own  Scar massage.  Take antibiotics 1 hour before dental work.  Return to clinic 1 year postoperative ( June ) with xrays of left knee   Oxford score in 1 year.

## 2020-09-01 NOTE — LETTER
9/1/2020         RE: Negin Jonas  [address]  [place] [address]        Dear Colleague,    Thank you for referring your patient, Negin Jonas, to the [facility]. Please see a copy of my visit note below.    Follow up left total knee arthroplasty on 6/10/2020.   She complains of anterior right knee pain. .  Pain: minimal in left knee.        Oxford score: preop: 12          3 months: 40    Range of motion 1-125 degrees  Laxity:  no medial collateral ligament, no lateral collateral ligament.  There is mild effusion.  There is no erythema  There is mild skin adherence.    Assessment/Plan:  left total knee arthroplasty doing well.   Discontinue physical therapy.  Continue range of motion and strengthening on her own  Scar massage.  Take antibiotics 1 hour before dental work.  Return to clinic 1 year postoperative ( June ) with xrays of left knee   Oxford score in 1 year.        Again, thank you for allowing me to participate in the care of your patient.        Sincerely,        Hamilton Martinez MD

## 2020-09-22 DIAGNOSIS — K21.9 GASTROESOPHAGEAL REFLUX DISEASE WITHOUT ESOPHAGITIS: ICD-10-CM

## 2020-09-23 NOTE — TELEPHONE ENCOUNTER
Prescription approved per G Refill Protocol.    Mai Meza RN  [facility]

## 2020-11-23 ENCOUNTER — ANCILLARY PROCEDURE (OUTPATIENT)
Dept: GENERAL RADIOLOGY | Facility: CLINIC | Age: 75
End: 2020-11-23
Attending: PHYSICIAN ASSISTANT
Payer: COMMERCIAL

## 2020-11-23 ENCOUNTER — OFFICE VISIT (OUTPATIENT)
Dept: ORTHOPEDICS | Facility: CLINIC | Age: 75
End: 2020-11-23
Payer: COMMERCIAL

## 2020-11-23 VITALS
HEIGHT: 66 IN | WEIGHT: 180 LBS | DIASTOLIC BLOOD PRESSURE: 69 MMHG | BODY MASS INDEX: 28.93 KG/M2 | SYSTOLIC BLOOD PRESSURE: 186 MMHG | HEART RATE: 68 BPM | OXYGEN SATURATION: 96 %

## 2020-11-23 DIAGNOSIS — M17.0 PRIMARY OSTEOARTHRITIS OF BOTH KNEES: ICD-10-CM

## 2020-11-23 DIAGNOSIS — M17.0 PRIMARY OSTEOARTHRITIS OF BOTH KNEES: Primary | ICD-10-CM

## 2020-11-23 PROCEDURE — 20610 DRAIN/INJ JOINT/BURSA W/O US: CPT | Mod: RT | Performed by: ORTHOPAEDIC SURGERY

## 2020-11-23 PROCEDURE — 73562 X-RAY EXAM OF KNEE 3: CPT | Mod: RT | Performed by: RADIOLOGY

## 2020-11-23 RX ORDER — LIDOCAINE HYDROCHLORIDE 10 MG/ML
5 INJECTION, SOLUTION INFILTRATION; PERINEURAL
Status: DISCONTINUED | OUTPATIENT
Start: 2020-11-23 | End: 2021-03-11

## 2020-11-23 RX ORDER — METHYLPREDNISOLONE ACETATE 80 MG/ML
80 INJECTION, SUSPENSION INTRA-ARTICULAR; INTRALESIONAL; INTRAMUSCULAR; SOFT TISSUE
Status: DISCONTINUED | OUTPATIENT
Start: 2020-11-23 | End: 2021-03-11

## 2020-11-23 RX ADMIN — METHYLPREDNISOLONE ACETATE 80 MG: 80 INJECTION, SUSPENSION INTRA-ARTICULAR; INTRALESIONAL; INTRAMUSCULAR; SOFT TISSUE at 16:11

## 2020-11-23 RX ADMIN — LIDOCAINE HYDROCHLORIDE 5 ML: 10 INJECTION, SOLUTION INFILTRATION; PERINEURAL at 16:11

## 2020-11-23 ASSESSMENT — MIFFLIN-ST. JEOR: SCORE: 1330.28

## 2020-11-23 NOTE — PROGRESS NOTES
Large Joint Injection/Arthocentesis: R knee joint    Date/Time: 11/23/2020 4:11 PM  Performed by: Hamilton Martinez MD  Authorized by: Hamilton Martinez MD     Indications:  Pain  Needle Size:  22 G  Guidance: landmark guided    Location:  Knee      Medications:  80 mg methylPREDNISolone 80 MG/ML; 5 mL lidocaine 1 %  Outcome:  Tolerated well, no immediate complications  Procedure discussed: discussed risks, benefits, and alternatives    Consent Given by:  Patient  Timeout: timeout called immediately prior to procedure    Prep: patient was prepped and draped in usual sterile fashion

## 2020-11-23 NOTE — LETTER
11/23/2020         RE: Negin Jonas  [address]  [place] [address]        Dear Colleague,    Thank you for referring your patient, Negin Jonas, to the [facility]. Please see a copy of my visit note below.      Large Joint Injection/Arthocentesis: R knee joint    Date/Time: 11/23/2020 4:11 PM  Performed by: Hamilton Martinez MD  Authorized by: Hamilton Martinez MD     Indications:  Pain  Needle Size:  22 G  Guidance: landmark guided    Location:  Knee      Medications:  80 mg methylPREDNISolone 80 MG/ML; 5 mL lidocaine 1 %  Outcome:  Tolerated well, no immediate complications  Procedure discussed: discussed risks, benefits, and alternatives    Consent Given by:  Patient  Timeout: timeout called immediately prior to procedure    Prep: patient was prepped and draped in usual sterile fashion            Follow up right knee primary osteoarthritis.  Her left knee total knee arthroplasty from 6/10/2020 is doing well.  She complains of significant right knee pain.  It wakes her at night and makes it difficult to walk.  Last injection 1/14/20.  X-ray shows severe right knee osteoarthritis, but not yet bone-on-bone.    Range of motion 0-125.    Positive tenderness at medial joint line and lateral joint line.  No ligament laxity.    Assessment:  Right knee osteoarthritis.  She  desires injection today of right knee(s).  Risks, benefits, potential complications and alternatives were discussed.   With the patient's consent, sterile prep was performed of right knee(s).  Right knee was injected with Depo Medrol 80 mg and lidocaine at anteromedial site.  Return to clinic as needed.         Again, thank you for allowing me to participate in the care of your patient.        Sincerely,        Hamilton Martinez MD

## 2020-11-24 NOTE — PROGRESS NOTES
Follow up right knee primary osteoarthritis.  Her left knee total knee arthroplasty from 6/10/2020 is doing well.  She complains of significant right knee pain.  It wakes her at night and makes it difficult to walk.  Last injection 1/14/20.  X-ray shows severe right knee osteoarthritis, but not yet bone-on-bone.    Range of motion 0-125.    Positive tenderness at medial joint line and lateral joint line.  No ligament laxity.    Assessment:  Right knee osteoarthritis.  She  desires injection today of right knee(s).  Risks, benefits, potential complications and alternatives were discussed.   With the patient's consent, sterile prep was performed of right knee(s).  Right knee was injected with Depo Medrol 80 mg and lidocaine at anteromedial site.  Return to clinic as needed.

## 2020-12-20 DIAGNOSIS — K21.9 GASTROESOPHAGEAL REFLUX DISEASE WITHOUT ESOPHAGITIS: ICD-10-CM

## 2020-12-20 DIAGNOSIS — J31.0 CHRONIC RHINITIS: ICD-10-CM

## 2020-12-20 RX ORDER — FLUTICASONE PROPIONATE 50 MCG
SPRAY, SUSPENSION (ML) NASAL
Qty: 48 G | Refills: 1 | Status: SHIPPED | OUTPATIENT
Start: 2020-12-20 | End: 2021-06-15

## 2020-12-20 NOTE — TELEPHONE ENCOUNTER
"Requested Prescriptions   Pending Prescriptions Disp Refills     omeprazole (PRILOSEC) 20 MG DR capsule [Pharmacy Med Name: Omeprazole Oral Capsule Delayed Release 20 MG] 180 capsule 0     Sig: Take 2 capsules (40 mg) by mouth daily       PPI Protocol Passed - 12/20/2020  2:02 AM        Passed - Not on Clopidogrel (unless Pantoprazole ordered)        Passed - No diagnosis of osteoporosis on record        Passed - Recent (12 mo) or future (30 days) visit within the authorizing provider's specialty     Patient has had an office visit with the authorizing provider or a provider within the authorizing providers department within the previous 12 mos or has a future within next 30 days. See \"Patient Info\" tab in [employer], or \"Choose Columns\" in Meds & Orders section of the refill encounter.              Passed - Medication is active on med list        Passed - Patient is age 18 or older        Passed - No active pregnacy on record        Passed - No positive pregnancy test in past 12 months           fluticasone (FLONASE) 50 MCG/ACT nasal spray [Pharmacy Med Name: Fluticasone Propionate Nasal Suspension 50 MCG/ACT] 16 g 0     Sig: place 1 - 2 sprays into both nostrils daily For nasal allergies       Nasal Allergy Protocol Passed - 12/20/2020  2:02 AM        Passed - Patient is age 12 or older        Passed - Recent (12 mo) or future (30 days) visit within the authorizing provider's specialty     Patient has had an office visit with the authorizing provider or a provider within the authorizing providers department within the previous 12 mos or has a future within next 30 days. See \"Patient Info\" tab in [employer], or \"Choose Columns\" in Meds & Orders section of the refill encounter.              Passed - Medication is active on med list           Signed Prescriptions:                        Disp   Refills    omeprazole (PRILOSEC) 20 MG DR capsule     180 ca*1        Sig: Take 2 capsules (40 mg) by mouth daily  Authorizing " Provider: SINCERE DIZA  Ordering User: SIENNA IRVIN    fluticasone (FLONASE) 50 MCG/ACT nasal spr*48 g   1        Sig: place 1 - 2 sprays into both nostrils daily For nasal           allergies  Authorizing Provider: SINCERE DIAZ  Ordering User: SIENNA IRVIN

## 2021-01-25 ENCOUNTER — OFFICE VISIT (OUTPATIENT)
Dept: ORTHOPEDICS | Facility: CLINIC | Age: 76
End: 2021-01-25
Payer: COMMERCIAL

## 2021-01-25 VITALS
SYSTOLIC BLOOD PRESSURE: 176 MMHG | HEART RATE: 64 BPM | DIASTOLIC BLOOD PRESSURE: 84 MMHG | WEIGHT: 180 LBS | HEIGHT: 66 IN | BODY MASS INDEX: 28.93 KG/M2

## 2021-01-25 DIAGNOSIS — M17.11 PRIMARY OSTEOARTHRITIS OF RIGHT KNEE: Primary | ICD-10-CM

## 2021-01-25 PROCEDURE — 99214 OFFICE O/P EST MOD 30 MIN: CPT | Performed by: ORTHOPAEDIC SURGERY

## 2021-01-25 ASSESSMENT — KOOS JR
BENDING TO THE FLOOR TO PICK UP OBJECT: EXTREME
TWISING OR PIVOTING ON KNEE: MILD
STRAIGHTENING KNEE FULLY: MILD
RISING FROM SITTING: EXTREME
HOW SEVERE IS YOUR KNEE STIFFNESS AFTER FIRST WAKING IN MORNING: MODERATE
GOING UP OR DOWN STAIRS: EXTREME
KOOS JR SCORING: 42.28
STANDING UPRIGHT: MODERATE

## 2021-01-25 ASSESSMENT — MIFFLIN-ST. JEOR: SCORE: 1330.28

## 2021-01-25 NOTE — LETTER
1/25/2021         RE: Negin Jonas  [address]  [place] [address]        Dear Colleague,    Thank you for referring your patient, Negin Jonas, to the [facility]. Please see a copy of my visit note below.    HISTORY OF PRESENT ILLNESS    Negin Jonas is a 75 year old female who is seen in consultation at the request of Dr. Arboleda for evaluation of  right knee pain that has been present approximately 2 years.      Present symptoms: severe pain, no swelling, +catching/popping, no locking, +giving way, pain radiates to glute.    Symptoms occur getting up from seated or lying-down position , going up and down stairs and sitting down.    The symptoms occur are constant.  The symptoms are increasing  Limitations of activities of daily living:  Avoids doing things because of knee pain.  Fall risk?: Yes    Treatments tried to this point: NSAIDs, Glucosamine, Corticosteroid injections and Physical Therapy  Response to treatment:   NSAIDs: no relief   Glucosamine: unknown   Corticosteroid injections: not much relief, didn't last   viscous supplementation injection: Not done   Arthroscopy: Not done   Physical Therapy: no relief      Past Medical History:   Diagnosis Date     Advanced directives, counseling/discussion 3/7/2012     Anxiety attack 2006    EEG normal, improved with lamictal     Chronic rhinitis 1/31/2011     Closed head injury 2004    subarachnoid hemorrage/cerebral contusion with sequellae of significant diminished short term memory, word finding difficulties and emotional lability, was in coma for 2 months, on seizure prophylaxsis with lamictal, f/b neuro annually     Generalized anxiety disorder      Major depression      Major depression in complete remission (H) 1/31/2011    was seen by psychiatrist in past, has tried Effexor and Seroquel and Hiram's Wart     Migraine headache     resolved s/p closed head injury     Panic attacks      Psoriasis  1/31/2011     Short-term memory loss 2004    secondary to closed head injury       Past Surgical History:   Procedure Laterality Date     C TOTAL KNEE ARTHROPLASTY Left 06/10/2020    DML     ESOPHAGOSCOPY, GASTROSCOPY, DUODENOSCOPY (EGD), COMBINED N/A 12/17/2015    Procedure: COMBINED ESOPHAGOSCOPY, GASTROSCOPY, DUODENOSCOPY (EGD);  Surgeon: Duane, William Charles, MD;  Location: [facility]     ESOPHAGOSCOPY, GASTROSCOPY, DUODENOSCOPY (EGD), COMBINED N/A 12/17/2015    Procedure: COMBINED ESOPHAGOSCOPY, GASTROSCOPY, DUODENOSCOPY (EGD), BIOPSY SINGLE OR MULTIPLE;  Surgeon: Duane, William Charles, MD;  Location: [facility]     HC REMOVAL GALLBLADDER       HYSTERECTOMY, PAP NO LONGER INDICATED  1992    with ovaries     RELEASE CARPAL TUNNEL BILATERAL         Family History   Problem Relation Age of Onset     C.A.D. Father 70        CABG x 3      Respiratory Father         emphysema     Psychotic Disorder Father         depression     Diabetes Mother      Psychotic Disorder Mother         depression     Diabetes Maternal Grandfather      Hypertension Sister      Anxiety Disorder Sister      Respiratory Sister         emphysema       Social History     Socioeconomic History     Marital status:      Spouse name: Not on file     Number of children: Not on file     Years of education: Not on file     Highest education level: Not on file   Occupational History     Not on file   Social Needs     Financial resource strain: Not on file     Food insecurity     Worry: Not on file     Inability: Not on file     Transportation needs     Medical: Not on file     Non-medical: Not on file   Tobacco Use     Smoking status: Former Smoker     Packs/day: 1.00     Years: 25.00     Pack years: 25.00     Smokeless tobacco: Never Used     Tobacco comment: Quit 15 years ago.   Substance and Sexual Activity     Alcohol use: No     Drug use: No     Sexual activity: Never   Lifestyle     Physical activity     Days per week: Not on file     Minutes  per session: Not on file     Stress: Not on file   Relationships     Social connections     Talks on phone: Not on file     Gets together: Not on file     Attends Taoist service: Not on file     Active member of club or organization: Not on file     Attends meetings of clubs or organizations: Not on file     Relationship status: Not on file     Intimate partner violence     Fear of current or ex partner: Not on file     Emotionally abused: Not on file     Physically abused: Not on file     Forced sexual activity: Not on file   Other Topics Concern     Parent/sibling w/ CABG, MI or angioplasty before 65F 55M? Not Asked   Social History Narrative     Not on file       Current Outpatient Medications   Medication Sig Dispense Refill     aspirin (ASPIRIN ADULT LOW STRENGTH) 81 MG EC tablet Take 1 tablet (81 mg) by mouth daily 90 tablet 3     citalopram (CELEXA) 20 MG tablet Take 1 tablet (20 mg) by mouth daily 90 tablet 3     fluticasone (FLONASE) 50 MCG/ACT nasal spray place 1 - 2 sprays into both nostrils daily For nasal allergies 48 g 1     lamoTRIgine (LAMICTAL) 200 MG tablet Take 1 tablet (200 mg) by mouth daily Reported on 2/15/2017 90 tablet 3     omeprazole (PRILOSEC) 20 MG DR capsule Take 2 capsules (40 mg) by mouth daily 180 capsule 1     triamcinolone (KENALOG) 0.1 % external cream apply topically twice a day as needed for a maximum of 14 days 80 g 1       Allergies   Allergen Reactions     Codeine Sulfate        REVIEW OF SYSTEMS:  CONSTITUTIONAL:  NEGATIVE for fever, chills, change in weight, not feeling tired  SKIN:  NEGATIVE for worrisome rashes, no skin lumps, no skin ulcers and no non-healing wounds  EYES:  NEGATIVE for vision changes or irritation  ENT/MOUTH:  NEGATIVE  No hearing loss, no hoarseness, no difficulty swallowing.  RESP:  NEGATIVE for significant cough or SOB  BREAST:  NEGATIVE for masses, tenderness or discharge  CV:  NEGATIVE for chest pain, palpitations or peripheral edema  GI:   "NEGATIVE for nausea, abdominal pain, heartburn, or change in bowel habits  :  Negative   MUSCULOSKELETAL:  See HPI above  NEURO:  NEGATIVE for weakness, dizziness or paresthesias  ENDOCRINE:  NEGATIVE for temperature intolerance, skin/hair changes  HEME/ALLERGY/IMMUNE:  NEGATIVE for bleeding problems  PSYCHIATRIC:  NEGATIVE for changes in mood or affect    PHYSICAL EXAM:  Vitals: BP (!) 176/84   Pulse 64   Ht 1.664 m (5' 5.5\")   Wt 81.6 kg (180 lb)   BMI 29.50 kg/m    BMI= Body mass index is 29.5 kg/m .  Constitutional:  healthy, alert and no distress  Neuro: Alert and Oriented x 3, Sensation grossly WNL.  HEENT:  Atraumatic, EOMI  Neck:  Neck supple with no tenderness.  Psych: Affect normal   Respiratory: Breathing not labored.  Cardiovascular: normal peripheral pulses  Lymph: no adenopathy  Skin: No rashes,worrisome lesions or skin problems  Spine: straight, no straight leg raising pain.  Hips show full range of motion.  There is no tenderness over the sacro-iliac joints, sciatic notch, or greater trochanters.     KNEE EXAM:   Gait: walks with antalgic gait favoring right side  Alignment: Right: mild varus, Left:  normal   ROM: Right knee: 0 extension to 120 flexion, Left knee: 0 extension to 120 flexion  Effusion: Right: mild, Left: none  Tender: Right: medial joint line and lateral joint line, Left: none  Ligaments:  Lachman's Stable, Anterior and posterior drawer stable.  Right medial collateral ligament:  no laxity,  Lateral collateral ligament  no laxity.    Left medial collateral ligament:  no laxity, lateral collateral ligament:  no laxity.   mild pain with Varus/Valgus stress testing.    Patellofemoral joint: mild crepitations in the right patellofemoral joint.    Apprehension: negative      X-RAY:  From 11/23/2020: shows severe medial joint space narrowing and shows moderate lateral joint space narrowing  There is nearly bone-on-bone medially  by x-ray.         Impression: Right Knee: Osteoarthritis " severe     Plan: Total Knee Arthroplasty: We talked about the patient's condition and diagnosis.  Because of severe arthritis, and failure of conservative measures, I have suggested total knee arthroplasty of the right  knee(s).  I've talked in depth about the procedure and the risks, which include, but are not limited to blood loss requiring transfusion, infection, neurovascular injury, DVT, PE, continued pain, (both perioperative and persistent post-recovery pain), numbness around the wound, instability, and potential anesthetic and perioperative medical complications, and the possibility of needing additional procedures. We also talked about recovery time, which differs from patient to patient.  We've encouraged attendance at the arthroplasty class at the hospital.  Medical clearance will need to be obtained.      Special considerations: left done 6/10/2020   Femur  E  GSF minus ingrowth.  Tibia trabecular metal 4 with 10 spacer.  Patella 35.        Again, thank you for allowing me to participate in the care of your patient.        Sincerely,        Hamilton Martinez MD

## 2021-01-25 NOTE — PROGRESS NOTES
HISTORY OF PRESENT ILLNESS    Negin Jonas is a 75 year old female who is seen in consultation at the request of Dr. Arboleda for evaluation of  right knee pain that has been present approximately 2 years.      Present symptoms: severe pain, no swelling, +catching/popping, no locking, +giving way, pain radiates to glute.    Symptoms occur getting up from seated or lying-down position , going up and down stairs and sitting down.    The symptoms occur are constant.  The symptoms are increasing  Limitations of activities of daily living:  Avoids doing things because of knee pain.  Fall risk?: Yes    Treatments tried to this point: NSAIDs, Glucosamine, Corticosteroid injections and Physical Therapy  Response to treatment:   NSAIDs: no relief   Glucosamine: unknown   Corticosteroid injections: not much relief, didn't last   viscous supplementation injection: Not done   Arthroscopy: Not done   Physical Therapy: no relief      Past Medical History:   Diagnosis Date     Advanced directives, counseling/discussion 3/7/2012     Anxiety attack 2006    EEG normal, improved with lamictal     Chronic rhinitis 1/31/2011     Closed head injury 2004    subarachnoid hemorrage/cerebral contusion with sequellae of significant diminished short term memory, word finding difficulties and emotional lability, was in coma for 2 months, on seizure prophylaxsis with lamictal, f/b neuro annually     Generalized anxiety disorder      Major depression      Major depression in complete remission (H) 1/31/2011    was seen by psychiatrist in past, has tried Effexor and Seroquel and Hiram's Wart     Migraine headache     resolved s/p closed head injury     Panic attacks      Psoriasis 1/31/2011     Short-term memory loss 2004    secondary to closed head injury       Past Surgical History:   Procedure Laterality Date     C TOTAL KNEE ARTHROPLASTY Left 06/10/2020    DML     ESOPHAGOSCOPY, GASTROSCOPY, DUODENOSCOPY (EGD), COMBINED N/A 12/17/2015     Procedure: COMBINED ESOPHAGOSCOPY, GASTROSCOPY, DUODENOSCOPY (EGD);  Surgeon: Duane, William Charles, MD;  Location: [facility]     ESOPHAGOSCOPY, GASTROSCOPY, DUODENOSCOPY (EGD), COMBINED N/A 12/17/2015    Procedure: COMBINED ESOPHAGOSCOPY, GASTROSCOPY, DUODENOSCOPY (EGD), BIOPSY SINGLE OR MULTIPLE;  Surgeon: Duane, William Charles, MD;  Location: [facility]     HC REMOVAL GALLBLADDER       HYSTERECTOMY, PAP NO LONGER INDICATED  1992    with ovaries     RELEASE CARPAL TUNNEL BILATERAL         Family History   Problem Relation Age of Onset     C.A.D. Father 70        CABG x 3      Respiratory Father         emphysema     Psychotic Disorder Father         depression     Diabetes Mother      Psychotic Disorder Mother         depression     Diabetes Maternal Grandfather      Hypertension Sister      Anxiety Disorder Sister      Respiratory Sister         emphysema       Social History     Socioeconomic History     Marital status:      Spouse name: Not on file     Number of children: Not on file     Years of education: Not on file     Highest education level: Not on file   Occupational History     Not on file   Social Needs     Financial resource strain: Not on file     Food insecurity     Worry: Not on file     Inability: Not on file     Transportation needs     Medical: Not on file     Non-medical: Not on file   Tobacco Use     Smoking status: Former Smoker     Packs/day: 1.00     Years: 25.00     Pack years: 25.00     Smokeless tobacco: Never Used     Tobacco comment: Quit 15 years ago.   Substance and Sexual Activity     Alcohol use: No     Drug use: No     Sexual activity: Never   Lifestyle     Physical activity     Days per week: Not on file     Minutes per session: Not on file     Stress: Not on file   Relationships     Social connections     Talks on phone: Not on file     Gets together: Not on file     Attends Pentecostal service: Not on file     Active member of club or organization: Not on file     Attends  meetings of clubs or organizations: Not on file     Relationship status: Not on file     Intimate partner violence     Fear of current or ex partner: Not on file     Emotionally abused: Not on file     Physically abused: Not on file     Forced sexual activity: Not on file   Other Topics Concern     Parent/sibling w/ CABG, MI or angioplasty before 65F 55M? Not Asked   Social History Narrative     Not on file       Current Outpatient Medications   Medication Sig Dispense Refill     aspirin (ASPIRIN ADULT LOW STRENGTH) 81 MG EC tablet Take 1 tablet (81 mg) by mouth daily 90 tablet 3     citalopram (CELEXA) 20 MG tablet Take 1 tablet (20 mg) by mouth daily 90 tablet 3     fluticasone (FLONASE) 50 MCG/ACT nasal spray place 1 - 2 sprays into both nostrils daily For nasal allergies 48 g 1     lamoTRIgine (LAMICTAL) 200 MG tablet Take 1 tablet (200 mg) by mouth daily Reported on 2/15/2017 90 tablet 3     omeprazole (PRILOSEC) 20 MG DR capsule Take 2 capsules (40 mg) by mouth daily 180 capsule 1     triamcinolone (KENALOG) 0.1 % external cream apply topically twice a day as needed for a maximum of 14 days 80 g 1       Allergies   Allergen Reactions     Codeine Sulfate        REVIEW OF SYSTEMS:  CONSTITUTIONAL:  NEGATIVE for fever, chills, change in weight, not feeling tired  SKIN:  NEGATIVE for worrisome rashes, no skin lumps, no skin ulcers and no non-healing wounds  EYES:  NEGATIVE for vision changes or irritation  ENT/MOUTH:  NEGATIVE  No hearing loss, no hoarseness, no difficulty swallowing.  RESP:  NEGATIVE for significant cough or SOB  BREAST:  NEGATIVE for masses, tenderness or discharge  CV:  NEGATIVE for chest pain, palpitations or peripheral edema  GI:  NEGATIVE for nausea, abdominal pain, heartburn, or change in bowel habits  :  Negative   MUSCULOSKELETAL:  See HPI above  NEURO:  NEGATIVE for weakness, dizziness or paresthesias  ENDOCRINE:  NEGATIVE for temperature intolerance, skin/hair  "changes  HEME/ALLERGY/IMMUNE:  NEGATIVE for bleeding problems  PSYCHIATRIC:  NEGATIVE for changes in mood or affect    PHYSICAL EXAM:  Vitals: BP (!) 176/84   Pulse 64   Ht 1.664 m (5' 5.5\")   Wt 81.6 kg (180 lb)   BMI 29.50 kg/m    BMI= Body mass index is 29.5 kg/m .  Constitutional:  healthy, alert and no distress  Neuro: Alert and Oriented x 3, Sensation grossly WNL.  HEENT:  Atraumatic, EOMI  Neck:  Neck supple with no tenderness.  Psych: Affect normal   Respiratory: Breathing not labored.  Cardiovascular: normal peripheral pulses  Lymph: no adenopathy  Skin: No rashes,worrisome lesions or skin problems  Spine: straight, no straight leg raising pain.  Hips show full range of motion.  There is no tenderness over the sacro-iliac joints, sciatic notch, or greater trochanters.     KNEE EXAM:   Gait: walks with antalgic gait favoring right side  Alignment: Right: mild varus, Left:  normal   ROM: Right knee: 0 extension to 120 flexion, Left knee: 0 extension to 120 flexion  Effusion: Right: mild, Left: none  Tender: Right: medial joint line and lateral joint line, Left: none  Ligaments:  Lachman's Stable, Anterior and posterior drawer stable.  Right medial collateral ligament:  no laxity,  Lateral collateral ligament  no laxity.    Left medial collateral ligament:  no laxity, lateral collateral ligament:  no laxity.   mild pain with Varus/Valgus stress testing.    Patellofemoral joint: mild crepitations in the right patellofemoral joint.    Apprehension: negative      X-RAY:  From 11/23/2020: shows severe medial joint space narrowing and shows moderate lateral joint space narrowing  There is nearly bone-on-bone medially  by x-ray.         Impression: Right Knee: Osteoarthritis severe     Plan: Total Knee Arthroplasty: We talked about the patient's condition and diagnosis.  Because of severe arthritis, and failure of conservative measures, I have suggested total knee arthroplasty of the right  knee(s).  I've talked " in depth about the procedure and the risks, which include, but are not limited to blood loss requiring transfusion, infection, neurovascular injury, DVT, PE, continued pain, (both perioperative and persistent post-recovery pain), numbness around the wound, instability, and potential anesthetic and perioperative medical complications, and the possibility of needing additional procedures. We also talked about recovery time, which differs from patient to patient.  We've encouraged attendance at the arthroplasty class at the hospital.  Medical clearance will need to be obtained.      Special considerations: left done 6/10/2020   Femur  E  GSF minus ingrowth.  Tibia trabecular metal 4 with 10 spacer.  Patella 35.

## 2021-01-25 NOTE — PATIENT INSTRUCTIONS
Negin to follow up with Primary Care provider regarding elevated blood pressure.    Ms. Jonas and I talked about her condition and diagnosis.  Because of severe arthritis, and failure of conservative measures, we have decided to proceed with  total knee arthroplasty.      We have talked in depth about the procedure and the risks, which include, but are not limited to:  * blood loss possibly requiring transfusion,   * blood clots with possible pulmonary embolism  * infection or wound healing problems  * nerve damage - there will always be a bit of numbness just to outside of knee,  * vascular circulation problems  * continued pain  * instability of the knee.  * Loosening or wear  * anesthetic risks  * The possibility of needing additional procedures.      We also talked about recovery time, which differs from patient to patient.    Average 1-2 nights in the hospital.  Most people can return home at that point.  You will be in Physical Therapy for 1-2 months until you have gained full range of motion.      Preop xrays have been ordered, and medical clearance will need to be obtained.    Preop physical with primary physician is needed within 30 days of the surgery.  Nothing to eat or drink for 8 hours before surgery.  Stop blood thinners 5 days before surgery (aspirin, warfarin, anti-inflammatories).      You will need a Covid test before surgery.     Surgery schedulers will call you to schedule surgery.  If you don't get a call in the next few days, then call [phone] to schedule for [place].

## 2021-01-27 ENCOUNTER — TELEPHONE (OUTPATIENT)
Dept: ORTHOPEDICS | Facility: CLINIC | Age: 76
End: 2021-01-27

## 2021-01-27 PROBLEM — M17.11 PRIMARY OSTEOARTHRITIS OF RIGHT KNEE: Status: ACTIVE | Noted: 2021-01-27

## 2021-01-28 NOTE — TELEPHONE ENCOUNTER
Type of surgery: right total knee arthroplasty  Location of surgery: [facility]   Date of surgery: 3/10/21  Surgeon: Dr. Martinez   Pre-Op Appt Date: 2/15/21  Post-Op Appt Date: 03/23/21   Packet sent out: Surgery packet mailed to patient's home address.   Pre-cert/Authorization completed: NA  Date: 1/28/2021    Meg Gregg  Surgery Scheduler

## 2021-01-29 DIAGNOSIS — Z11.59 ENCOUNTER FOR SCREENING FOR OTHER VIRAL DISEASES: Primary | ICD-10-CM

## 2021-02-15 ENCOUNTER — OFFICE VISIT (OUTPATIENT)
Dept: FAMILY MEDICINE | Facility: CLINIC | Age: 76
End: 2021-02-15
Payer: COMMERCIAL

## 2021-02-15 VITALS
WEIGHT: 195 LBS | SYSTOLIC BLOOD PRESSURE: 132 MMHG | BODY MASS INDEX: 31.34 KG/M2 | DIASTOLIC BLOOD PRESSURE: 81 MMHG | HEART RATE: 58 BPM | HEIGHT: 66 IN | OXYGEN SATURATION: 99 % | TEMPERATURE: 98.6 F | RESPIRATION RATE: 16 BRPM

## 2021-02-15 DIAGNOSIS — M17.0 PRIMARY OSTEOARTHRITIS OF BOTH KNEES: ICD-10-CM

## 2021-02-15 DIAGNOSIS — F33.41 RECURRENT MAJOR DEPRESSIVE DISORDER, IN PARTIAL REMISSION (H): ICD-10-CM

## 2021-02-15 DIAGNOSIS — S06.9X9S TRAUMATIC BRAIN INJURY WITH LOSS OF CONSCIOUSNESS, SEQUELA (H): ICD-10-CM

## 2021-02-15 DIAGNOSIS — Z01.818 PREOP GENERAL PHYSICAL EXAM: Primary | ICD-10-CM

## 2021-02-15 DIAGNOSIS — G40.909 SEIZURE DISORDER (H): ICD-10-CM

## 2021-02-15 LAB
ERYTHROCYTE [DISTWIDTH] IN BLOOD BY AUTOMATED COUNT: 14.4 % (ref 10–15)
HCT VFR BLD AUTO: 38.5 % (ref 35–47)
HGB BLD-MCNC: 12.3 G/DL (ref 11.7–15.7)
MCH RBC QN AUTO: 27.2 PG (ref 26.5–33)
MCHC RBC AUTO-ENTMCNC: 31.9 G/DL (ref 31.5–36.5)
MCV RBC AUTO: 85 FL (ref 78–100)
PLATELET # BLD AUTO: 240 10E9/L (ref 150–450)
RBC # BLD AUTO: 4.52 10E12/L (ref 3.8–5.2)
WBC # BLD AUTO: 6.2 10E9/L (ref 4–11)

## 2021-02-15 PROCEDURE — 36415 COLL VENOUS BLD VENIPUNCTURE: CPT | Performed by: FAMILY MEDICINE

## 2021-02-15 PROCEDURE — 99214 OFFICE O/P EST MOD 30 MIN: CPT | Performed by: FAMILY MEDICINE

## 2021-02-15 PROCEDURE — 85027 COMPLETE CBC AUTOMATED: CPT | Performed by: FAMILY MEDICINE

## 2021-02-15 ASSESSMENT — ANXIETY QUESTIONNAIRES
IF YOU CHECKED OFF ANY PROBLEMS ON THIS QUESTIONNAIRE, HOW DIFFICULT HAVE THESE PROBLEMS MADE IT FOR YOU TO DO YOUR WORK, TAKE CARE OF THINGS AT HOME, OR GET ALONG WITH OTHER PEOPLE: NOT DIFFICULT AT ALL
6. BECOMING EASILY ANNOYED OR IRRITABLE: NEARLY EVERY DAY
GAD7 TOTAL SCORE: 15
1. FEELING NERVOUS, ANXIOUS, OR ON EDGE: NEARLY EVERY DAY
7. FEELING AFRAID AS IF SOMETHING AWFUL MIGHT HAPPEN: NOT AT ALL
3. WORRYING TOO MUCH ABOUT DIFFERENT THINGS: NEARLY EVERY DAY
5. BEING SO RESTLESS THAT IT IS HARD TO SIT STILL: NOT AT ALL
2. NOT BEING ABLE TO STOP OR CONTROL WORRYING: NEARLY EVERY DAY

## 2021-02-15 ASSESSMENT — MIFFLIN-ST. JEOR: SCORE: 1398.32

## 2021-02-15 ASSESSMENT — PATIENT HEALTH QUESTIONNAIRE - PHQ9
5. POOR APPETITE OR OVEREATING: NEARLY EVERY DAY
SUM OF ALL RESPONSES TO PHQ QUESTIONS 1-9: 13
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
## 2021-02-15 NOTE — PATIENT INSTRUCTIONS

## 2021-02-16 ASSESSMENT — ANXIETY QUESTIONNAIRES: GAD7 TOTAL SCORE: 15

## 2021-02-21 DIAGNOSIS — F41.1 GENERALIZED ANXIETY DISORDER: ICD-10-CM

## 2021-02-21 DIAGNOSIS — F33.41 RECURRENT MAJOR DEPRESSIVE DISORDER, IN PARTIAL REMISSION (H): ICD-10-CM

## 2021-02-22 RX ORDER — CITALOPRAM HYDROBROMIDE 20 MG/1
20 TABLET ORAL DAILY
Qty: 90 TABLET | Refills: 0 | Status: SHIPPED | OUTPATIENT
Start: 2021-02-22 | End: 2021-05-20

## 2021-02-22 NOTE — TELEPHONE ENCOUNTER
Routing refill request to provider for review/approval because:  PHQ-9 score:    PHQ 2/15/2021   PHQ-9 Total Score 13   Q9: Thoughts of better off dead/self-harm past 2 weeks Not at all       Brandi Asencio RN, [facility]

## 2021-03-08 DIAGNOSIS — Z11.59 ENCOUNTER FOR SCREENING FOR OTHER VIRAL DISEASES: ICD-10-CM

## 2021-03-08 LAB
LABORATORY COMMENT REPORT: NORMAL
SARS-COV-2 RNA RESP QL NAA+PROBE: NEGATIVE
SARS-COV-2 RNA RESP QL NAA+PROBE: NORMAL
SPECIMEN SOURCE: NORMAL
SPECIMEN SOURCE: NORMAL

## 2021-03-08 PROCEDURE — 87635 SARS-COV-2 COVID-19 AMP PRB: CPT | Performed by: ORTHOPAEDIC SURGERY

## 2021-03-09 ENCOUNTER — ANESTHESIA EVENT (OUTPATIENT)
Dept: SURGERY | Facility: CLINIC | Age: 76
End: 2021-03-09
Payer: COMMERCIAL

## 2021-03-09 ASSESSMENT — LIFESTYLE VARIABLES: TOBACCO_USE: 1

## 2021-03-09 ASSESSMENT — ENCOUNTER SYMPTOMS: SEIZURES: 1

## 2021-03-09 NOTE — ANESTHESIA PREPROCEDURE EVALUATION
Anesthesia Pre-Procedure Evaluation    Patient: Negin Jonas   MRN: [id number] : 1945        Preoperative Diagnosis: Primary osteoarthritis of right knee [M17.11]   Procedure : Procedure(s):  ARTHROPLASTY, KNEE, TOTAL     Past Medical History:   Diagnosis Date     Advanced directives, counseling/discussion 3/7/2012     Anxiety attack 2006    EEG normal, improved with lamictal     Chronic rhinitis 2011     Closed head injury 2004    subarachnoid hemorrage/cerebral contusion with sequellae of significant diminished short term memory, word finding difficulties and emotional lability, was in coma for 2 months, on seizure prophylaxsis with lamictal, f/b neuro annually     Generalized anxiety disorder      Major depression      Major depression in complete remission (H) 2011    was seen by psychiatrist in past, has tried Effexor and Seroquel and Hiram's Wart     Migraine headache     resolved s/p closed head injury     Panic attacks      Psoriasis 2011     Short-term memory loss     secondary to closed head injury      Past Surgical History:   Procedure Laterality Date     C TOTAL KNEE ARTHROPLASTY Left 06/10/2020    DML     ESOPHAGOSCOPY, GASTROSCOPY, DUODENOSCOPY (EGD), COMBINED N/A 2015    Procedure: COMBINED ESOPHAGOSCOPY, GASTROSCOPY, DUODENOSCOPY (EGD);  Surgeon: Duane, William Charles, MD;  Location: [facility]     ESOPHAGOSCOPY, GASTROSCOPY, DUODENOSCOPY (EGD), COMBINED N/A 2015    Procedure: COMBINED ESOPHAGOSCOPY, GASTROSCOPY, DUODENOSCOPY (EGD), BIOPSY SINGLE OR MULTIPLE;  Surgeon: Duane, William Charles, MD;  Location: [facility]     HC REMOVAL GALLBLADDER       HYSTERECTOMY, PAP NO LONGER INDICATED      with ovaries     RELEASE CARPAL TUNNEL BILATERAL        Allergies   Allergen Reactions     Codeine Sulfate       Social History     Tobacco Use     Smoking status: Former Smoker     Packs/day: 1.00     Years: 25.00     Pack years: 25.00     Smokeless tobacco: Never  Used     Tobacco comment: Quit 15 years ago.   Substance Use Topics     Alcohol use: No      Wt Readings from Last 1 Encounters:   02/15/21 88.5 kg (195 lb)        Anesthesia Evaluation   Pt has had prior anesthetic. Type: General and MAC.    No history of anesthetic complications       ROS/MED HX  ENT/Pulmonary:     (+) tobacco use, Past use, 25  Pack-Year Hx,      Neurologic: Comment: TBI in 2004 with short term memory loss, emotional liability, and word finding difficulties. vertigo    (+) seizures, features: Prophylaxis for seizures post TBI, without clear history of seizure disorder. ,     Cardiovascular:     (+) Dyslipidemia -----Previous cardiac testing   Echo: Date: Results:    Stress Test: Date: Results:    ECG Reviewed: Date: 1/29/12 Results:  Normal Sinus Rhythm  Cath: Date: Results:      METS/Exercise Tolerance: >4 METS    Hematologic:  - neg hematologic  ROS     Musculoskeletal:  - neg musculoskeletal ROS     GI/Hepatic:     (+) GERD,     Renal/Genitourinary:  - neg Renal ROS     Endo:  - neg endo ROS     Psychiatric/Substance Use:     (+) psychiatric history depression and anxiety (panic attacks)     Infectious Disease:  - neg infectious disease ROS     Malignancy:  - neg malignancy ROS     Other:            Physical Exam    Airway        Mallampati: II   TM distance: > 3 FB   Neck ROM: full   Mouth opening: > 3 cm    Respiratory Devices and Support         Dental  no notable dental history         Cardiovascular   cardiovascular exam normal       Rhythm and rate: regular     Pulmonary   pulmonary exam normal        breath sounds clear to auscultation           OUTSIDE LABS:  CBC:   Lab Results   Component Value Date    WBC 6.2 02/15/2021    WBC 6.8 05/29/2020    HGB 12.3 02/15/2021    HGB 13.1 05/29/2020    HCT 38.5 02/15/2021    HCT 39.8 05/29/2020     02/15/2021     05/29/2020     BMP:   Lab Results   Component Value Date     01/06/2020     09/07/2018    POTASSIUM 3.9  01/06/2020    POTASSIUM 4.8 09/07/2018    CHLORIDE 108 01/06/2020    CHLORIDE 105 09/07/2018    CO2 26 01/06/2020    CO2 31 09/07/2018    BUN 24 01/06/2020    BUN 20 09/07/2018    CR 0.73 01/06/2020    CR 0.71 09/07/2018     (H) 01/06/2020    GLC 97 09/07/2018     COAGS: No results found for: PTT, INR, FIBR  POC: No results found for: BGM, HCG, HCGS  HEPATIC:   Lab Results   Component Value Date    ALBUMIN 4.0 01/06/2020    PROTTOTAL 7.2 01/06/2020    ALT 24 01/06/2020    AST 21 01/06/2020    ALKPHOS 87 01/06/2020    BILITOTAL 0.4 01/06/2020     OTHER:   Lab Results   Component Value Date    ZAINAB 9.8 01/06/2020    TSH 2.90 09/07/2018       Anesthesia Plan    ASA Status:  2   NPO Status:  NPO Appropriate    Anesthesia Type: Spinal.   Induction: Intravenous, Propofol.   Maintenance: Balanced.        Consents    Anesthesia Plan(s) and associated risks, benefits, and realistic alternatives discussed. Questions answered and patient/representative(s) expressed understanding.     - Discussed with:  Patient      - Extended Intubation/Ventilatory Support Discussed: No.      - Patient is DNR/DNI Status: No    Use of blood products discussed: Yes.     - Discussed with: Patient.     - Consented: consented to blood products     Postoperative Care    Pain management: IV analgesics, Oral pain medications, Multi-modal analgesia, Peripheral nerve block (Single Shot).   PONV prophylaxis: Ondansetron (or other 5HT-3), Dexamethasone or Solumedrol     Comments:                Jessica Tinajero

## 2021-03-10 ENCOUNTER — ANESTHESIA (OUTPATIENT)
Dept: SURGERY | Facility: CLINIC | Age: 76
End: 2021-03-10
Payer: COMMERCIAL

## 2021-03-10 ENCOUNTER — HOSPITAL ENCOUNTER (OUTPATIENT)
Facility: CLINIC | Age: 76
Discharge: HOME | End: 2021-03-11
Attending: ORTHOPAEDIC SURGERY | Admitting: ORTHOPAEDIC SURGERY
Payer: COMMERCIAL

## 2021-03-10 ENCOUNTER — APPOINTMENT (OUTPATIENT)
Dept: PHYSICAL THERAPY | Facility: CLINIC | Age: 76
End: 2021-03-10
Attending: PHYSICIAN ASSISTANT
Payer: COMMERCIAL

## 2021-03-10 DIAGNOSIS — Z96.651 S/P TOTAL KNEE ARTHROPLASTY, RIGHT: ICD-10-CM

## 2021-03-10 DIAGNOSIS — M17.11 PRIMARY OSTEOARTHRITIS OF RIGHT KNEE: ICD-10-CM

## 2021-03-10 DIAGNOSIS — Z96.651 S/P TOTAL KNEE ARTHROPLASTY, RIGHT: Primary | ICD-10-CM

## 2021-03-10 PROBLEM — Z96.659 S/P TOTAL KNEE ARTHROPLASTY: Status: ACTIVE | Noted: 2021-03-10

## 2021-03-10 PROCEDURE — 97116 GAIT TRAINING THERAPY: CPT | Mod: GP | Performed by: PHYSICAL THERAPIST

## 2021-03-10 PROCEDURE — 999N000111 HC STATISTIC OT IP EVAL DEFER

## 2021-03-10 PROCEDURE — 250N000011 HC RX IP 250 OP 636: Performed by: ORTHOPAEDIC SURGERY

## 2021-03-10 PROCEDURE — 250N000009 HC RX 250: Performed by: NURSE ANESTHETIST, CERTIFIED REGISTERED

## 2021-03-10 PROCEDURE — 370N000017 HC ANESTHESIA TECHNICAL FEE, PER MIN: Performed by: ORTHOPAEDIC SURGERY

## 2021-03-10 PROCEDURE — 250N000025 HC SEVOFLURANE, PER MIN: Performed by: ORTHOPAEDIC SURGERY

## 2021-03-10 PROCEDURE — 250N000013 HC RX MED GY IP 250 OP 250 PS 637: Performed by: ORTHOPAEDIC SURGERY

## 2021-03-10 PROCEDURE — 250N000013 HC RX MED GY IP 250 OP 250 PS 637: Performed by: PHYSICIAN ASSISTANT

## 2021-03-10 PROCEDURE — 250N000011 HC RX IP 250 OP 636: Performed by: PHYSICIAN ASSISTANT

## 2021-03-10 PROCEDURE — 27447 TOTAL KNEE ARTHROPLASTY: CPT | Mod: AS | Performed by: PHYSICIAN ASSISTANT

## 2021-03-10 PROCEDURE — 250N000013 HC RX MED GY IP 250 OP 250 PS 637: Performed by: NURSE PRACTITIONER

## 2021-03-10 PROCEDURE — 258N000003 HC RX IP 258 OP 636: Performed by: NURSE ANESTHETIST, CERTIFIED REGISTERED

## 2021-03-10 PROCEDURE — 250N000011 HC RX IP 250 OP 636: Performed by: NURSE ANESTHETIST, CERTIFIED REGISTERED

## 2021-03-10 PROCEDURE — 272N000002 HC OR SUPPLY OTHER OPNP: Performed by: ORTHOPAEDIC SURGERY

## 2021-03-10 PROCEDURE — 97110 THERAPEUTIC EXERCISES: CPT | Mod: GP | Performed by: PHYSICAL THERAPIST

## 2021-03-10 PROCEDURE — 97530 THERAPEUTIC ACTIVITIES: CPT | Mod: GP | Performed by: PHYSICAL THERAPIST

## 2021-03-10 PROCEDURE — 27447 TOTAL KNEE ARTHROPLASTY: CPT | Mod: RT | Performed by: ORTHOPAEDIC SURGERY

## 2021-03-10 PROCEDURE — 710N000010 HC RECOVERY PHASE 1, LEVEL 2, PER MIN: Performed by: ORTHOPAEDIC SURGERY

## 2021-03-10 PROCEDURE — 272N000001 HC OR GENERAL SUPPLY STERILE: Performed by: ORTHOPAEDIC SURGERY

## 2021-03-10 PROCEDURE — 999N000141 HC STATISTIC PRE-PROCEDURE NURSING ASSESSMENT: Performed by: ORTHOPAEDIC SURGERY

## 2021-03-10 PROCEDURE — 278N000051 HC OR IMPLANT GENERAL: Performed by: ORTHOPAEDIC SURGERY

## 2021-03-10 PROCEDURE — 360N000077 HC SURGERY LEVEL 4, PER MIN: Performed by: ORTHOPAEDIC SURGERY

## 2021-03-10 PROCEDURE — 97162 PT EVAL MOD COMPLEX 30 MIN: CPT | Mod: GP | Performed by: PHYSICAL THERAPIST

## 2021-03-10 PROCEDURE — 99207 PR CONSULT E&M CHANGED TO SUBSEQUENT LEVEL: CPT | Performed by: NURSE PRACTITIONER

## 2021-03-10 PROCEDURE — 99214 OFFICE O/P EST MOD 30 MIN: CPT | Performed by: NURSE PRACTITIONER

## 2021-03-10 PROCEDURE — 258N000003 HC RX IP 258 OP 636: Performed by: PHYSICIAN ASSISTANT

## 2021-03-10 PROCEDURE — C1776 JOINT DEVICE (IMPLANTABLE): HCPCS | Performed by: ORTHOPAEDIC SURGERY

## 2021-03-10 DEVICE — BONE CEMENT SIMPLEX FULL DOSE 6191-1-001: Type: IMPLANTABLE DEVICE | Site: KNEE | Status: FUNCTIONAL

## 2021-03-10 DEVICE — IMPLANTABLE DEVICE: Type: IMPLANTABLE DEVICE | Site: KNEE | Status: FUNCTIONAL

## 2021-03-10 RX ORDER — SODIUM CHLORIDE, SODIUM LACTATE, POTASSIUM CHLORIDE, CALCIUM CHLORIDE 600; 310; 30; 20 MG/100ML; MG/100ML; MG/100ML; MG/100ML
INJECTION, SOLUTION INTRAVENOUS CONTINUOUS
Status: DISCONTINUED | OUTPATIENT
Start: 2021-03-10 | End: 2021-03-10 | Stop reason: HOSPADM

## 2021-03-10 RX ORDER — NALOXONE HYDROCHLORIDE 0.4 MG/ML
0.2 INJECTION, SOLUTION INTRAMUSCULAR; INTRAVENOUS; SUBCUTANEOUS
Status: ACTIVE | OUTPATIENT
Start: 2021-03-10 | End: 2021-03-11

## 2021-03-10 RX ORDER — POLYETHYLENE GLYCOL 3350 17 G/17G
17 POWDER, FOR SOLUTION ORAL DAILY
Status: DISCONTINUED | OUTPATIENT
Start: 2021-03-11 | End: 2021-03-11 | Stop reason: HOSPADM

## 2021-03-10 RX ORDER — OXYCODONE HYDROCHLORIDE 5 MG/1
10 TABLET ORAL EVERY 4 HOURS PRN
Status: CANCELLED | OUTPATIENT
Start: 2021-03-10

## 2021-03-10 RX ORDER — ONDANSETRON 2 MG/ML
4 INJECTION INTRAMUSCULAR; INTRAVENOUS EVERY 6 HOURS PRN
Status: CANCELLED | OUTPATIENT
Start: 2021-03-10

## 2021-03-10 RX ORDER — LIDOCAINE 40 MG/G
CREAM TOPICAL
Status: DISCONTINUED | OUTPATIENT
Start: 2021-03-10 | End: 2021-03-10 | Stop reason: HOSPADM

## 2021-03-10 RX ORDER — CELECOXIB 100 MG/1
400 CAPSULE ORAL ONCE
Status: COMPLETED | OUTPATIENT
Start: 2021-03-10 | End: 2021-03-10

## 2021-03-10 RX ORDER — PROPOFOL 10 MG/ML
INJECTION, EMULSION INTRAVENOUS CONTINUOUS PRN
Status: DISCONTINUED | OUTPATIENT
Start: 2021-03-10 | End: 2021-03-10

## 2021-03-10 RX ORDER — NALOXONE HYDROCHLORIDE 0.4 MG/ML
0.4 INJECTION, SOLUTION INTRAMUSCULAR; INTRAVENOUS; SUBCUTANEOUS
Status: ACTIVE | OUTPATIENT
Start: 2021-03-10 | End: 2021-03-11

## 2021-03-10 RX ORDER — ACETAMINOPHEN 325 MG/1
650 TABLET ORAL EVERY 4 HOURS PRN
Qty: 100 TABLET | Refills: 0 | Status: SHIPPED | OUTPATIENT
Start: 2021-03-10 | End: 2023-01-05

## 2021-03-10 RX ORDER — ACETAMINOPHEN 325 MG/1
650 TABLET ORAL EVERY 4 HOURS PRN
Status: DISCONTINUED | OUTPATIENT
Start: 2021-03-13 | End: 2021-03-11 | Stop reason: HOSPADM

## 2021-03-10 RX ORDER — AMOXICILLIN 250 MG
1 CAPSULE ORAL 2 TIMES DAILY
Status: DISCONTINUED | OUTPATIENT
Start: 2021-03-10 | End: 2021-03-11 | Stop reason: HOSPADM

## 2021-03-10 RX ORDER — HYDROMORPHONE HYDROCHLORIDE 1 MG/ML
.3-.5 INJECTION, SOLUTION INTRAMUSCULAR; INTRAVENOUS; SUBCUTANEOUS EVERY 5 MIN PRN
Status: DISCONTINUED | OUTPATIENT
Start: 2021-03-10 | End: 2021-03-10 | Stop reason: HOSPADM

## 2021-03-10 RX ORDER — HYDROXYZINE HYDROCHLORIDE 10 MG/1
10 TABLET, FILM COATED ORAL EVERY 6 HOURS PRN
Qty: 30 TABLET | Refills: 0 | Status: SHIPPED | OUTPATIENT
Start: 2021-03-10 | End: 2021-10-04

## 2021-03-10 RX ORDER — OXYCODONE HYDROCHLORIDE 5 MG/1
10 TABLET ORAL EVERY 4 HOURS PRN
Status: DISCONTINUED | OUTPATIENT
Start: 2021-03-10 | End: 2021-03-11 | Stop reason: HOSPADM

## 2021-03-10 RX ORDER — CEFAZOLIN SODIUM 2 G/100ML
2 INJECTION, SOLUTION INTRAVENOUS
Status: DISCONTINUED | OUTPATIENT
Start: 2021-03-10 | End: 2021-03-10 | Stop reason: HOSPADM

## 2021-03-10 RX ORDER — PROPOFOL 10 MG/ML
INJECTION, EMULSION INTRAVENOUS PRN
Status: DISCONTINUED | OUTPATIENT
Start: 2021-03-10 | End: 2021-03-10

## 2021-03-10 RX ORDER — ONDANSETRON 2 MG/ML
4 INJECTION INTRAMUSCULAR; INTRAVENOUS EVERY 6 HOURS PRN
Status: DISCONTINUED | OUTPATIENT
Start: 2021-03-10 | End: 2021-03-11 | Stop reason: HOSPADM

## 2021-03-10 RX ORDER — BUPIVACAINE HYDROCHLORIDE AND EPINEPHRINE 5; 5 MG/ML; UG/ML
INJECTION, SOLUTION PERINEURAL PRN
Status: DISCONTINUED | OUTPATIENT
Start: 2021-03-10 | End: 2021-03-10

## 2021-03-10 RX ORDER — ONDANSETRON 4 MG/1
4 TABLET, ORALLY DISINTEGRATING ORAL EVERY 6 HOURS PRN
Status: CANCELLED | OUTPATIENT
Start: 2021-03-10

## 2021-03-10 RX ORDER — SODIUM CHLORIDE, SODIUM LACTATE, POTASSIUM CHLORIDE, CALCIUM CHLORIDE 600; 310; 30; 20 MG/100ML; MG/100ML; MG/100ML; MG/100ML
INJECTION, SOLUTION INTRAVENOUS CONTINUOUS
Status: CANCELLED | OUTPATIENT
Start: 2021-03-10

## 2021-03-10 RX ORDER — PROCHLORPERAZINE MALEATE 5 MG
5 TABLET ORAL EVERY 6 HOURS PRN
Status: CANCELLED | OUTPATIENT
Start: 2021-03-10

## 2021-03-10 RX ORDER — OXYCODONE HYDROCHLORIDE 5 MG/1
5 TABLET ORAL EVERY 4 HOURS PRN
Status: CANCELLED | OUTPATIENT
Start: 2021-03-10

## 2021-03-10 RX ORDER — PROCHLORPERAZINE MALEATE 5 MG
5 TABLET ORAL EVERY 6 HOURS PRN
Status: DISCONTINUED | OUTPATIENT
Start: 2021-03-10 | End: 2021-03-11 | Stop reason: HOSPADM

## 2021-03-10 RX ORDER — HYDROMORPHONE HYDROCHLORIDE 1 MG/ML
0.4 INJECTION, SOLUTION INTRAMUSCULAR; INTRAVENOUS; SUBCUTANEOUS
Status: DISCONTINUED | OUTPATIENT
Start: 2021-03-10 | End: 2021-03-11 | Stop reason: HOSPADM

## 2021-03-10 RX ORDER — CEFAZOLIN SODIUM 2 G/100ML
2 INJECTION, SOLUTION INTRAVENOUS SEE ADMIN INSTRUCTIONS
Status: DISCONTINUED | OUTPATIENT
Start: 2021-03-10 | End: 2021-03-10 | Stop reason: HOSPADM

## 2021-03-10 RX ORDER — METOPROLOL TARTRATE 1 MG/ML
1-2 INJECTION, SOLUTION INTRAVENOUS EVERY 5 MIN PRN
Status: DISCONTINUED | OUTPATIENT
Start: 2021-03-10 | End: 2021-03-10 | Stop reason: HOSPADM

## 2021-03-10 RX ORDER — DOCUSATE SODIUM 100 MG/1
100 CAPSULE, LIQUID FILLED ORAL 2 TIMES DAILY
Status: DISCONTINUED | OUTPATIENT
Start: 2021-03-10 | End: 2021-03-11 | Stop reason: HOSPADM

## 2021-03-10 RX ORDER — BISACODYL 10 MG
10 SUPPOSITORY, RECTAL RECTAL DAILY PRN
Status: CANCELLED | OUTPATIENT
Start: 2021-03-10

## 2021-03-10 RX ORDER — LAMOTRIGINE 100 MG/1
200 TABLET ORAL DAILY
Status: DISCONTINUED | OUTPATIENT
Start: 2021-03-10 | End: 2021-03-11 | Stop reason: HOSPADM

## 2021-03-10 RX ORDER — CEFAZOLIN SODIUM 2 G/100ML
2 INJECTION, SOLUTION INTRAVENOUS EVERY 8 HOURS
Status: COMPLETED | OUTPATIENT
Start: 2021-03-10 | End: 2021-03-11

## 2021-03-10 RX ORDER — ASPIRIN 81 MG/1
325 TABLET ORAL DAILY
Status: CANCELLED | OUTPATIENT
Start: 2021-03-10

## 2021-03-10 RX ORDER — SODIUM CHLORIDE, SODIUM LACTATE, POTASSIUM CHLORIDE, CALCIUM CHLORIDE 600; 310; 30; 20 MG/100ML; MG/100ML; MG/100ML; MG/100ML
INJECTION, SOLUTION INTRAVENOUS CONTINUOUS
Status: DISCONTINUED | OUTPATIENT
Start: 2021-03-10 | End: 2021-03-11 | Stop reason: HOSPADM

## 2021-03-10 RX ORDER — BISACODYL 10 MG
10 SUPPOSITORY, RECTAL RECTAL DAILY PRN
Status: DISCONTINUED | OUTPATIENT
Start: 2021-03-10 | End: 2021-03-11 | Stop reason: HOSPADM

## 2021-03-10 RX ORDER — ONDANSETRON 4 MG/1
4 TABLET, ORALLY DISINTEGRATING ORAL EVERY 6 HOURS PRN
Status: DISCONTINUED | OUTPATIENT
Start: 2021-03-10 | End: 2021-03-11 | Stop reason: HOSPADM

## 2021-03-10 RX ORDER — OXYCODONE HCL 10 MG/1
10 TABLET, FILM COATED, EXTENDED RELEASE ORAL ONCE
Status: COMPLETED | OUTPATIENT
Start: 2021-03-10 | End: 2021-03-10

## 2021-03-10 RX ORDER — OXYCODONE HYDROCHLORIDE 5 MG/1
5 TABLET ORAL EVERY 4 HOURS PRN
Status: DISCONTINUED | OUTPATIENT
Start: 2021-03-10 | End: 2021-03-11 | Stop reason: HOSPADM

## 2021-03-10 RX ORDER — AMOXICILLIN 250 MG
1-2 CAPSULE ORAL 2 TIMES DAILY
Qty: 30 TABLET | Refills: 0 | Status: SHIPPED | OUTPATIENT
Start: 2021-03-10 | End: 2021-10-04

## 2021-03-10 RX ORDER — ACETAMINOPHEN 325 MG/1
975 TABLET ORAL EVERY 8 HOURS
Status: CANCELLED | OUTPATIENT
Start: 2021-03-10 | End: 2021-03-13

## 2021-03-10 RX ORDER — DEXAMETHASONE SODIUM PHOSPHATE 10 MG/ML
INJECTION, SOLUTION INTRAMUSCULAR; INTRAVENOUS PRN
Status: DISCONTINUED | OUTPATIENT
Start: 2021-03-10 | End: 2021-03-10

## 2021-03-10 RX ORDER — FENTANYL CITRATE 50 UG/ML
25-50 INJECTION, SOLUTION INTRAMUSCULAR; INTRAVENOUS
Status: DISCONTINUED | OUTPATIENT
Start: 2021-03-10 | End: 2021-03-10 | Stop reason: HOSPADM

## 2021-03-10 RX ORDER — ACETAMINOPHEN 325 MG/1
975 TABLET ORAL ONCE
Status: COMPLETED | OUTPATIENT
Start: 2021-03-10 | End: 2021-03-10

## 2021-03-10 RX ORDER — LAMOTRIGINE 100 MG/1
200 TABLET ORAL DAILY
Status: DISCONTINUED | OUTPATIENT
Start: 2021-03-11 | End: 2021-03-10

## 2021-03-10 RX ORDER — ACETAMINOPHEN 325 MG/1
975 TABLET ORAL EVERY 8 HOURS
Status: DISCONTINUED | OUTPATIENT
Start: 2021-03-10 | End: 2021-03-11 | Stop reason: HOSPADM

## 2021-03-10 RX ORDER — GLYCOPYRROLATE 0.2 MG/ML
INJECTION, SOLUTION INTRAMUSCULAR; INTRAVENOUS PRN
Status: DISCONTINUED | OUTPATIENT
Start: 2021-03-10 | End: 2021-03-10

## 2021-03-10 RX ORDER — ONDANSETRON 2 MG/ML
4 INJECTION INTRAMUSCULAR; INTRAVENOUS EVERY 30 MIN PRN
Status: DISCONTINUED | OUTPATIENT
Start: 2021-03-10 | End: 2021-03-10 | Stop reason: HOSPADM

## 2021-03-10 RX ORDER — MEPERIDINE HYDROCHLORIDE 25 MG/ML
12.5 INJECTION INTRAMUSCULAR; INTRAVENOUS; SUBCUTANEOUS EVERY 5 MIN PRN
Status: DISCONTINUED | OUTPATIENT
Start: 2021-03-10 | End: 2021-03-10 | Stop reason: HOSPADM

## 2021-03-10 RX ORDER — DOCUSATE SODIUM 100 MG/1
100 CAPSULE, LIQUID FILLED ORAL 2 TIMES DAILY
Status: CANCELLED | OUTPATIENT
Start: 2021-03-10

## 2021-03-10 RX ORDER — FENTANYL CITRATE 50 UG/ML
INJECTION, SOLUTION INTRAMUSCULAR; INTRAVENOUS PRN
Status: DISCONTINUED | OUTPATIENT
Start: 2021-03-10 | End: 2021-03-10

## 2021-03-10 RX ORDER — LIDOCAINE 40 MG/G
CREAM TOPICAL
Status: DISCONTINUED | OUTPATIENT
Start: 2021-03-10 | End: 2021-03-11 | Stop reason: HOSPADM

## 2021-03-10 RX ORDER — HYDROMORPHONE HCL IN WATER/PF 6 MG/30 ML
0.2 PATIENT CONTROLLED ANALGESIA SYRINGE INTRAVENOUS
Status: DISCONTINUED | OUTPATIENT
Start: 2021-03-10 | End: 2021-03-11 | Stop reason: HOSPADM

## 2021-03-10 RX ORDER — AMOXICILLIN 250 MG
1 CAPSULE ORAL 2 TIMES DAILY
Status: CANCELLED | OUTPATIENT
Start: 2021-03-10

## 2021-03-10 RX ORDER — ONDANSETRON 4 MG/1
4 TABLET, ORALLY DISINTEGRATING ORAL EVERY 30 MIN PRN
Status: DISCONTINUED | OUTPATIENT
Start: 2021-03-10 | End: 2021-03-10 | Stop reason: HOSPADM

## 2021-03-10 RX ORDER — ACETAMINOPHEN 325 MG/1
650 TABLET ORAL EVERY 4 HOURS PRN
Status: CANCELLED | OUTPATIENT
Start: 2021-03-13

## 2021-03-10 RX ORDER — POLYETHYLENE GLYCOL 3350 17 G/17G
17 POWDER, FOR SOLUTION ORAL DAILY
Status: CANCELLED | OUTPATIENT
Start: 2021-03-11

## 2021-03-10 RX ORDER — ONDANSETRON 2 MG/ML
INJECTION INTRAMUSCULAR; INTRAVENOUS PRN
Status: DISCONTINUED | OUTPATIENT
Start: 2021-03-10 | End: 2021-03-10

## 2021-03-10 RX ORDER — LIDOCAINE 40 MG/G
CREAM TOPICAL
Status: CANCELLED | OUTPATIENT
Start: 2021-03-10

## 2021-03-10 RX ORDER — TRANEXAMIC ACID 650 MG/1
1950 TABLET ORAL ONCE
Status: COMPLETED | OUTPATIENT
Start: 2021-03-10 | End: 2021-03-10

## 2021-03-10 RX ORDER — OXYCODONE HYDROCHLORIDE 5 MG/1
5-10 TABLET ORAL EVERY 4 HOURS PRN
Qty: 30 TABLET | Refills: 0 | Status: SHIPPED | OUTPATIENT
Start: 2021-03-10 | End: 2021-03-11

## 2021-03-10 RX ORDER — HYDRALAZINE HYDROCHLORIDE 20 MG/ML
2.5-5 INJECTION INTRAMUSCULAR; INTRAVENOUS EVERY 10 MIN PRN
Status: DISCONTINUED | OUTPATIENT
Start: 2021-03-10 | End: 2021-03-10 | Stop reason: HOSPADM

## 2021-03-10 RX ADMIN — HYDROMORPHONE HYDROCHLORIDE 0.5 MG: 1 INJECTION, SOLUTION INTRAMUSCULAR; INTRAVENOUS; SUBCUTANEOUS at 11:34

## 2021-03-10 RX ADMIN — PROPOFOL 30 MG: 10 INJECTION, EMULSION INTRAVENOUS at 10:52

## 2021-03-10 RX ADMIN — ONDANSETRON 4 MG: 2 INJECTION INTRAMUSCULAR; INTRAVENOUS at 10:22

## 2021-03-10 RX ADMIN — CEFAZOLIN SODIUM 2 G: 2 INJECTION, SOLUTION INTRAVENOUS at 08:25

## 2021-03-10 RX ADMIN — HYDROMORPHONE HYDROCHLORIDE 0.5 MG: 1 INJECTION, SOLUTION INTRAMUSCULAR; INTRAVENOUS; SUBCUTANEOUS at 08:51

## 2021-03-10 RX ADMIN — FENTANYL CITRATE 50 MCG: 50 INJECTION, SOLUTION INTRAMUSCULAR; INTRAVENOUS at 09:21

## 2021-03-10 RX ADMIN — ROCURONIUM BROMIDE 30 MG: 10 INJECTION INTRAVENOUS at 08:25

## 2021-03-10 RX ADMIN — MAGNESIUM HYDROXIDE 30 ML: 400 SUSPENSION ORAL at 22:47

## 2021-03-10 RX ADMIN — ROCURONIUM BROMIDE 20 MG: 10 INJECTION INTRAVENOUS at 09:15

## 2021-03-10 RX ADMIN — TRANEXAMIC ACID 1950 MG: 650 TABLET ORAL at 06:13

## 2021-03-10 RX ADMIN — SODIUM CHLORIDE, POTASSIUM CHLORIDE, SODIUM LACTATE AND CALCIUM CHLORIDE: 600; 310; 30; 20 INJECTION, SOLUTION INTRAVENOUS at 10:21

## 2021-03-10 RX ADMIN — OXYCODONE HYDROCHLORIDE 5 MG: 5 TABLET ORAL at 16:39

## 2021-03-10 RX ADMIN — Medication 100 MG: at 08:18

## 2021-03-10 RX ADMIN — DEXAMETHASONE SODIUM PHOSPHATE 10 MG: 10 INJECTION, SOLUTION INTRAMUSCULAR; INTRAVENOUS at 11:04

## 2021-03-10 RX ADMIN — FENTANYL CITRATE 50 MCG: 50 INJECTION, SOLUTION INTRAMUSCULAR; INTRAVENOUS at 09:17

## 2021-03-10 RX ADMIN — ACETAMINOPHEN 975 MG: 325 TABLET, FILM COATED ORAL at 16:38

## 2021-03-10 RX ADMIN — SODIUM CHLORIDE, POTASSIUM CHLORIDE, SODIUM LACTATE AND CALCIUM CHLORIDE: 600; 310; 30; 20 INJECTION, SOLUTION INTRAVENOUS at 12:47

## 2021-03-10 RX ADMIN — SUGAMMADEX 200 MG: 100 INJECTION, SOLUTION INTRAVENOUS at 10:34

## 2021-03-10 RX ADMIN — SODIUM CHLORIDE, POTASSIUM CHLORIDE, SODIUM LACTATE AND CALCIUM CHLORIDE: 600; 310; 30; 20 INJECTION, SOLUTION INTRAVENOUS at 07:34

## 2021-03-10 RX ADMIN — LAMOTRIGINE 200 MG: 100 TABLET ORAL at 16:39

## 2021-03-10 RX ADMIN — FENTANYL CITRATE 50 MCG: 50 INJECTION INTRAMUSCULAR; INTRAVENOUS at 11:25

## 2021-03-10 RX ADMIN — OXYCODONE HYDROCHLORIDE 10 MG: 10 TABLET, FILM COATED, EXTENDED RELEASE ORAL at 06:14

## 2021-03-10 RX ADMIN — DOCUSATE SODIUM 100 MG: 100 CAPSULE, LIQUID FILLED ORAL at 16:39

## 2021-03-10 RX ADMIN — PROPOFOL 150 MG: 10 INJECTION, EMULSION INTRAVENOUS at 08:18

## 2021-03-10 RX ADMIN — CEFAZOLIN SODIUM 2 G: 2 INJECTION, SOLUTION INTRAVENOUS at 16:40

## 2021-03-10 RX ADMIN — GLYCOPYRROLATE 0.2 MG: 0.2 INJECTION, SOLUTION INTRAMUSCULAR; INTRAVENOUS at 09:00

## 2021-03-10 RX ADMIN — DOCUSATE SODIUM 100 MG: 100 CAPSULE, LIQUID FILLED ORAL at 20:47

## 2021-03-10 RX ADMIN — DOCUSATE SODIUM AND SENNOSIDES 1 TABLET: 8.6; 5 TABLET ORAL at 16:40

## 2021-03-10 RX ADMIN — CELECOXIB 400 MG: 100 CAPSULE ORAL at 06:14

## 2021-03-10 RX ADMIN — ACETAMINOPHEN 975 MG: 325 TABLET, FILM COATED ORAL at 06:14

## 2021-03-10 RX ADMIN — GLYCOPYRROLATE 0.2 MG: 0.2 INJECTION, SOLUTION INTRAMUSCULAR; INTRAVENOUS at 08:51

## 2021-03-10 RX ADMIN — ASPIRIN 325 MG: 325 TABLET, COATED ORAL at 17:20

## 2021-03-10 RX ADMIN — FENTANYL CITRATE 50 MCG: 50 INJECTION INTRAMUSCULAR; INTRAVENOUS at 11:17

## 2021-03-10 RX ADMIN — Medication 20 ML: at 11:04

## 2021-03-10 RX ADMIN — FENTANYL CITRATE 50 MCG: 50 INJECTION INTRAMUSCULAR; INTRAVENOUS at 11:29

## 2021-03-10 RX ADMIN — DOCUSATE SODIUM AND SENNOSIDES 1 TABLET: 8.6; 5 TABLET ORAL at 20:47

## 2021-03-10 RX ADMIN — PROPOFOL 150 MCG/KG/MIN: 10 INJECTION, EMULSION INTRAVENOUS at 07:49

## 2021-03-10 RX ADMIN — FENTANYL CITRATE 25 MCG: 50 INJECTION, SOLUTION INTRAMUSCULAR; INTRAVENOUS at 08:14

## 2021-03-10 RX ADMIN — FENTANYL CITRATE 75 MCG: 50 INJECTION, SOLUTION INTRAMUSCULAR; INTRAVENOUS at 07:40

## 2021-03-10 RX ADMIN — PROPOFOL 30 MG: 10 INJECTION, EMULSION INTRAVENOUS at 11:01

## 2021-03-10 RX ADMIN — FENTANYL CITRATE 50 MCG: 50 INJECTION INTRAMUSCULAR; INTRAVENOUS at 11:21

## 2021-03-10 RX ADMIN — FENTANYL CITRATE 50 MCG: 50 INJECTION, SOLUTION INTRAMUSCULAR; INTRAVENOUS at 08:40

## 2021-03-10 ASSESSMENT — ACTIVITIES OF DAILY LIVING (ADL)
DIFFICULTY_EATING/SWALLOWING: NO
PATIENT_/_FAMILY_COMMUNICATION_STYLE: SPOKEN LANGUAGE (ENGLISH OR BILINGUAL)
FALL_HISTORY_WITHIN_LAST_SIX_MONTHS: NO
TOILETING_ISSUES: NO
DOING_ERRANDS_INDEPENDENTLY_DIFFICULTY: YES
DIFFICULTY_COMMUNICATING: NO
WALKING_OR_CLIMBING_STAIRS_DIFFICULTY: NO
VISION_MANAGEMENT: GLASSES
HEARING_DIFFICULTY_OR_DEAF: NO
WEAR_GLASSES_OR_BLIND: YES
DRESSING/BATHING_DIFFICULTY: NO
CONCENTRATING,_REMEMBERING_OR_MAKING_DECISIONS_DIFFICULTY: YES

## 2021-03-10 ASSESSMENT — MIFFLIN-ST. JEOR
SCORE: 1398.51
SCORE: 1382.9

## 2021-03-10 NOTE — ANESTHESIA PROCEDURE NOTES
Pre-Procedure   Staff -   CRNA: Antwan Crabtree APRN CRNA  Other Anesthesia Staff: Jessica Tinajero  Performed By: CRNA and SRNA  Location: post-op  Pre-Anesthestic Checklist: patient identified, IV checked, site marked, risks and benefits discussed, informed consent, monitors and equipment checked, pre-op evaluation, at physician/surgeon's request and post-op pain management  Timeout:  Correct Patient: Yes   Correct Procedure: Yes   Correct Site: Yes   Correct Position: Yes   Correct Laterality: Yes   Site Marked: Yes    Procedure Documentation  Procedure: Adductor canal  Patient Position:supine  Patient Prep/Sterile Barriers: sterile gloves, mask, Chloraprep  Local skin infiltrated with 3 mL of 2% lidocaine.   Needle type: insulated  Needle Gauge: 22.   Needle Length (millimeters) 100   Ultrasound guided, Ultrasound used to identify targeted nerve, plexus, vascular marker, or fascial plane and place a needle adjacent to it in real-time, Ultrasound was used to visualize the spread of anesthetic in close proximity to the above referenced structure  A permanent image is entered into the patient's record.  The nerve(s) appeared anatomically normal, There were no apparent abnormal pathologic findings    Assessment/Narrative      The placement was negative for: blood aspirated, painful injection and site bleeding  Paresthesias: No.  Test dose of mL at.   Test dose negative, 3 minutes after injection, for signs of intravascular, subdural, or intrathecal injection.  Bolus given via needle..   Secured via.   Insertion/Infusion Method: Single Shot  Complications: none  Injection made incrementally with aspirations every 5 mL.  Comments:  Placed by the SRNA under the supervision of the CRNA.  Good visualization of the femoral artery and the local spread lateral to the artery.  No HR increase with injections and no other complications noted.  I will follow up with the pt if needed.

## 2021-03-10 NOTE — CONSULTS
[facility]  Consult Note - Hospitalist Service     Date of Admission:  3/10/2021  Consult Requested by: Dr. Martinez  Reason for Consult: Medical management    Assessment & Plan   Negin Jonas is a 75 year old female with a history of TBI and seizure disorder who presented to [facility] on 3/10/21 for elective right total knee arthroplasty. Patient with history of right knee osteoarthritis with recent xray showing severe wear. Patient attempted conservative treatment but ultimately presented for elective right total knee arthoplasty which she underwent 3/10/21 with Dr. Martinez. Patient tolerated surgery well and is stable postoperatively. Hospitalist team was consulted to manage patient's chronic medical conditions including seizure disorder and depression.     Principal Problem:      Primary osteoarthritis of right knee    S/P total knee arthroplasty  Assessment: Patient presented to elective right total knee arthoplasty which was performed 3/10 with Dr. Martinez. Patient denies pain postoperatively. Denies shortness of breath and patient is maintaining oxygen saturations above 90% on room air.  Denies nausea and is tolerating oral intake. She is afebrile and hemodynamically stable  Plan:   - Further management per orthopedic surgery with anticipation of routine postoperative course  - Will defer anticoagulation to surgical team  - PT following    Active Problems:      Generalized anxiety disorder    Recurrent major depressive disorder, in partial remission (H)  Assessment: Manages with Celexa. Currently denies symptoms  Plan:   - Will continue prior to admission dose of Celexa while in hospital      Traumatic brain injury (H)    Seizure disorder (H)  Assessment: Patient with history of TBI and seizure disorder. Manages with Lamictal 200 mg daily. Patient and  decline dose for today noting they do not wish for it to interact with other medications. Recommended  patient take dose today but she can decline if she prefers.   Plan:   - Resume PTA dose of Lamictal today     The patient's care was discussed with the Patient and Patient's Family.    Chase Barnes NP  [facility]  Contact information available via [facility] Paging/Directory    ______________________________________________________________________    Chief Complaint   Right knee pain    History is obtained from the patient    History of Present Illness   Negin Jonas is a 75 year old female with a history of TBI and seizure disorder who presented to [facility] on 3/10/21 for elective right total knee arthroplasty. Patient with history of right knee osteoarthritis with recent xray showing severe wear. Patient attempted conservative treatment but ultimately presented for elective right total knee arthoplasty which she underwent 3/10/21 with Dr. Martinez. Patient tolerated surgery well and is stable postoperatively. Hospitalist team was consulted to manage patient's chronic medical conditions including seizure disorder and depression.     Review of Systems   The 10 point Review of Systems is negative other than noted in the HPI or here.     Past Medical History    I have reviewed this patient's medical history and updated it with pertinent information if needed.   Past Medical History:   Diagnosis Date     Advanced directives, counseling/discussion 3/7/2012     Anxiety attack 2006    EEG normal, improved with lamictal     Chronic rhinitis 1/31/2011     Closed head injury 2004    subarachnoid hemorrage/cerebral contusion with sequellae of significant diminished short term memory, word finding difficulties and emotional lability, was in coma for 2 months, on seizure prophylaxsis with lamictal, f/b neuro annually     Generalized anxiety disorder      Major depression      Major depression in complete remission (H) 1/31/2011    was seen by psychiatrist in past, has tried Effexor and  Seroquel and [place]'s Wart     Migraine headache     resolved s/p closed head injury     Panic attacks      Psoriasis 1/31/2011     Short-term memory loss 2004    secondary to closed head injury       Past Surgical History   I have reviewed this patient's surgical history and updated it with pertinent information if needed.  Past Surgical History:   Procedure Laterality Date     C TOTAL KNEE ARTHROPLASTY Left 06/10/2020    DML     ESOPHAGOSCOPY, GASTROSCOPY, DUODENOSCOPY (EGD), COMBINED N/A 12/17/2015    Procedure: COMBINED ESOPHAGOSCOPY, GASTROSCOPY, DUODENOSCOPY (EGD);  Surgeon: Duane, William Charles, MD;  Location: [facility]     ESOPHAGOSCOPY, GASTROSCOPY, DUODENOSCOPY (EGD), COMBINED N/A 12/17/2015    Procedure: COMBINED ESOPHAGOSCOPY, GASTROSCOPY, DUODENOSCOPY (EGD), BIOPSY SINGLE OR MULTIPLE;  Surgeon: Duane, William Charles, MD;  Location: [facility] OR     HC REMOVAL GALLBLADDER       HYSTERECTOMY, PAP NO LONGER INDICATED  1992    with ovaries     RELEASE CARPAL TUNNEL BILATERAL         Social History   I have reviewed this patient's social history and updated it with pertinent information if needed.  Social History     Tobacco Use     Smoking status: Former Smoker     Packs/day: 1.00     Years: 25.00     Pack years: 25.00     Smokeless tobacco: Never Used     Tobacco comment: Quit 15 years ago.   Substance Use Topics     Alcohol use: No     Drug use: No       Family History   I have reviewed this patient's family history and updated it with pertinent information if needed.  Family History   Problem Relation Age of Onset     C.A.D. Father 70        CABG x 3      Respiratory Father         emphysema     Psychotic Disorder Father         depression     Diabetes Mother      Psychotic Disorder Mother         depression     Diabetes Maternal Grandfather      Hypertension Sister      Anxiety Disorder Sister      Respiratory Sister         emphysema       Medications   I have reviewed this patient's current  medications    Allergies   Allergies   Allergen Reactions     Codeine Sulfate        Physical Exam   Vital Signs: Temp: 96.4  F (35.8  C) Temp src: Oral BP: 136/59 Pulse: 94   Resp: 17 SpO2: 95 % O2 Device: Nasal cannula Oxygen Delivery: 2 LPM  Weight: 193 lbs 0 oz    Constitutional: awake, alert, cooperative, no apparent distress, and appears stated age  Respiratory: No increased work of breathing, good air exchange, clear to auscultation bilaterally, no crackles or wheezing  Cardiovascular: regular rate and rhythm and normal S1 and S2  GI: normal bowel sounds, non-distended and non-tender  Skin: dressing to right knee intact with no drainage noted  Musculoskeletal: no lower extremity pitting edema present  Neurologic: Awake, alert, oriented to name, place and time; Short term memory deficit at baseline due to hx of TBI  Data   No results found for this or any previous visit (from the past 24 hour(s)).

## 2021-03-10 NOTE — PROCEDURES
Hamilton Mathew MD Physician   Procedures    -   DATE OF SERVICE:  3/10/2021    SURGEON: HAMILTON MATHEW MD   ASSISTANT:  Micheal Tanner PA-C     PREOPERATIVE DIAGNOSIS: Right knee osteoarthritis.   POSTOPERATIVE DIAGNOSIS: Right knee osteoarthritis.   PROCEDURE PERFORMED: Right total knee arthroplasty.     COMPONENTS IMPLANTED:  Tramaine      Femur CR size E GSF      trabecular metal Tibia size 4 with 10 spacer.       Patella size 35    HISTORY   This is a 75 year old  female with severe right knee osteoarthritis.   She has failed conservative treatment.   She presents now   for right total knee arthroplasty. She has motion from 0-120 degrees.     The PA's assistance was medically necessary given the technical complexity of the case; with assistance with patient positioning, retraction and knee joint exposure, limb manipulation for femoral and tibial osteotomies, assistance with implant placement, trial and final arthroplasty knee implant reduction, and wound closure.      DESCRIPTION OF PROCEDURE   After attempt at spinal anesthesia failed, a smooth General anesthetic was induced.  The patient's right leg was prepped and   draped in sterile fashion. A pause was performed for patient verification.   The leg was exsanguinated, tourniquet inflated to 300 mm Hg. A longitudinal   incision was made centered over the patella, carried down through   subcutaneous tissue. A medial parapatellar capsulotomy was performed. Fat pad   was excised. Anterior cruciate ligament excised. There was significant wear   noted on the knee, primarily tricompartmental. The Tramaine NexGen   system was utilized. An intramedullary hole was made in the femur. The anterior femur was   cut at 3 degrees external rotation, distal femur cut at 5 degrees valgus.   Sizing was performed to size  E and finishing cuts made to the size E   component.   Medial and lateral menisci were then removed with electrocautery. An intramedullary hole   was made in  the tibia and the tibia cut at 7 degrees posterior slope.  We trimmed bone prominence from the posterior femoral condyles.  Sizing   was performed to a 4 component.  With the 10 mm spacer in, we had good medial and lateral balance.    We had good balance in flexion and extension   with the 10 mm spacer.   The tibia was prepared to the 4 trabecular tibia. The patella   was osteotomized from 21 to 13 mm and fit for a 35 mm patella. Bone surfaces   were then prepared with jet lavage and drying. The trabecular tibia was impacted. The femur was impacted.  The patella was cemented in place.   Excess cement was removed and cement   hardened. After searching for all excess cement, the wound was thoroughly   irrigated.   The tourniquet was deflated, and bleeding was controlled with   electrocautery. We did not need a lateral retinacular release adjacent to the   patella. The patellar tracking was now good.  The quadriceps mechanism was closed over a medium Hemovac with interrupted #2 Polydek and #2   Vicryl suture.  A second Hemovac was placed and the subcutaneous   tissue closed with interrupted 2-0 Vicryl suture. Skin edges were closed   with a running 3-0 Monocryl subcuticular suture. Dermabond was applied.   Sterile dressings   were applied.   The patient was taken to the recovery room in stable condition.     RIYA MATHEW MD        cc:  Marlyn Arboleda     PATIENT: Negin Jonas   MR#: [id number]   : 1945   ADMIT DATE: 3/10/2021

## 2021-03-10 NOTE — OR NURSING
Transfer from  PACU to Room 273  Transferred to bed via air mat (Glyder Mat,Transfer Boar,Slider Sheet)    S: 74 y/o female  S/P right total knee replacement        Anesthesia Type:  general       Surgeon:  Dr. Martinez       Allergies:  See Medication Reconciliation Record       DNR: No  (Yes,No)    B:  Pertinent Medical History:   Past Medical History:   Diagnosis Date     Advanced directives, counseling/discussion 3/7/2012     Anxiety attack 2006    EEG normal, improved with lamictal     Chronic rhinitis 1/31/2011     Closed head injury 2004    subarachnoid hemorrage/cerebral contusion with sequellae of significant diminished short term memory, word finding difficulties and emotional lability, was in coma for 2 months, on seizure prophylaxsis with lamictal, f/b neuro annually     Generalized anxiety disorder      Major depression      Major depression in complete remission (H) 1/31/2011    was seen by psychiatrist in past, has tried Effexor and Seroquel and Hiram's Wart     Migraine headache     resolved s/p closed head injury     Panic attacks      Psoriasis 1/31/2011     Short-term memory loss 2004    secondary to closed head injury               Surgical History:    Past Surgical History:   Procedure Laterality Date     C TOTAL KNEE ARTHROPLASTY Left 06/10/2020    DML     ESOPHAGOSCOPY, GASTROSCOPY, DUODENOSCOPY (EGD), COMBINED N/A 12/17/2015    Procedure: COMBINED ESOPHAGOSCOPY, GASTROSCOPY, DUODENOSCOPY (EGD);  Surgeon: Duane, William Charles, MD;  Location: [facility]     ESOPHAGOSCOPY, GASTROSCOPY, DUODENOSCOPY (EGD), COMBINED N/A 12/17/2015    Procedure: COMBINED ESOPHAGOSCOPY, GASTROSCOPY, DUODENOSCOPY (EGD), BIOPSY SINGLE OR MULTIPLE;  Surgeon: Duane, William Charles, MD;  Location: [facility] OR     HC REMOVAL GALLBLADDER       HYSTERECTOMY, PAP NO LONGER INDICATED  1992    with ovaries     RELEASE CARPAL TUNNEL BILATERAL           A:  EBL: 275        IVF:  1400        UOP:  N/A, bladder scan 160ml        NPO:   ___Yes _X__No         Vomiting:  ___Yes __X_No         Drainage: bloody drainage, RY drain        Skin Integrity: incisions, UTV (Normal; Pressure Ulcer (Location)        RFO: __X_Yes___No  Right knee, RY drain        Brace/sling/equipment:  ___Yes__X_No (identify item if present)    Pain was initially difficult to manage.  Fentanyl did not improve pain but after one dose of dilaudid there was improvement.  Pt also received anterior block.  Unable to do spinal.  Pt denied N/V.  Tolerating ice chips.           See PACU record for ongoing assessment, vital signs and pain assessment.    R: Post-Op vitals and assessments as ordered/indicated per patient's condition.       Follow Post-Op orders and notify Physician prn.       Continue to involve patient/family in plan of care and discharge planning.       Reinforce Pre-Operative education.       Implement skin safety interventions as appropriate.    Name: Linda Berman RN on 3/10/2021 at 12:43 PM

## 2021-03-10 NOTE — PROGRESS NOTES
Negin Jonas is a 75 year old female with severe right knee osteoarthritis.  Xray shows severe wear.  She has failed conservative treatment.  Range of motion is 0-120 degrees.  Presents for right total knee arthroplasty.  This H&P has been reviewed and there are no clinically significant changes in the patient s condition.  The patient was evaluated by myself as well as (H&P provider) prior to surgery. The patient is approved for the surgery and the stated surgical procedure is still clinically indicated.  Risks, benefits, potential complications and alternatives were discussed.    Hamilton Martinez M.D.  Department of Orthopaedic Surgery  [facility]  Pager: [phone]

## 2021-03-10 NOTE — PLAN OF CARE
S-(situation): Initial OT evaluation    B-(background): Patient is a 75 year old female, day 0 status post right total knee arthroplasty by Dr. Martinez, adductor canal block and general anesthesia with 275mL EBL. Patient with a previous medical history of depression, anxiety, vertigo, panic attacks, TBI with short term memory loss and seizures. She underwent L TKA 7/2020 by Dr. Martinez.      A-(assessment): Per chart review and PT consult, Patient does not have any OT concerns.  Patient is currently denying HHPT and OPPT even with recommendations.     R-(recommendations): Complete OT order.     Thank you for your referral.     ISABELLA Adorno/L  [facility]  Occupational Therapy     Phone: [phone]  Email: [email or web address]

## 2021-03-10 NOTE — PROGRESS NOTES
Patient vital signs are at baseline: No,  Reason:   requires 1l/nc to maintain sats above 92%  Patient able to ambulate as they were prior to admission or with assist devices provided by therapies during their stay:  Yes  Patient MUST void prior to discharge:  Yes  Patient able to tolerate oral intake:  Yes  Pain has adequate pain control using Oral analgesics:  Yes

## 2021-03-10 NOTE — BRIEF OP NOTE
[facility] Brief Operative Note    Pre-operative diagnosis: Primary osteoarthritis of right knee [M17.11]   Post-operative diagnosis Right knee osteoarthritis.   Procedure: Procedure(s):  ARTHROPLASTY, KNEE, TOTAL   Surgeon(s): Surgeon(s) and Role:     * Hamilton Martinez MD - Primary   Assistant:  Micheal Tanner PA-C    Estimated blood loss: 200 mL    Specimens: * No specimens in log *   Findings: Tricompartmental osteoarthritis.

## 2021-03-10 NOTE — ANESTHESIA PROCEDURE NOTES
Airway   Date/Time: 3/10/2021 8:20 AM   Patient location during procedure: OR  Staff -   CRNA: Antwan Crabtree APRN CRNA  Other Anesthesia Staff: Jessica Tinajero  Performed By: CRNA and SRNA    Consent for Airway   Urgency: emergent    Indications and Patient Condition  Indications for airway management: bakari-procedural  Induction type:intravenousMask difficulty assessment: 0 - not attempted    Final Airway Details  Final airway type: endotracheal airway  Successful airway:ETT - single  Endotracheal Airway Details   ETT size (mm): 6.5  Cuffed: yes  Successful intubation technique: video laryngoscopy  Grade View of Cords: 1  Adjucts: stylet  Measured from: gums/teeth  Secured at (cm): 21  Secured with: plastic tape  Bite block used: Oral Airway    Post intubation assessment   Placement verified by: capnometry, equal breath sounds and chest rise   Number of attempts at approach: 1  Number of other approaches attempted: 0  Secured with:plastic tape  Ease of procedure: easy  Dentition: Intact and Unchanged

## 2021-03-10 NOTE — PROGRESS NOTES
S-(situation): Patient arrives to room 273 via cart from PACU.    B-(background): S/P R TKA (Juan)    A-(assessment): A&Ox4, on 2L O2 by NC.  Hemovac in place.  Pain controlled.    R-(recommendations): Orders reviewed with patient. Will monitor patient per MD orders.     Inpatient nursing criteria listed below were met:    Health care directives status obtained and documented: Yes  SCD's Documented: Yes  Skin issues/needs documented:Yes  Isolation addressed and Signage used: NA  Fall Prevention: Care plan updated Yes Education given and documented Yes  Care Plan initiated and Co-Morbidities added: Yes  Education Assessment documented:Yes  Admission Education Documented: Yes  If present CAUTI/CLABI Education done: NA  New medication patient education completed and documented (Possible Side Effects of Common Medications handout): Yes  Allergies Reviewed: Yes  Admission Medication Reconciliation completed: Yes  Home medications if not able to send immediately home with family stored here: NA  Reminder note placed in discharge instructions regarding home meds: NA  Individualized care needs/preferences addressed and charted: Yes

## 2021-03-10 NOTE — PROGRESS NOTES
Patient vital signs are at baseline: Yes  Patient able to ambulate as they were prior to admission or with assist devices provided by therapies during their stay:  Yes, ambulated 60 ft with PT.  Patient MUST void prior to discharge:  Yes  Patient able to tolerate oral intake:  Yes  Pain has adequate pain control using Oral analgesics:  Yes

## 2021-03-10 NOTE — PROGRESS NOTES
03/10/21 1415   Quick Adds   Type of Visit Initial PT Evaluation       Present no   Living Environment   People in home spouse   Current Living Arrangements house   Home Accessibility stairs to enter home;stairs within home   Number of Stairs, Main Entrance 2   Stair Railings, Main Entrance none   Number of Stairs, Within Home, Primary 6   Stair Railings, Within Home, Primary railing on left side (ascending);railings safe and in good condition   Transportation Anticipated family or friend will provide  (ford escape)   Living Environment Comments able to stay on one level.  able ot assist and transport. flat. walk in shower   Self-Care   Usual Activity Tolerance moderate   Current Activity Tolerance moderate   Regular Exercise No   Equipment Currently Used at Home cane, quad;walker, standard;raised toilet seat  (2WW)   Disability/Function   Hearing Difficulty or Deaf no   Wear Glasses or Blind yes   Vision Management glasses   Concentrating, Remembering or Making Decisions Difficulty yes   Concentration Management Short term memory loss from TBI 2004   Difficulty Communicating no   Difficulty Eating/Swallowing no   Walking or Climbing Stairs Difficulty no   Dressing/Bathing Difficulty no   Toileting issues no   Doing Errands Independently Difficulty (such as shopping) yes   Errands Management  helps with errands d/t patient's memory issues from TBI in 2004   Fall history within last six months no   General Information   Onset of Illness/Injury or Date of Surgery 03/10/21   Referring Physician Dr. Martinez   Patient/Family Therapy Goals Statement (PT) home with  and no follow up PT   Pertinent History of Current Problem (include personal factors and/or comorbidities that impact the POC) Patient is a 75 year old female, day 0 status post right total knee arthroplasty by Dr. Martinez, adductor canal block and general anesthesia with 275mL EBL. Patient with a previous medical  history of depression, anxiety, vertigo, panic attacks, TBI with short term memory loss and seizures. She underwent L TKA 7/2020 by Dr. Martinez.   Existing Precautions/Restrictions fall;weight bearing   Weight-Bearing Status - LUE full weight-bearing   Weight-Bearing Status - RUE full weight-bearing   Weight-Bearing Status - LLE full weight-bearing   Weight-Bearing Status - RLE weight-bearing as tolerated   General Observations PT orders: eval and treat post operative knee. Activity orders: ambulate, elevate, up in chair, ice, WBAT, RY drain.    Cognition   Orientation Status (Cognition) oriented x 4   Affect/Mental Status (Cognition) WFL   Follows Commands (Cognition) WFL   Pain Assessment   Patient Currently in Pain Yes, see Vital Sign flowsheet  (2)   Integumentary/Edema   Integumentary/Edema Comments post operative dressing CDI   Posture    Posture Forward head position   Range of Motion (ROM)   ROM Comment left knee AROM 0-50 degres   Strength   Strength Comments good quad strength   Bed Mobility   Bed Mobility supine-sit;sit-supine   Supine-Sit [place] (Bed Mobility) independent   Sit-Supine [place] (Bed Mobility) independent   Bed Mobility Limitations decreased ability to use legs for bridging/pushing   Impairments Contributing to Impaired Bed Mobility pain   Transfers   Transfers sit-stand transfer;toilet transfer   Impairments Contributing to Impaired Transfers decreased strength;pain   Sit-Stand Transfer   Sit-Stand [place] (Transfers) moderate assist (50% patient effort)   Assistive Device (Sit-Stand Transfers) walker, front-wheeled  (knee immobilizer)   Sit/Stand Transfer Comments RLE in knee immobilizer with significant difficulty to rise   Toilet Transfer   Type (Toilet Transfer) sit-stand;stand-sit   Cardwell Level (Toilet Transfer) moderate assist (50% patient effort)   Assistive Device (Toilet Transfer) walker, front-wheeled;grab bars/safety frame   Gait/Stairs (Locomotion)    [place] Level (Gait) contact guard   Assistive Device (Gait) walker, front-wheeled   Distance in Feet (Required for LE Total Joints) 60'   Pattern (Gait) step-to   Deviations/Abnormal Patterns (Gait) stride length decreased;base of support, narrow;antalgic;steppage   Maintains Weight-bearing Status (Gait) able to maintain   Comment (Gait/Stairs) poor knee control, brace migrated distally resulting in three episodes of right knee instability. good walker management   Balance   Balance Comments no LOB, safe use of walker. due to post operative status and pain a 2WW is advised for safety to prevent future falls   Sensory Examination   Sensory Perception patient reports no sensory changes   Coordination   Coordination no deficits were identified   Muscle Tone   Muscle Tone no deficits were identified   Clinical Impression   Criteria for Skilled Therapeutic Intervention yes, treatment indicated   PT Diagnosis (PT) muscle weakness, muscle tension, impaired gait, impaired balance   Influenced by the following impairments day 0 status post R TKA   Functional limitations due to impairments pain, decreased activity tolerance, use of walker for mobility   Clinical Presentation Evolving/Changing   Clinical Presentation Rationale increase pain with activity, clinical judgement, medical status, post opertive status, bleeding   Clinical Decision Making (Complexity) moderate complexity   Therapy Frequency (PT) 2x/day   Predicted Duration of Therapy Intervention (days/wks) 2   Planned Therapy Interventions (PT) bed mobility training;gait training;home exercise program;patient/family education;ROM (range of motion);strengthening;transfer training   Anticipated Equipment Needs at Discharge (PT)   (none)   Risk & Benefits of therapy have been explained care plan/treatment goals reviewed;risks/benefits reviewed;participants included;patient;spouse/significant other   Clinical Impression Comments Patient presents with signs and  symptoms consistent with post operative TKA. Patient and  with good knowledge of recovery process. Anticipate patient to make good progress towards discharge home tomorrow. Patient refusing HHPT and OPPT services. She would benefit from PT intervention to address post operative impairments to prevent future falls and return to desired level of function.   PT Discharge Planning    PT Discharge Recommendation (DC Rec) home with assist;home with home care physical therapy  (patient refusing PT services at discharge)   PT Rationale for DC Rec Lives at home with her , stairs to enter and within. Previously contralateral TKA and good knowledge of recovery process. Anticipate to discharge home tomorrow with ease. Would benefit from HHPT however refusing services from PT after discharge.    PT Brief overview of current status  MOD assist sit to stand. Stand to sit and bed mobility IND. Dependent knee immobilizer management. Ambulated 60ft with CGA and 2WW.    Total Evaluation Time   Total Evaluation Time (Minutes) 10     Thank you for your referral.  Laura Patel, PT, DPT, ATC    [facility]  O: [phone]  E: [email or web address]

## 2021-03-11 ENCOUNTER — APPOINTMENT (OUTPATIENT)
Dept: PHYSICAL THERAPY | Facility: CLINIC | Age: 76
End: 2021-03-11
Attending: ORTHOPAEDIC SURGERY
Payer: COMMERCIAL

## 2021-03-11 VITALS
WEIGHT: 193 LBS | HEART RATE: 76 BPM | RESPIRATION RATE: 18 BRPM | HEIGHT: 65 IN | BODY MASS INDEX: 32.15 KG/M2 | TEMPERATURE: 97.8 F | OXYGEN SATURATION: 95 % | DIASTOLIC BLOOD PRESSURE: 48 MMHG | SYSTOLIC BLOOD PRESSURE: 147 MMHG

## 2021-03-11 LAB
GLUCOSE BLDC GLUCOMTR-MCNC: 111 MG/DL (ref 70–99)
HGB BLD-MCNC: 9.6 G/DL (ref 11.7–15.7)

## 2021-03-11 PROCEDURE — 82962 GLUCOSE BLOOD TEST: CPT

## 2021-03-11 PROCEDURE — 97530 THERAPEUTIC ACTIVITIES: CPT | Mod: GP | Performed by: PHYSICAL THERAPIST

## 2021-03-11 PROCEDURE — 250N000013 HC RX MED GY IP 250 OP 250 PS 637: Performed by: NURSE PRACTITIONER

## 2021-03-11 PROCEDURE — 97116 GAIT TRAINING THERAPY: CPT | Mod: GP | Performed by: PHYSICAL THERAPIST

## 2021-03-11 PROCEDURE — 97110 THERAPEUTIC EXERCISES: CPT | Mod: GP | Performed by: PHYSICAL THERAPIST

## 2021-03-11 PROCEDURE — 250N000011 HC RX IP 250 OP 636: Performed by: PHYSICIAN ASSISTANT

## 2021-03-11 PROCEDURE — 36415 COLL VENOUS BLD VENIPUNCTURE: CPT | Performed by: PHYSICIAN ASSISTANT

## 2021-03-11 PROCEDURE — 85018 HEMOGLOBIN: CPT | Performed by: PHYSICIAN ASSISTANT

## 2021-03-11 PROCEDURE — 250N000013 HC RX MED GY IP 250 OP 250 PS 637: Performed by: ORTHOPAEDIC SURGERY

## 2021-03-11 PROCEDURE — 250N000013 HC RX MED GY IP 250 OP 250 PS 637: Performed by: PHYSICIAN ASSISTANT

## 2021-03-11 RX ORDER — ASPIRIN 325 MG
325 TABLET, DELAYED RELEASE (ENTERIC COATED) ORAL 2 TIMES DAILY WITH MEALS
Qty: 90 TABLET | Refills: 0 | Status: SHIPPED | OUTPATIENT
Start: 2021-03-11 | End: 2021-04-25

## 2021-03-11 RX ORDER — METOCLOPRAMIDE HYDROCHLORIDE 5 MG/ML
5 INJECTION INTRAMUSCULAR; INTRAVENOUS EVERY 6 HOURS PRN
Status: DISCONTINUED | OUTPATIENT
Start: 2021-03-11 | End: 2021-03-11 | Stop reason: HOSPADM

## 2021-03-11 RX ORDER — OXYCODONE HYDROCHLORIDE 5 MG/1
5-10 TABLET ORAL EVERY 4 HOURS PRN
Qty: 50 TABLET | Refills: 0 | Status: SHIPPED | OUTPATIENT
Start: 2021-03-11 | End: 2021-07-09

## 2021-03-11 RX ORDER — PROCHLORPERAZINE MALEATE 5 MG
5 TABLET ORAL EVERY 6 HOURS PRN
Qty: 20 TABLET | Refills: 1 | Status: SHIPPED | OUTPATIENT
Start: 2021-03-11 | End: 2022-11-12

## 2021-03-11 RX ADMIN — ACETAMINOPHEN 975 MG: 325 TABLET, FILM COATED ORAL at 12:49

## 2021-03-11 RX ADMIN — PROCHLORPERAZINE EDISYLATE 5 MG: 5 INJECTION INTRAMUSCULAR; INTRAVENOUS at 10:42

## 2021-03-11 RX ADMIN — LAMOTRIGINE 200 MG: 100 TABLET ORAL at 12:51

## 2021-03-11 RX ADMIN — ASPIRIN 325 MG: 325 TABLET, COATED ORAL at 13:19

## 2021-03-11 RX ADMIN — POLYETHYLENE GLYCOL 3350 17 G: 17 POWDER, FOR SOLUTION ORAL at 12:49

## 2021-03-11 RX ADMIN — ONDANSETRON 4 MG: 2 INJECTION INTRAMUSCULAR; INTRAVENOUS at 00:16

## 2021-03-11 RX ADMIN — DOCUSATE SODIUM 100 MG: 100 CAPSULE, LIQUID FILLED ORAL at 12:51

## 2021-03-11 RX ADMIN — ONDANSETRON 4 MG: 2 INJECTION INTRAMUSCULAR; INTRAVENOUS at 09:29

## 2021-03-11 RX ADMIN — CEFAZOLIN SODIUM 2 G: 2 INJECTION, SOLUTION INTRAVENOUS at 00:25

## 2021-03-11 RX ADMIN — DOCUSATE SODIUM AND SENNOSIDES 1 TABLET: 8.6; 5 TABLET ORAL at 12:51

## 2021-03-11 RX ADMIN — PROCHLORPERAZINE EDISYLATE 5 MG: 5 INJECTION INTRAMUSCULAR; INTRAVENOUS at 00:32

## 2021-03-11 RX ADMIN — OXYCODONE HYDROCHLORIDE 5 MG: 5 TABLET ORAL at 12:50

## 2021-03-11 NOTE — DISCHARGE SUMMARY
[facility] Discharge Summary    Negin Jonas MRN# [id number]   Age: 75 year old YOB: 1945     Date of Admission:  3/10/2021  Date of Discharge::  3/11/2021  Admitting Physician:  Hamilton Martinez MD  Discharge Physician:  Sherry Campuzano PA-C     [place] clinic: [facility]          Admission Diagnoses:   Primary osteoarthritis of right knee [M17.11]  S/P total knee arthroplasty [Z96.659]          Discharge Diagnosis:   Primary osteoarthritis of right knee [M17.11]  S/P total knee arthroplasty [Z96.659]          Procedures:   Procedure(s): Procedure(s):  ARTHROPLASTY, KNEE, TOTAL       No other procedures performed during this admission           Medications Prior to Admission:     Facility-Administered Medications Prior to Admission   Medication Dose Route Frequency Provider Last Rate Last Admin     [DISCONTINUED] lidocaine 1 % injection 5 mL  5 mL   Hamilton Martinez MD   5 mL at 11/23/20 1611     [DISCONTINUED] lidocaine 1 % injection 5 mL  5 mL   Hamilton Martinez MD   5 mL at 01/14/20 0942     [DISCONTINUED] lidocaine 1 % injection 5 mL  5 mL   Hamilton Martinez MD   5 mL at 01/14/20 0942     [DISCONTINUED] lidocaine 1 % injection 5 mL  5 mL   Hamilton Martinez MD   5 mL at 12/03/19 0818     [DISCONTINUED] lidocaine 1 % injection 5 mL  5 mL   Hamilton Martinez MD   5 mL at 12/03/19 0818     [DISCONTINUED] methylPREDNISolone (DEPO-MEDROL) injection 80 mg  80 mg   Hamilton Martinez MD   80 mg at 11/23/20 1611     [DISCONTINUED] methylPREDNISolone (DEPO-MEDROL) injection 80 mg  80 mg   Hamilton Martinez MD   80 mg at 01/14/20 0942     [DISCONTINUED] methylPREDNISolone (DEPO-MEDROL) injection 80 mg  80 mg   Hamilton Martinez MD   80 mg at 01/14/20 0942     [DISCONTINUED] methylPREDNISolone (DEPO-MEDROL) injection 80 mg  80 mg   Hamilton Martinez MD   80 mg at 12/03/19 0818     [DISCONTINUED] methylPREDNISolone (DEPO-MEDROL)  injection 80 mg  80 mg   Hamilton Martinez MD   80 mg at 12/03/19 0818     Medications Prior to Admission   Medication Sig Dispense Refill Last Dose     citalopram (CELEXA) 20 MG tablet Take 1 tablet (20 mg) by mouth daily 90 tablet 0 3/9/2021 at 0800     lamoTRIgine (LAMICTAL) 200 MG tablet Take 1 tablet (200 mg) by mouth daily Reported on 2/15/2017 90 tablet 3 3/9/2021 at 0800     omeprazole (PRILOSEC) 20 MG DR capsule Take 2 capsules (40 mg) by mouth daily 180 capsule 1 3/9/2021 at 0800     triamcinolone (KENALOG) 0.1 % external cream apply topically twice a day as needed for a maximum of 14 days 80 g 1 Past Week at Unknown time     fluticasone (FLONASE) 50 MCG/ACT nasal spray place 1 - 2 sprays into both nostrils daily For nasal allergies 48 g 1 Unknown at Unknown time     [DISCONTINUED] aspirin (ASPIRIN ADULT LOW STRENGTH) 81 MG EC tablet Take 1 tablet (81 mg) by mouth daily 90 tablet 3 3/9/2021 at 0800     @PTA@          Discharge Medications:     Current Discharge Medication List      START taking these medications    Details   oxyCODONE (ROXICODONE) 5 MG tablet Take 1-2 tablets (5-10 mg) by mouth every 4 hours as needed  Qty: 50 tablet, Refills: 0    Associated Diagnoses: S/P total knee arthroplasty, right      prochlorperazine (COMPAZINE) 5 MG tablet Take 1 tablet (5 mg) by mouth every 6 hours as needed for nausea or vomiting  Qty: 20 tablet, Refills: 1    Associated Diagnoses: S/P total knee arthroplasty, right         CONTINUE these medications which have CHANGED    Details   aspirin (ASA) 325 MG EC tablet Take 1 tablet (325 mg) by mouth 2 times daily (with meals)  Qty: 90 tablet, Refills: 0    Associated Diagnoses: S/P total knee arthroplasty, right         CONTINUE these medications which have NOT CHANGED    Details   acetaminophen (TYLENOL) 325 MG tablet Take 2 tablets (650 mg) by mouth every 4 hours as needed for other (mild pain)  Qty: 100 tablet, Refills: 0    Associated Diagnoses: S/P total  knee arthroplasty, right      citalopram (CELEXA) 20 MG tablet Take 1 tablet (20 mg) by mouth daily  Qty: 90 tablet, Refills: 0    Associated Diagnoses: Generalized anxiety disorder; Recurrent major depressive disorder, in partial remission (H)      lamoTRIgine (LAMICTAL) 200 MG tablet Take 1 tablet (200 mg) by mouth daily Reported on 2/15/2017  Qty: 90 tablet, Refills: 3    Comments: neurologist  Associated Diagnoses: Seizure disorder (H)      omeprazole (PRILOSEC) 20 MG DR capsule Take 2 capsules (40 mg) by mouth daily  Qty: 180 capsule, Refills: 1    Associated Diagnoses: Gastroesophageal reflux disease without esophagitis      triamcinolone (KENALOG) 0.1 % external cream apply topically twice a day as needed for a maximum of 14 days  Qty: 80 g, Refills: 1    Associated Diagnoses: Psoriasis      fluticasone (FLONASE) 50 MCG/ACT nasal spray place 1 - 2 sprays into both nostrils daily For nasal allergies  Qty: 48 g, Refills: 1    Associated Diagnoses: Chronic rhinitis      hydrOXYzine (ATARAX) 10 MG tablet Take 1 tablet (10 mg) by mouth every 6 hours as needed for itching or anxiety (with pain, moderate pain)  Qty: 30 tablet, Refills: 0    Associated Diagnoses: S/P total knee arthroplasty, right      senna-docusate (SENOKOT-S/PERICOLACE) 8.6-50 MG tablet Take 1-2 tablets by mouth 2 times daily Take while on oral narcotics to prevent or treat constipation.  Qty: 30 tablet, Refills: 0    Comments: While taking narcotics  Associated Diagnoses: S/P total knee arthroplasty, right                   Consultations:   hospitalist consultations were requested during this admission          Brief History of Illness:   Reason for admission requiring a surgical or invasive procedure:   Primary osteoarthritis of right knee [M17.11]  S/P total knee arthroplasty [Z96.659]   The patient underwent the following procedure(s):   Procedure(s):  ARTHROPLASTY, KNEE, TOTAL right   There were no immediate complications during this  procedure.    Please refer to the full operative summary for details.           Hospital Course:   The patient's hospital course was unremarkable.  She recovered as anticipated and experienced no post-operative complications. Hgb remained stable.  Lowest Hgb was 12.3.  Patient tolerating oral pain medications.  Patient is having nausea and vomiting unrelated to medications given.  Zofran does not relieve her nausea but Compazine did so we will prescribe this outpatient.  Patient is currently utilizing knee immobilizer.  She is able to perform a straight leg raise.  Per notes patient is declining physical therapy as she is familiar with the routine as her  had previous total knee as well as she has had the other knee done.  She is agreeable to 2 weeks in-home physical therapy services.  Tolerating therapy.  Patient reports very little pain at present.  Likely the block is still active.  Postoperative drain was removed.  Drain sites were reinforced with a compressive dressing.    # Discharge Pain Plan:   - During hospitalization, patient experienced pain due to total joint replacement.  The pain plan for discharge was discussed and plan created in a collaborative fashion.    - Opioids prescribed on discharge: oxycodone  - Duration of opioids after discharge: per rehab need  - Bowel regimen: senna         Discharge Instructions and Follow-Up:   Discharge diet: Pre-procedure diet or regular   Discharge activity: Activity as tolerated  Driving restrictions: no driving while taking narcotic analgesics  Weight bearing status: Weight bearing as tolerated  Can discontinue knee immobilizer when able to control right leg   Discharge follow-up: Follow up with Dr. Martinez in 10-14 days.  Patient should already have an appointment scheduled   Wound care: Aquacell dressing in place - OK to shower over this  Aquacell to be removed in 10 days or at follow-up           Discharge Disposition:   Discharged to home with home  physical therapy      Attestation:  I have reviewed today's vital signs, notes, medications, labs and imaging.    Sherry Campuzano PA-C

## 2021-03-11 NOTE — PLAN OF CARE
"States pain is \"pretty good\" and denies needing anything. Ate 100% of supper. Good CMS to right leg and ice to incisional site. Dressing CDI. Stopped IV fluids as has voided and is taking oral fluids well. Continues to need a bit of oxygen (1.5 liters) to maintain sats > 92%. Emptied 150ml bright red bloody drainage from Hemovac at 1730 for a total of 550ml  (200 ml EBL and another 350ml out since then). Charge nurse aware. VSS. States she feels like she will need to void again soon. Will continue to monitor CMS, dressings, vitals, pain, I&O, sats, activity tolerance.   "

## 2021-03-11 NOTE — PROGRESS NOTES
Patient vital signs are at baseline: No,  Reason:  Requires 1L NC to maintain SpO2>92%.   Patient able to ambulate as they were prior to admission or with assist devices provided by therapies during their stay:  Yes  Patient MUST void prior to discharge:  Yes  Patient able to tolerate oral intake:  No,  Reason:  Emesis x2 overnight with intermittent nausea following dose of zofran and compazine  Pain has adequate pain control using Oral analgesics:  Yes

## 2021-03-11 NOTE — PROGRESS NOTES
Patient vital signs are at baseline: No,  Reason:  Requires 1L NC to maintain SpO2 >92%  Patient able to ambulate as they were prior to admission or with assist devices provided by therapies during their stay:  Yes  Patient MUST void prior to discharge:  Yes  Patient able to tolerate oral intake:  Yes  Pain has adequate pain control using Oral analgesics:  Yes

## 2021-03-11 NOTE — PLAN OF CARE
Physical Therapy Discharge Summary    Reason for therapy discharge:    Discharged to home.    Progress towards therapy goal(s). See goals on Care Plan in [facility] electronic health record for goal details.  Goals met    Therapy recommendation(s):    Continued therapy is recommended.  Rationale/Recommendations:   . Patient would benefit from continued skilled therapeutic intervention in order to progress them towards a higher level of function in accordance with their surgeon's protocol. Encouraged patient to reach out to surgeon should she need PT once she is home due to balance, weakness, range of motion impairments.    Thank you for your referral.  Laura Patel, PT, DPT, ATC    [facility]  O: [phone]  E: [email or web address]

## 2021-03-11 NOTE — ANESTHESIA POSTPROCEDURE EVALUATION
Patient: Negin Jonas    Procedure(s):  ARTHROPLASTY, KNEE, TOTAL    Diagnosis:Primary osteoarthritis of right knee [M17.11]  Diagnosis Additional Information: No value filed.    Anesthesia Type:  Spinal    Note:  Disposition: Outpatient   Postop Pain Control: Uneventful            Sign Out: Well controlled pain   PONV: No   Neuro/Psych: Uneventful            Sign Out: Acceptable/Baseline neuro status   Airway/Respiratory: Uneventful            Sign Out: Acceptable/Baseline resp. status   CV/Hemodynamics: Uneventful            Sign Out: Acceptable CV status   Other NRE: NONE   DID A NON-ROUTINE EVENT OCCUR? No    Event details/Postop Comments:  Pt was happy with anesthesia care.  No complications.  She was discharged from the hospital so I reached her on her 's cell phone.   I will follow up with the pt if needed.         Last vitals:  Vitals:    03/10/21 1944 03/10/21 2220 03/11/21 0727   BP: 129/52 129/63 (!) 147/48   Pulse: 99 94 76   Resp: 18 20 18   Temp: 98  F (36.7  C) 98.6  F (37  C) 97.8  F (36.6  C)   SpO2: 96% 93% 95%       Last vitals prior to Anesthesia Care Transfer:  CRNA VITALS  3/10/2021 1018 - 3/10/2021 1118      3/10/2021             Pulse:  104    SpO2:  100 %    Resp Rate (observed):  (!) 5          Electronically Signed By: RANDALL Fraga CRNA  March 11, 2021  1:56 PM

## 2021-03-11 NOTE — CONSULTS
Per inpatient physical therapy:  Patient is refusing all outpatient or home P/T at discharge.  No identified needs from Care Management at this time.    MYRTLE Manuel  [facility] [phone]/ [facility] [phone]  Care Management

## 2021-03-22 ENCOUNTER — OFFICE VISIT (OUTPATIENT)
Dept: ORTHOPEDICS | Facility: CLINIC | Age: 76
End: 2021-03-22
Payer: COMMERCIAL

## 2021-03-22 VITALS
BODY MASS INDEX: 32.15 KG/M2 | SYSTOLIC BLOOD PRESSURE: 145 MMHG | HEIGHT: 65 IN | DIASTOLIC BLOOD PRESSURE: 82 MMHG | WEIGHT: 193 LBS

## 2021-03-22 DIAGNOSIS — Z96.651 HISTORY OF TOTAL RIGHT KNEE REPLACEMENT: ICD-10-CM

## 2021-03-22 PROCEDURE — 99024 POSTOP FOLLOW-UP VISIT: CPT | Performed by: ORTHOPAEDIC SURGERY

## 2021-03-22 RX ORDER — OXYCODONE HYDROCHLORIDE 5 MG/1
5 TABLET ORAL EVERY 6 HOURS PRN
Qty: 30 TABLET | Refills: 0 | Status: SHIPPED | OUTPATIENT
Start: 2021-03-22 | End: 2021-03-30

## 2021-03-22 ASSESSMENT — MIFFLIN-ST. JEOR: SCORE: 1382.9

## 2021-03-22 NOTE — LETTER
3/22/2021         RE: Negin Jonas  [address]  [place] [address]        Dear Colleague,    Thank you for referring your patient, Negin Jonas, to the [facility]. Please see a copy of my visit note below.    Follow up right total knee arthroplasty on 3/10/21.   She complains of significant pain.  Pain: moderate  Wound is healing well.   Erythema: mild lower knee  Increased warmth: none   Tenderness: moderate  Effusion: mild  Range of motion 0-95 degrees  Stability: good    Xray: today's images were reviewed with the patient.  This shows excellent position of total knee arthroplasty components    Assessment/Plan: right total knee arthroplasty doing well.  significant pain.     Use TEDS for 2 more weeks.  They may be off at night.  Use aspirin for 4 more weeks.  Start scar massage with vitamin-E cream.   Return to clinic 4 weeks.    Medication renewal: oxycodone #30          Again, thank you for allowing me to participate in the care of your patient.        Sincerely,        Hamilton Martinez MD

## 2021-03-22 NOTE — PROGRESS NOTES
Follow up right total knee arthroplasty on 3/10/21.   She complains of significant pain.  Pain: moderate  Wound is healing well.   Erythema: mild lower knee  Increased warmth: none   Tenderness: moderate  Effusion: mild  Range of motion 0-95 degrees  Stability: good    Xray: today's images were reviewed with the patient.  This shows excellent position of total knee arthroplasty components    Assessment/Plan: right total knee arthroplasty doing well.  significant pain.     Use TEDS for 2 more weeks.  They may be off at night.  Use aspirin for 4 more weeks.  Start scar massage with vitamin-E cream.   Return to clinic 4 weeks.    Medication renewal: oxycodone #30

## 2021-03-22 NOTE — PATIENT INSTRUCTIONS
Negin to follow up with Primary Care provider regarding elevated blood pressure.     right total knee arthroplasty doing well.  significant pain.     Use TEDS for 2 more weeks.  They may be off at night.  Use aspirin for 4 more weeks.  Start scar massage with vitamin-E cream.   Return to clinic 4 weeks.    Medication renewal: oxycodone #30

## 2021-04-19 ENCOUNTER — OFFICE VISIT (OUTPATIENT)
Dept: ORTHOPEDICS | Facility: CLINIC | Age: 76
End: 2021-04-19
Payer: COMMERCIAL

## 2021-04-19 VITALS
RESPIRATION RATE: 16 BRPM | DIASTOLIC BLOOD PRESSURE: 77 MMHG | SYSTOLIC BLOOD PRESSURE: 156 MMHG | HEART RATE: 85 BPM | WEIGHT: 193 LBS | BODY MASS INDEX: 32.15 KG/M2 | HEIGHT: 65 IN

## 2021-04-19 DIAGNOSIS — Z96.651 STATUS POST TOTAL RIGHT KNEE REPLACEMENT: Primary | ICD-10-CM

## 2021-04-19 PROCEDURE — 99024 POSTOP FOLLOW-UP VISIT: CPT | Performed by: ORTHOPAEDIC SURGERY

## 2021-04-19 ASSESSMENT — MIFFLIN-ST. JEOR: SCORE: 1381.32

## 2021-04-19 NOTE — PROGRESS NOTES
Follow up right total knee arthroplasty on 3/10/21.  She complains of occasional sharp pain. .    Pain: mild  Range of motion 0-120 degrees  Good stability.  There is no effusion.  There is no erythema.  Minimal scar adherence.    Assessment/Plan:  Doing well status post right total knee arthroplasty.  Scar massage.  Return to clinic 6 weeks to check Freeborn score.  Medication renewal:.  None # .  In spring 2022 we will obtain bilateral knee x-ray.

## 2021-04-19 NOTE — LETTER
4/19/2021         RE: Negin Jonas  [address]  [place] [address]        Dear Colleague,    Thank you for referring your patient, Negin Jonas, to the [facility]. Please see a copy of my visit note below.    Follow up right total knee arthroplasty on 3/10/21.  She complains of occasional sharp pain. .    Pain: mild  Range of motion 0-120 degrees  Good stability.  There is no effusion.  There is no erythema.  Minimal scar adherence.    Assessment/Plan:  Doing well status post right total knee arthroplasty.  Scar massage.  Return to clinic 6 weeks to check Mohave score.  Medication renewal:.  None # .  In spring 2022 we will obtain bilateral knee x-ray.        Again, thank you for allowing me to participate in the care of your patient.        Sincerely,        Hamilton Martinez MD

## 2021-05-20 DIAGNOSIS — F41.1 GENERALIZED ANXIETY DISORDER: ICD-10-CM

## 2021-05-20 DIAGNOSIS — F33.41 RECURRENT MAJOR DEPRESSIVE DISORDER, IN PARTIAL REMISSION (H): ICD-10-CM

## 2021-05-20 RX ORDER — CITALOPRAM HYDROBROMIDE 20 MG/1
20 TABLET ORAL DAILY
Qty: 90 TABLET | Refills: 0 | Status: SHIPPED | OUTPATIENT
Start: 2021-05-20 | End: 2021-07-09

## 2021-05-20 NOTE — TELEPHONE ENCOUNTER
Routing refill request to provider for review/approval because:  PHQ 5/9/2019 1/7/2020 2/15/2021   PHQ-9 Total Score 9 1 13   Q9: Thoughts of better off dead/self-harm past 2 weeks Not at all Not at all Not at all     Katia Benitez BSN, RN

## 2021-05-28 ENCOUNTER — RECORDS - HEALTHEAST (OUTPATIENT)
Dept: ADMINISTRATIVE | Facility: CLINIC | Age: 76
End: 2021-05-28

## 2021-06-15 DIAGNOSIS — J31.0 CHRONIC RHINITIS: ICD-10-CM

## 2021-06-15 DIAGNOSIS — K21.9 GASTROESOPHAGEAL REFLUX DISEASE WITHOUT ESOPHAGITIS: ICD-10-CM

## 2021-06-15 RX ORDER — FLUTICASONE PROPIONATE 50 MCG
SPRAY, SUSPENSION (ML) NASAL
Qty: 48 G | Refills: 1 | Status: SHIPPED | OUTPATIENT
Start: 2021-06-15 | End: 2021-12-08

## 2021-06-18 ENCOUNTER — NURSE TRIAGE (OUTPATIENT)
Dept: FAMILY MEDICINE | Facility: CLINIC | Age: 76
End: 2021-06-18

## 2021-06-18 ENCOUNTER — OFFICE VISIT (OUTPATIENT)
Dept: URGENT CARE | Facility: URGENT CARE | Age: 76
End: 2021-06-18
Payer: COMMERCIAL

## 2021-06-18 VITALS
HEART RATE: 79 BPM | OXYGEN SATURATION: 97 % | RESPIRATION RATE: 14 BRPM | SYSTOLIC BLOOD PRESSURE: 165 MMHG | DIASTOLIC BLOOD PRESSURE: 63 MMHG | TEMPERATURE: 98.6 F

## 2021-06-18 DIAGNOSIS — R05.8 SPASMODIC COUGH: Primary | ICD-10-CM

## 2021-06-18 DIAGNOSIS — Z96.653 H/O TOTAL KNEE REPLACEMENT, BILATERAL: ICD-10-CM

## 2021-06-18 LAB
SARS-COV-2 RNA RESP QL NAA+PROBE: NORMAL
SPECIMEN SOURCE: NORMAL

## 2021-06-18 PROCEDURE — 87798 DETECT AGENT NOS DNA AMP: CPT | Performed by: PHYSICIAN ASSISTANT

## 2021-06-18 PROCEDURE — U0005 INFEC AGEN DETEC AMPLI PROBE: HCPCS | Performed by: PHYSICIAN ASSISTANT

## 2021-06-18 PROCEDURE — 99214 OFFICE O/P EST MOD 30 MIN: CPT | Performed by: PHYSICIAN ASSISTANT

## 2021-06-18 PROCEDURE — U0003 INFECTIOUS AGENT DETECTION BY NUCLEIC ACID (DNA OR RNA); SEVERE ACUTE RESPIRATORY SYNDROME CORONAVIRUS 2 (SARS-COV-2) (CORONAVIRUS DISEASE [COVID-19]), AMPLIFIED PROBE TECHNIQUE, MAKING USE OF HIGH THROUGHPUT TECHNOLOGIES AS DESCRIBED BY CMS-2020-01-R: HCPCS | Performed by: PHYSICIAN ASSISTANT

## 2021-06-18 RX ORDER — BENZONATATE 100 MG/1
100 CAPSULE ORAL 3 TIMES DAILY PRN
Qty: 30 CAPSULE | Refills: 0 | Status: SHIPPED | OUTPATIENT
Start: 2021-06-18 | End: 2021-06-28

## 2021-06-18 RX ORDER — AZITHROMYCIN 250 MG/1
TABLET, FILM COATED ORAL
Qty: 6 TABLET | Refills: 0 | Status: SHIPPED | OUTPATIENT
Start: 2021-06-18 | End: 2021-07-09

## 2021-06-18 NOTE — PROGRESS NOTES
Chief Complaint   Patient presents with     Cough     had a dry cough for about a week-had her J&J vaccine              Differential Diagnosis:  URI Adult/Peds:  Acute right otitis media, Acute left otitis media, Allergic rhinitis, Asthma, Pneumonia and Viral upper respiratory illness, covid, pertussis          ASSESSMENT:     ICD-10-CM    1. Spasmodic cough  R05 azithromycin (ZITHROMAX Z-PASCUAL) 250 MG tablet     benzonatate (TESSALON) 100 MG capsule     B. pertussis/parapertussis PCR-NP     Symptomatic COVID-19 Virus (Coronavirus) by PCR   2. H/O total knee replacement, bilateral  Z96.653            PLAN: Vital signs are stable.  Spasmatic cough.  No concerning signs or symptoms at this time for pulmonary embolism or DVT.  Suspect pertussis.  Will treat with azithromycin.  Also treat with Tessalon Perles.  Covid and pertussis test pending.  Follow-up with primary next week.  I have discussed clinical findings with patient.  Side effects of medications discussed.  Symptomatic care is discussed.  I have discussed the possibility of  worsening symptoms and indication to RTC or go to the ER if they occur.  All questions are answered, patient indicates understanding of these issues and is in agreement with plan.   Patient care instructions are discussed/given at the end of visit.   Lots of rest and fluids.      Patricia Ross PA-C      SUBJECTIVE:  76-year-old female presents for spasmodic dry cough for 1 week.  Has coughing Chidi is to the point where she almost vomits or turns blue in the face.  No fever.  No sore throat.  Past medical history significant for bilateral total knee replacements.  No chest pain or shortness of breath.  No lower extremity edema.  Tdap was in 2017.  Had Covid vaccination approximately 2 months ago.      Allergies   Allergen Reactions     Codeine Sulfate        Past Medical History:   Diagnosis Date     Advanced directives, counseling/discussion 3/7/2012     Anxiety attack 2006    EEG  normal, improved with lamictal     Chronic rhinitis 1/31/2011     Closed head injury 2004    subarachnoid hemorrage/cerebral contusion with sequellae of significant diminished short term memory, word finding difficulties and emotional lability, was in coma for 2 months, on seizure prophylaxsis with lamictal, f/b neuro annually     Generalized anxiety disorder      Major depression      Major depression in complete remission (H) 1/31/2011    was seen by psychiatrist in past, has tried Effexor and Seroquel and Hiram's Wart     Migraine headache     resolved s/p closed head injury     Panic attacks      Psoriasis 1/31/2011     Short-term memory loss 2004    secondary to closed head injury       acetaminophen (TYLENOL) 325 MG tablet, Take 2 tablets (650 mg) by mouth every 4 hours as needed for other (mild pain)  citalopram (CELEXA) 20 MG tablet, Take 1 tablet (20 mg) by mouth daily  fluticasone (FLONASE) 50 MCG/ACT nasal spray, use 1 - 2 sprays into both nostrils daily For nasal allergies  hydrOXYzine (ATARAX) 10 MG tablet, Take 1 tablet (10 mg) by mouth every 6 hours as needed for itching or anxiety (with pain, moderate pain)  lamoTRIgine (LAMICTAL) 200 MG tablet, Take 1 tablet (200 mg) by mouth daily Reported on 2/15/2017  omeprazole (PRILOSEC) 20 MG DR capsule, Take 2 capsules (40 mg) by mouth daily  oxyCODONE (ROXICODONE) 5 MG tablet, Take 1-2 tablets (5-10 mg) by mouth every 4 hours as needed  prochlorperazine (COMPAZINE) 5 MG tablet, Take 1 tablet (5 mg) by mouth every 6 hours as needed for nausea or vomiting  senna-docusate (SENOKOT-S/PERICOLACE) 8.6-50 MG tablet, Take 1-2 tablets by mouth 2 times daily Take while on oral narcotics to prevent or treat constipation.  triamcinolone (KENALOG) 0.1 % external cream, apply topically twice a day as needed for a maximum of 14 days    No current facility-administered medications on file prior to visit.       Social History     Tobacco Use     Smoking status: Former  Smoker     Packs/day: 1.00     Years: 25.00     Pack years: 25.00     Smokeless tobacco: Never Used     Tobacco comment: Quit 15 years ago.   Substance Use Topics     Alcohol use: No       ROS:  CONSTITUTIONAL: Negative for fatigue or fever.  EYES: Negative for eye problems.  ENT: As above.  RESP: As above.  CV: Negative for chest pains.  GI: Negative for vomiting.  MUSCULOSKELETAL:  Negative for significant muscle or joint pains.  NEUROLOGIC: Negative for headaches.  SKIN: Negative for rash.  PSYCH: Normal mentation for age.    OBJECTIVE:  BP (!) 165/63 (BP Location: Left arm, Patient Position: Sitting, Cuff Size: Adult Large)   Pulse 79   Temp 98.6  F (37  C) (Oral)   Resp 14   SpO2 97%   GENERAL APPEARANCE: Has a dry coughing spell in the room where she almost vomits.    EYES:Conjunctiva/sclera clear.  EARS: No cerumen.   Ear canals w/o erythema.  TM's intact w/o erythema.    NOSE/MOUTH: Nose without ulcers, erythema or lesions.  SINUSES: No maxillary sinus tenderness.  THROAT: No erythema w/o tonsillar enlargement . No exudates.  NECK: Supple, nontender, no lymphadenopathy.  RESP: Lungs clear to auscultation - no rales, rhonchi or wheezes  CV: Regular rate and rhythm, normal S1 S2, no murmur noted.  NEURO: Awake, alert    SKIN: No rashes        Patricia Ross PA-C

## 2021-06-18 NOTE — TELEPHONE ENCOUNTER
Patient's , Dereje called clinic.  He states the patient has been dealing with a dry cough for about 1 week with no other symptoms.  Dereje wants patient to be seen but not wanting to go to .  Discussed home remedies and he verbalized understanding.  Offered to schedule an appt on Monday, he declined.  He will call back for worsening concerns.  She had her J&J vaccine 2 months ago.    Reason for Disposition    Cough    Additional Information    Negative: Severe difficulty breathing (e.g., struggling for each breath, speaks in single words)    Negative: Bluish (or gray) lips or face now    Negative: [1] Rapid onset of cough AND [2] has hives    Negative: Coughing started suddenly after medicine, an allergic food or bee sting    Negative: [1] Difficulty breathing AND [2] exposure to flames, smoke, or fumes    Negative: [1] Stridor AND [2] difficulty breathing    Negative: Sounds like a life-threatening emergency to the triager    Negative: Choked on object of food that could be caught in the throat    Negative: Chest pain is main symptom    Negative: [1] Previous asthma attacks AND [2] this feels like asthma attack    Negative: Cough lasts > 3 weeks    Negative: Wet (productive) cough (i.e., white-yellow, yellow, green, or ilsa colored sputum)    Negative: [1] Dry (non-productive) cough AND [2] within 14 days of COVID-19 Exposure    Negative: Chest pain  (Exception: MILD central chest pain, present only when coughing)    Negative: Difficulty breathing    Negative: [1] Coughed up blood AND [2] > 1 tablespoon (15 ml) (Exception: blood-tinged sputum)    Negative: Fever > 103 F (39.4 C)    Negative: [1] Fever > 101 F (38.3 C) AND [2] age > 60    Negative: [1] Fever > 100.0 F (37.8 C) AND [2] bedridden (e.g., nursing home patient, CVA, chronic illness, recovering from surgery)    Negative: [1] Fever > 100.0 F (37.8 C) AND [2] diabetes mellitus or weak immune system (e.g., HIV positive, cancer chemo, splenectomy,  "organ transplant, chronic steroids)    Negative: Wheezing is present    Negative: [1] Ankle swelling AND [2] swelling is increasing    Negative: SEVERE coughing spells (e.g., whooping sound after coughing, vomiting after coughing)    Negative: [1] Continuous (nonstop) coughing interferes with work or school AND [2] no improvement using cough treatment per protocol    Negative: Fever present > 3 days (72 hours)    Negative: [1] Fever returns after gone for over 24 hours AND [2] symptoms worse or not improved    Negative: [1] Using nasal washes and pain medicine > 24 hours AND [2] sinus pain (around cheekbone or eye) persists    Negative: Earache is present    Negative: Cough has been present for > 3 weeks    Negative: [1] Nasal discharge AND [2] present > 10 days    Negative: [1] Coughed up blood-tinged sputum AND [2] more than once    Negative: [1] Patient also has allergy symptoms (e.g., itchy eyes, clear nasal discharge, postnasal drip) AND [2] they are acting up    Negative: Taking an ACE Inhibitor medication  (e.g., benazepril/LOTENSIN, captopril/CAPOTEN, enalapril/VASOTEC, lisinopril/ZESTRIL)    Negative: Exposure to TB (Tuberculosis)    Answer Assessment - Initial Assessment Questions  1. ONSET: \"When did the cough begin?\"       1 week ago  2. SEVERITY: \"How bad is the cough today?\"       Can go 1 hour without coughing, then will have a \"coughing spell\"  3. RESPIRATORY DISTRESS: \"Describe your breathing.\"       fine  4. FEVER: \"Do you have a fever?\" If so, ask: \"What is your temperature, how was it measured, and when did it start?\"      no  5. HEMOPTYSIS: \"Are you coughing up any blood?\" If so ask: \"How much?\" (flecks, streaks, tablespoons, etc.)      No  6. TREATMENT: \"What have you done so far to treat the cough?\" (e.g., meds, fluids, humidifier)      Just cough medicine, she takes it at night.  7. CARDIAC HISTORY: \"Do you have any history of heart disease?\" (e.g., heart attack, congestive heart failure)     " "  n/a  8. LUNG HISTORY: \"Do you have any history of lung disease?\"  (e.g., pulmonary embolus, asthma, emphysema)      n/a  9. PE RISK FACTORS: \"Do you have a history of blood clots?\" (or: recent major surgery, recent prolonged travel, bedridden)      n/a  10. OTHER SYMPTOMS: \"Do you have any other symptoms? (e.g., runny nose, wheezing, chest pain)         No other symptoms.  11. PREGNANCY: \"Is there any chance you are pregnant?\" \"When was your last menstrual period?\"        N/A  12. TRAVEL: \"Have you traveled out of the country in the last month?\" (e.g., travel history, exposures)        N/A    Protocols used: COUGH - ACUTE NON-PRODUCTIVE-A-    Brandi Asencio RN, [facility]    "

## 2021-06-19 LAB
B PARAPERT DNA SPEC QL NAA+PROBE: NOT DETECTED
B PERT DNA SPEC QL NAA+PROBE: NOT DETECTED
BORDETELLA COMMENT: NORMAL
LABORATORY COMMENT REPORT: NORMAL
SARS-COV-2 RNA RESP QL NAA+PROBE: NEGATIVE
SPECIMEN SOURCE: NORMAL

## 2021-07-05 ENCOUNTER — TELEPHONE (OUTPATIENT)
Dept: FAMILY MEDICINE | Facility: CLINIC | Age: 76
End: 2021-07-05

## 2021-07-05 NOTE — TELEPHONE ENCOUNTER
Patient's  walked into the [facility] to schedule an appointment with a [facility] Provider for the patient regarding ongoing cough  The patient was not with [facility], she was at home.       staff let Dereje know, there were no openings today at any of the 3 clinics, but there is .  He stated that he refused to go to the  at [facility] again.      Staff let him know, RN will call the patient for triage, Dereje stated that his wife would not answer the phone.     Dereje left before RN could speak to him.      Called the patient and Dereje, left messages with [facility] number to call back.    Need to triage cough.         Per  visit 6/18/21:  PLAN: Vital signs are stable.  Spasmatic cough.  No concerning signs or symptoms at this time for pulmonary embolism or DVT.  Suspect pertussis.  Will treat with azithromycin.  Also treat with Tessalon Perles.  Covid and pertussis test pending.  Follow-up with primary next week.  I have discussed clinical findings with patient.  Side effects of medications discussed.  Symptomatic care is discussed.  I have discussed the possibility of  worsening symptoms and indication to RTC or go to the ER if they occur.  All questions are answered, patient indicates understanding of these issues and is in agreement with plan.   Patient care instructions are discussed/given at the end of visit.   Lots of rest and fluids.        ERIKA Andino RN, [facility]

## 2021-07-05 NOTE — TELEPHONE ENCOUNTER
Patient's  calling.   He reports patients cough has improved slightly. Sometimes she still has a coughing fit where she almost vomits otherwise it's a nagging cough.   Denies fever, wheezing, shortness of breath.  Patient is feeling well.  Patient is out of Tessalon and wondering what they should do about the cough.     Kathya Coughlin BSN, RN

## 2021-07-05 NOTE — TELEPHONE ENCOUNTER
With on-going cough recommend visit with a provider, virtual okay to determine if more tessalon pearls is needed. Please help them set up an appointment virtual today or tomorrow.  Thank you,  RANDALL Shaffer, NP-C  [facility]

## 2021-07-06 NOTE — TELEPHONE ENCOUNTER
Spoke to patient, she handed the phone to her , Dereje.     He states the patient has ongoing cough, would like to be seen by Dr. Ramirez specifically.  Offered to schedule an appointment with another provider today as Paulina Ge NP advised.  He declined.  He states if the cough gets worse or develops new symptoms, they will go back to UC or ED.        Brandi Asencio RN, [facility]

## 2021-07-08 NOTE — PROGRESS NOTES
"    Assessment & Plan     Cough  Seen with her  today for cough that has been present for 4 to 6 weeks.  Started out of the blue without any exacerbating factor including no illness.  Still has no illness symptoms but has paroxysms of cough that are difficult to stop and can even lead to some vomiting.  Seems to center in her lower throat.  Sometimes food irritates that area but does not necessarily stick.  Years ago she had a tracheostomy in that location but supposedly things had healed up.  Not sure when they last saw ENT.  No wheezing.    My exam was unremarkable.  Vital signs are normal.  Chest x-ray was normal.  So this does not really seem to be a primary pulmonary issue and perhaps centers around the throat.  So like to try a course of prednisone and cough suppression with hydrocodone.  Hoping to see results in the next few days.  The next step if this is not improving would be ENT to take a look.  - XR Chest 2 Views  - predniSONE (DELTASONE) 20 MG tablet; Take 2 tablets (40 mg) by mouth daily for 7 days  - HYDROcodone-acetaminophen (NORCO) 5-325 MG tablet; 1/2 to 1 pill 3 times daily as needed for cough    Generalized anxiety disorder  Stable on current regimen.  Continue same plan and routine follow-up.   - citalopram (CELEXA) 20 MG tablet; Take 1 tablet (20 mg) by mouth daily    Recurrent major depressive disorder, in partial remission (H)  Stable on current regimen.  Continue same plan and routine follow-up.   - citalopram (CELEXA) 20 MG tablet; Take 1 tablet (20 mg) by mouth daily    Seizure disorder (H)  Stable on current regimen.  Continue same plan and routine follow-up.   - lamoTRIgine (LAMICTAL) 200 MG tablet; Take 1 tablet (200 mg) by mouth daily Reported on 2/15/2017       BMI:   Estimated body mass index is 33.78 kg/m  as calculated from the following:    Height as of 4/19/21: 1.651 m (5' 5\").    Weight as of this encounter: 92.1 kg (203 lb).   Weight management plan: Discussed healthy " diet and exercise guidelines    See Patient Instructions    Return in about 1 week (around 7/16/2021).    Shanel Ramirez MD  [facility] KRISTINE Kam is a 76 year old who presents for the following health issues  accompanied by her spouse:    HPI     Concern - Cough    Onset: for about a months  Description: On and off coughing attacks  Intensity: moderate, severe  Progression of Symptoms:  same  Accompanying Signs & Symptoms: Rib pain  Previous history of similar problem: None   Precipitating factors:        Worsened by: Unknown  Alleviating factors:        Improved by: Nothing  Therapies tried and outcome:  Was seen in UC, OTC    Here today to talk about cough as above.  Started about a month ago without any specific exacerbating factor.  Comes in paroxysms but is severe.    Review of Systems   Constitutional, HEENT, cardiovascular, pulmonary, gi and gu systems are negative, except as otherwise noted.      Objective    BP (!) 167/75 (BP Location: Left arm, Patient Position: Sitting, Cuff Size: Adult Regular)   Pulse 109   Temp 98.8  F (37.1  C)   Resp 24   Wt 92.1 kg (203 lb)   SpO2 97%   Breastfeeding No   BMI 33.78 kg/m    Body mass index is 33.78 kg/m .  Physical Exam   Alert and pleasant the patient with a significant cough fit.  Not dyspneic however.  Ears normal. Throat and pharynx normal. Neck supple. No adenopathy or masses in the neck or supraclavicular regions. Sinuses non tender.  S1 and S2 normal, no murmurs, clicks, gallops or rubs. Regular rate and rhythm. Chest is clear; no wheezes or rales. No edema or JVD.  Past labs reviewed with the patient.     Xray - Reviewed and interpreted by me.  Negative chest x-ray

## 2021-07-09 ENCOUNTER — ANCILLARY PROCEDURE (OUTPATIENT)
Dept: GENERAL RADIOLOGY | Facility: CLINIC | Age: 76
End: 2021-07-09
Attending: FAMILY MEDICINE
Payer: COMMERCIAL

## 2021-07-09 ENCOUNTER — OFFICE VISIT (OUTPATIENT)
Dept: FAMILY MEDICINE | Facility: CLINIC | Age: 76
End: 2021-07-09
Payer: COMMERCIAL

## 2021-07-09 VITALS
OXYGEN SATURATION: 97 % | SYSTOLIC BLOOD PRESSURE: 167 MMHG | HEART RATE: 109 BPM | DIASTOLIC BLOOD PRESSURE: 75 MMHG | TEMPERATURE: 98.8 F | WEIGHT: 203 LBS | RESPIRATION RATE: 24 BRPM | BODY MASS INDEX: 33.78 KG/M2

## 2021-07-09 DIAGNOSIS — R05.9 COUGH: Primary | ICD-10-CM

## 2021-07-09 DIAGNOSIS — G40.909 SEIZURE DISORDER (H): ICD-10-CM

## 2021-07-09 DIAGNOSIS — F33.41 RECURRENT MAJOR DEPRESSIVE DISORDER, IN PARTIAL REMISSION (H): ICD-10-CM

## 2021-07-09 DIAGNOSIS — F41.1 GENERALIZED ANXIETY DISORDER: ICD-10-CM

## 2021-07-09 PROCEDURE — 99214 OFFICE O/P EST MOD 30 MIN: CPT | Performed by: FAMILY MEDICINE

## 2021-07-09 PROCEDURE — 71046 X-RAY EXAM CHEST 2 VIEWS: CPT | Mod: FY | Performed by: RADIOLOGY

## 2021-07-09 RX ORDER — PREDNISONE 20 MG/1
40 TABLET ORAL DAILY
Qty: 14 TABLET | Refills: 0 | Status: SHIPPED | OUTPATIENT
Start: 2021-07-09 | End: 2021-07-16

## 2021-07-09 RX ORDER — BENZONATATE 100 MG/1
100 CAPSULE ORAL 3 TIMES DAILY PRN
Qty: 30 CAPSULE | Refills: 1 | Status: CANCELLED | OUTPATIENT
Start: 2021-07-09

## 2021-07-09 RX ORDER — HYDROCODONE BITARTRATE AND ACETAMINOPHEN 5; 325 MG/1; MG/1
TABLET ORAL
Qty: 18 TABLET | Refills: 0 | Status: SHIPPED | OUTPATIENT
Start: 2021-07-09 | End: 2021-10-04

## 2021-07-09 RX ORDER — LAMOTRIGINE 200 MG/1
200 TABLET ORAL DAILY
Qty: 90 TABLET | Refills: 3 | Status: SHIPPED | OUTPATIENT
Start: 2021-07-09

## 2021-07-09 RX ORDER — CITALOPRAM HYDROBROMIDE 20 MG/1
20 TABLET ORAL DAILY
Qty: 90 TABLET | Refills: 0 | Status: SHIPPED | OUTPATIENT
Start: 2021-07-09 | End: 2021-10-04

## 2021-07-09 NOTE — PROGRESS NOTES
Answers for HPI/ROS submitted by the patient on 7/9/2021   Chronic problems general questions HPI Form  How many servings of fruits and vegetables do you eat daily?: 0-1  On average, how many sweetened beverages do you drink each day (Examples: soda, juice, sweet tea, etc.  Do NOT count diet or artificially sweetened beverages)?: 0  How many minutes a day do you exercise enough to make your heart beat faster?: 9 or less  How many days a week do you exercise enough to make your heart beat faster?: 3 or less  How many days per week do you miss taking your medication?: 0

## 2021-07-09 NOTE — PATIENT INSTRUCTIONS
We are going to try to settle this down literally in the next few days:    1) prednisone -oral steroid to decrease any inflammation that may be making this worse.    2) hydrocodone tablets 1/2 to 1 tablet 3 times daily as needed for cough.  Can make you a bit drowsy, so be cautious.  But I think it is very important to suppress the cough itself and give this a chance to heal up.    We should see improvement literally over the next few days so let me know next week if we are moving in the right direction.  If not we may want one of the specialist to take a look down into the throat near where the tracheostomy was.

## 2021-07-13 ENCOUNTER — TELEPHONE (OUTPATIENT)
Dept: FAMILY MEDICINE | Facility: CLINIC | Age: 76
End: 2021-07-13

## 2021-07-13 DIAGNOSIS — R05.9 COUGH: Primary | ICD-10-CM

## 2021-07-13 DIAGNOSIS — Z98.890 HISTORY OF TRACHEOSTOMY: ICD-10-CM

## 2021-07-13 NOTE — TELEPHONE ENCOUNTER
"  Patient's  came in to give an update on the patient. He states he was to give an update early this week.  She has prednisone that she is still taking.      Patient's  cough is a little better but she still coughs \"a lot\".  She is not worse.  He would like to proceed with the next step.    Routing to Dr. Ramirez to please review.       Call patient's  at [phone] with advisement.    Brandi Asencio RN, [facility]        "

## 2021-07-13 NOTE — TELEPHONE ENCOUNTER
Okay, the next step is to get her in with ENT to take a look down into the throat.  So I will order up the consultation and have the schedulers call them.

## 2021-07-15 ENCOUNTER — TELEPHONE (OUTPATIENT)
Dept: FAMILY MEDICINE | Facility: CLINIC | Age: 76
End: 2021-07-15

## 2021-07-15 NOTE — TELEPHONE ENCOUNTER
Patient's  calling to schedule ENT appointment that Dr Ramirez gave her a referral for.  This RN looked at the ENT referral and gave him the [facility] phone number to call and schedule appointment.     Kathya Coughlin BSN, RN

## 2021-07-22 ENCOUNTER — OFFICE VISIT (OUTPATIENT)
Dept: OTOLARYNGOLOGY | Facility: CLINIC | Age: 76
End: 2021-07-22
Payer: COMMERCIAL

## 2021-07-22 VITALS
DIASTOLIC BLOOD PRESSURE: 69 MMHG | SYSTOLIC BLOOD PRESSURE: 164 MMHG | HEART RATE: 70 BPM | RESPIRATION RATE: 18 BRPM | OXYGEN SATURATION: 98 %

## 2021-07-22 DIAGNOSIS — Z98.890 HISTORY OF TRACHEOSTOMY: ICD-10-CM

## 2021-07-22 DIAGNOSIS — R05.9 COUGH: Primary | ICD-10-CM

## 2021-07-22 PROCEDURE — 99203 OFFICE O/P NEW LOW 30 MIN: CPT | Performed by: OTOLARYNGOLOGY

## 2021-07-22 NOTE — LETTER
7/22/2021         RE: Negin Jonas  [address]  [place] [address]        Dear Colleague,    Thank you for referring your patient, Negin Jonas, to the [facility]. Please see a copy of my visit note below.    Chief Complaint - chronic cough    History of Present Illness - Negin Jonas is a 76 year old female who presents with a cough for approximately 6-8 weeks. She has a h/o trach in 2004. They describe the cough as nonproductive that comes and goes, like a tickle. No upper respiratory infection or allergies. Voicing has seemed normal. No pain. They note a history of relux, but takes medication for this and denies symptoms. No hemoptysis, dysphagia, or odynaphagia. The patient has tried prednisone and hydrocodone. No help. However, she has gotten better. They don't take lisinopril or other ACEI. CXR from 7/9/21 was personally reviewed showing old fractures of the humerus and ribs, but no cause for cough. They deny breathing problems. never smoker. I personally reviewed the relevant clinical notes in Epic including the primary care providers note. Overall the cough has gotten much better.     Past Medical History -   Patient Active Problem List   Diagnosis     CARDIOVASCULAR SCREENING; LDL GOAL LESS THAN 160     Chronic rhinitis     Psoriasis     Short-term memory loss     Generalized anxiety disorder     Panic attacks     Advanced directives, counseling/discussion     Health Care Home     Bursitis of right knee     Traumatic brain injury (H)     Vertigo     Seizure disorder (H)     Recurrent major depressive disorder, in partial remission (H)     Adhesive capsulitis of both shoulders     Primary osteoarthritis of both knees     History of total left knee replacement     Primary osteoarthritis of right knee     S/P total knee arthroplasty     History of total right knee replacement       Current Medications -   Current Outpatient Medications:       acetaminophen (TYLENOL) 325 MG tablet, Take 2 tablets (650 mg) by mouth every 4 hours as needed for other (mild pain), Disp: 100 tablet, Rfl: 0     citalopram (CELEXA) 20 MG tablet, Take 1 tablet (20 mg) by mouth daily, Disp: 90 tablet, Rfl: 0     fluticasone (FLONASE) 50 MCG/ACT nasal spray, use 1 - 2 sprays into both nostrils daily For nasal allergies, Disp: 48 g, Rfl: 1     HYDROcodone-acetaminophen (NORCO) 5-325 MG tablet, 1/2 to 1 pill 3 times daily as needed for cough, Disp: 18 tablet, Rfl: 0     hydrOXYzine (ATARAX) 10 MG tablet, Take 1 tablet (10 mg) by mouth every 6 hours as needed for itching or anxiety (with pain, moderate pain), Disp: 30 tablet, Rfl: 0     lamoTRIgine (LAMICTAL) 200 MG tablet, Take 1 tablet (200 mg) by mouth daily Reported on 2/15/2017, Disp: 90 tablet, Rfl: 3     omeprazole (PRILOSEC) 20 MG DR capsule, Take 2 capsules (40 mg) by mouth daily, Disp: 180 capsule, Rfl: 0     prochlorperazine (COMPAZINE) 5 MG tablet, Take 1 tablet (5 mg) by mouth every 6 hours as needed for nausea or vomiting, Disp: 20 tablet, Rfl: 1     senna-docusate (SENOKOT-S/PERICOLACE) 8.6-50 MG tablet, Take 1-2 tablets by mouth 2 times daily Take while on oral narcotics to prevent or treat constipation., Disp: 30 tablet, Rfl: 0     triamcinolone (KENALOG) 0.1 % external cream, apply topically twice a day as needed for a maximum of 14 days, Disp: 80 g, Rfl: 1    Allergies -   Allergies   Allergen Reactions     Codeine Sulfate        Social History -   Social History     Socioeconomic History     Marital status:      Spouse name: Not on file     Number of children: Not on file     Years of education: Not on file     Highest education level: Not on file   Occupational History     Not on file   Tobacco Use     Smoking status: Former Smoker     Packs/day: 1.00     Years: 25.00     Pack years: 25.00     Smokeless tobacco: Never Used     Tobacco comment: Quit 15 years ago.   Substance and Sexual Activity     Alcohol  use: No     Drug use: No     Sexual activity: Never   Other Topics Concern     Parent/sibling w/ CABG, MI or angioplasty before 65F 55M? Not Asked   Social History Narrative     Not on file     Social Determinants of Health     Financial Resource Strain:      Difficulty of Paying Living Expenses:    Food Insecurity:      Worried About Running Out of Food in the Last Year:      Ran Out of Food in the Last Year:    Transportation Needs:      Lack of Transportation (Medical):      Lack of Transportation (Non-Medical):    Physical Activity:      Days of Exercise per Week:      Minutes of Exercise per Session:    Stress:      Feeling of Stress :    Social Connections:      Frequency of Communication with Friends and Family:      Frequency of Social Gatherings with Friends and Family:      Attends Christianity Services:      Active Member of Clubs or Organizations:      Attends Club or Organization Meetings:      Marital Status:    Intimate Partner Violence:      Fear of Current or Ex-Partner:      Emotionally Abused:      Physically Abused:      Sexually Abused:        Family History -   Family History   Problem Relation Age of Onset     C.A.D. Father 70        CABG x 3      Respiratory Father         emphysema     Psychotic Disorder Father         depression     Diabetes Mother      Psychotic Disorder Mother         depression     Diabetes Maternal Grandfather      Hypertension Sister      Anxiety Disorder Sister      Respiratory Sister         emphysema     Review of Systems - As per HPI and PMHx, otherwise 7 system review of the head and neck is negative.    Physical Exam  General - The patient is in no distress. Alert and oriented to person and place, answers questions and cooperates with examination appropriately.   Voice and Breathing - The patient was breathing comfortably without the use of accessory muscles. There was no wheezing, stridor, or stertor.  The patients voice was clear and strong, and had appropriate  pitch and quality. No coughing.  Neuro - CN2-12 intact. HB 1 out of 6.   Eyes - Extraocular movements intact.  Sclera were not icteric or injected, conjunctiva were pink and moist.  Mouth - Examination of the oral cavity showed pink, healthy oral mucosa. No lesions or ulcerations noted.  The tongue was mobile and midline.  Throat - The walls of the oropharynx were smooth, symmetric, and had no lesions or ulcerations.  The uvula was midline on elevation. Tonsils were 1-2+ and quiet. Palpation of tonsils did not elicit cough.  Nose - External contour is symmetric, no gross deflection or scars.  Nasal mucosa is pink and moist with no abnormal mucus.  The septum was midline and non-obstructive, turbinates of normal size and position.  No polyps, masses, or purulence noted on examination.  Neck -  Soft, nontender. Palpation of the occipital, submental, submandibular, internal jugular chain, and supraclavicular nodes did not demonstrate any abnormal lymph nodes or masses. Parotid glands had no masses. Palpation of the thyroid was soft and smooth, with no nodules or goiter appreciated.  The trachea was mobile and midline.    A/P - Negin Jonas is a 76 year old female with cough but no evidence of infection, inflammation, or neoplasm on exam to explain the symptoms. The differential includes: postnasal drainage, laryngopharyngeal reflux, allergies, or a pulmonary source.  Fortunately she is doing much better.  I offered laryngoscopy but she deferred.  If she develops any new symptoms such as troubles breathing or swallowing, productive cough, hemoptysis, pain, or worsening cough she should return for laryngoscopy.  Currently she has no warning symptoms and her cough is improving so we elected to give it more time.    Dr. Afshin Edwards  Otolaryngology  [facility]        Again, thank you for allowing me to participate in the care of your patient.        Sincerely,        Afshin Edwards MD

## 2021-07-22 NOTE — PROGRESS NOTES
Chief Complaint - chronic cough    History of Present Illness - Negin Jonas is a 76 year old female who presents with a cough for approximately 6-8 weeks. She has a h/o trach in 2004. They describe the cough as nonproductive that comes and goes, like a tickle. No upper respiratory infection or allergies. Voicing has seemed normal. No pain. They note a history of relux, but takes medication for this and denies symptoms. No hemoptysis, dysphagia, or odynaphagia. The patient has tried prednisone and hydrocodone. No help. However, she has gotten better. They don't take lisinopril or other ACEI. CXR from 7/9/21 was personally reviewed showing old fractures of the humerus and ribs, but no cause for cough. They deny breathing problems. never smoker. I personally reviewed the relevant clinical notes in Epic including the primary care providers note. Overall the cough has gotten much better.     Past Medical History -   Patient Active Problem List   Diagnosis     CARDIOVASCULAR SCREENING; LDL GOAL LESS THAN 160     Chronic rhinitis     Psoriasis     Short-term memory loss     Generalized anxiety disorder     Panic attacks     Advanced directives, counseling/discussion     Health Care Home     Bursitis of right knee     Traumatic brain injury (H)     Vertigo     Seizure disorder (H)     Recurrent major depressive disorder, in partial remission (H)     Adhesive capsulitis of both shoulders     Primary osteoarthritis of both knees     History of total left knee replacement     Primary osteoarthritis of right knee     S/P total knee arthroplasty     History of total right knee replacement       Current Medications -   Current Outpatient Medications:      acetaminophen (TYLENOL) 325 MG tablet, Take 2 tablets (650 mg) by mouth every 4 hours as needed for other (mild pain), Disp: 100 tablet, Rfl: 0     citalopram (CELEXA) 20 MG tablet, Take 1 tablet (20 mg) by mouth daily, Disp: 90 tablet, Rfl: 0     fluticasone (FLONASE)  50 MCG/ACT nasal spray, use 1 - 2 sprays into both nostrils daily For nasal allergies, Disp: 48 g, Rfl: 1     HYDROcodone-acetaminophen (NORCO) 5-325 MG tablet, 1/2 to 1 pill 3 times daily as needed for cough, Disp: 18 tablet, Rfl: 0     hydrOXYzine (ATARAX) 10 MG tablet, Take 1 tablet (10 mg) by mouth every 6 hours as needed for itching or anxiety (with pain, moderate pain), Disp: 30 tablet, Rfl: 0     lamoTRIgine (LAMICTAL) 200 MG tablet, Take 1 tablet (200 mg) by mouth daily Reported on 2/15/2017, Disp: 90 tablet, Rfl: 3     omeprazole (PRILOSEC) 20 MG DR capsule, Take 2 capsules (40 mg) by mouth daily, Disp: 180 capsule, Rfl: 0     prochlorperazine (COMPAZINE) 5 MG tablet, Take 1 tablet (5 mg) by mouth every 6 hours as needed for nausea or vomiting, Disp: 20 tablet, Rfl: 1     senna-docusate (SENOKOT-S/PERICOLACE) 8.6-50 MG tablet, Take 1-2 tablets by mouth 2 times daily Take while on oral narcotics to prevent or treat constipation., Disp: 30 tablet, Rfl: 0     triamcinolone (KENALOG) 0.1 % external cream, apply topically twice a day as needed for a maximum of 14 days, Disp: 80 g, Rfl: 1    Allergies -   Allergies   Allergen Reactions     Codeine Sulfate        Social History -   Social History     Socioeconomic History     Marital status:      Spouse name: Not on file     Number of children: Not on file     Years of education: Not on file     Highest education level: Not on file   Occupational History     Not on file   Tobacco Use     Smoking status: Former Smoker     Packs/day: 1.00     Years: 25.00     Pack years: 25.00     Smokeless tobacco: Never Used     Tobacco comment: Quit 15 years ago.   Substance and Sexual Activity     Alcohol use: No     Drug use: No     Sexual activity: Never   Other Topics Concern     Parent/sibling w/ CABG, MI or angioplasty before 65F 55M? Not Asked   Social History Narrative     Not on file     Social Determinants of Health     Financial Resource Strain:      Difficulty  of Paying Living Expenses:    Food Insecurity:      Worried About Running Out of Food in the Last Year:      Ran Out of Food in the Last Year:    Transportation Needs:      Lack of Transportation (Medical):      Lack of Transportation (Non-Medical):    Physical Activity:      Days of Exercise per Week:      Minutes of Exercise per Session:    Stress:      Feeling of Stress :    Social Connections:      Frequency of Communication with Friends and Family:      Frequency of Social Gatherings with Friends and Family:      Attends Yarsani Services:      Active Member of Clubs or Organizations:      Attends Club or Organization Meetings:      Marital Status:    Intimate Partner Violence:      Fear of Current or Ex-Partner:      Emotionally Abused:      Physically Abused:      Sexually Abused:        Family History -   Family History   Problem Relation Age of Onset     C.A.D. Father 70        CABG x 3      Respiratory Father         emphysema     Psychotic Disorder Father         depression     Diabetes Mother      Psychotic Disorder Mother         depression     Diabetes Maternal Grandfather      Hypertension Sister      Anxiety Disorder Sister      Respiratory Sister         emphysema     Review of Systems - As per HPI and PMHx, otherwise 7 system review of the head and neck is negative.    Physical Exam  General - The patient is in no distress. Alert and oriented to person and place, answers questions and cooperates with examination appropriately.   Voice and Breathing - The patient was breathing comfortably without the use of accessory muscles. There was no wheezing, stridor, or stertor.  The patients voice was clear and strong, and had appropriate pitch and quality. No coughing.  Neuro - CN2-12 intact. HB 1 out of 6.   Eyes - Extraocular movements intact.  Sclera were not icteric or injected, conjunctiva were pink and moist.  Mouth - Examination of the oral cavity showed pink, healthy oral mucosa. No lesions or  ulcerations noted.  The tongue was mobile and midline.  Throat - The walls of the oropharynx were smooth, symmetric, and had no lesions or ulcerations.  The uvula was midline on elevation. Tonsils were 1-2+ and quiet. Palpation of tonsils did not elicit cough.  Nose - External contour is symmetric, no gross deflection or scars.  Nasal mucosa is pink and moist with no abnormal mucus.  The septum was midline and non-obstructive, turbinates of normal size and position.  No polyps, masses, or purulence noted on examination.  Neck -  Soft, nontender. Palpation of the occipital, submental, submandibular, internal jugular chain, and supraclavicular nodes did not demonstrate any abnormal lymph nodes or masses. Parotid glands had no masses. Palpation of the thyroid was soft and smooth, with no nodules or goiter appreciated.  The trachea was mobile and midline.    A/P - Negin Jonas is a 76 year old female with cough but no evidence of infection, inflammation, or neoplasm on exam to explain the symptoms. The differential includes: postnasal drainage, laryngopharyngeal reflux, allergies, or a pulmonary source.  Fortunately she is doing much better.  I offered laryngoscopy but she deferred.  If she develops any new symptoms such as troubles breathing or swallowing, productive cough, hemoptysis, pain, or worsening cough she should return for laryngoscopy.  Currently she has no warning symptoms and her cough is improving so we elected to give it more time.    Dr. Afshin Edwards  Otolaryngology  [facility]

## 2021-10-02 NOTE — PROGRESS NOTES
ASSESSMENT / PLAN:  (R11.2) Nausea and vomiting, intractability of vomiting not specified, unspecified vomiting type  (primary encounter diagnosis)  Comment: Was having intermittent spontaneous nausea with a little bit of vomiting for a few weeks.  No other symptoms associated with this.  Does have a history of reflux but is on omeprazole.  Started taking probiotics about a week ago and since then things have returned to normal.  Patient not having any more symptoms.  Okay to continue and we will follow along.  Plan:     (F33.41) Recurrent major depressive disorder, in partial remission (H)  Comment: Stable on current regimen.  Continue same plan and routine follow-up.   Plan: citalopram (CELEXA) 20 MG tablet            (F41.1) Generalized anxiety disorder  Comment: Stable on current regimen.  Continue same plan and routine follow-up.   Plan: citalopram (CELEXA) 20 MG tablet            (K21.9) Gastroesophageal reflux disease without esophagitis  Comment: Continue therapy with this  Plan: omeprazole (PRILOSEC) 20 MG DR capsule            (Z23) Need for prophylactic vaccination and inoculation against influenza  Comment:   Plan:            Edmar Kam is a 76 year old who presents for the following health issues  accompanied by her self:    HPI     Abdominal/Flank Pain  Onset/Duration: 30 days   Description:   Character: Burning  Location: epigastric region  Radiation: None  Intensity: moderate  Progression of Symptoms:  improving  Accompanying Signs & Symptoms:  Fever/Chills: no  Gas/Bloating: no  Nausea: YES  Vomitting: YES  Diarrhea: no  Constipation: no  Dysuria or Hematuria: no  History:   Trauma: no  Previous similar pain: no  Previous tests done: none  Precipitating factors:   Does the pain change with:     Food: no    Bowel Movement: no    Urination: no   Other factors:  no  Therapies tried and outcome: patient advised she started taking a probiotic  No LMP recorded. Patient has had a  "hysterectomy.        Previous history reviewed with patient and in her chart.        Objective    BP (!) 140/75   Pulse 78   Temp 98.3  F (36.8  C) (Oral)   Resp 18   Ht 1.651 m (5' 5\")   Wt 93.8 kg (206 lb 12.8 oz)   SpO2 96%   BMI 34.41 kg/m    Body mass index is 34.41 kg/m .  Physical Exam   Alert, pleasant, upbeat, and in no apparent discomfort.  S1 and S2 normal, no murmurs, clicks, gallops or rubs. Regular rate and rhythm. Chest is clear; no wheezes or rales. No edema or JVD.  The abdomen is soft without tenderness, guarding, mass, rebound or organomegaly. Bowel sounds are normal. No CVA tenderness or inguinal adenopathy noted.  Past labs reviewed with the patient.                 "

## 2021-10-04 ENCOUNTER — OFFICE VISIT (OUTPATIENT)
Dept: FAMILY MEDICINE | Facility: CLINIC | Age: 76
End: 2021-10-04
Payer: COMMERCIAL

## 2021-10-04 VITALS
OXYGEN SATURATION: 96 % | DIASTOLIC BLOOD PRESSURE: 75 MMHG | RESPIRATION RATE: 18 BRPM | WEIGHT: 206.8 LBS | HEART RATE: 78 BPM | HEIGHT: 65 IN | BODY MASS INDEX: 34.45 KG/M2 | TEMPERATURE: 98.3 F | SYSTOLIC BLOOD PRESSURE: 140 MMHG

## 2021-10-04 DIAGNOSIS — Z23 NEED FOR PROPHYLACTIC VACCINATION AND INOCULATION AGAINST INFLUENZA: ICD-10-CM

## 2021-10-04 DIAGNOSIS — F41.1 GENERALIZED ANXIETY DISORDER: ICD-10-CM

## 2021-10-04 DIAGNOSIS — R11.2 NAUSEA AND VOMITING, INTRACTABILITY OF VOMITING NOT SPECIFIED, UNSPECIFIED VOMITING TYPE: Primary | ICD-10-CM

## 2021-10-04 DIAGNOSIS — K21.9 GASTROESOPHAGEAL REFLUX DISEASE WITHOUT ESOPHAGITIS: ICD-10-CM

## 2021-10-04 DIAGNOSIS — F33.41 RECURRENT MAJOR DEPRESSIVE DISORDER, IN PARTIAL REMISSION (H): ICD-10-CM

## 2021-10-04 PROCEDURE — G0008 ADMIN INFLUENZA VIRUS VAC: HCPCS | Performed by: FAMILY MEDICINE

## 2021-10-04 PROCEDURE — 90662 IIV NO PRSV INCREASED AG IM: CPT | Performed by: FAMILY MEDICINE

## 2021-10-04 PROCEDURE — 99214 OFFICE O/P EST MOD 30 MIN: CPT | Mod: 25 | Performed by: FAMILY MEDICINE

## 2021-10-04 RX ORDER — CITALOPRAM HYDROBROMIDE 20 MG/1
TABLET ORAL
Qty: 90 TABLET | Refills: 0 | OUTPATIENT
Start: 2021-10-04

## 2021-10-04 RX ORDER — CITALOPRAM HYDROBROMIDE 20 MG/1
20 TABLET ORAL DAILY
Qty: 90 TABLET | Refills: 1 | Status: SHIPPED | OUTPATIENT
Start: 2021-10-04 | End: 2022-07-12

## 2021-10-04 ASSESSMENT — MIFFLIN-ST. JEOR: SCORE: 1438.92

## 2021-10-04 NOTE — TELEPHONE ENCOUNTER
"Requested Prescriptions   Pending Prescriptions Disp Refills    citalopram (CELEXA) 20 MG tablet [Pharmacy Med Name: Citalopram Hydrobromide Oral Tablet 20 MG] 90 tablet 0     Sig: Take 1 tablet (20 mg) by mouth once daily.        SSRIs Protocol Failed - 10/4/2021  2:00 AM        Failed - PHQ-9 score less than 5 in past 6 months     Please review last PHQ-9 score.           Passed - Medication is active on med list        Passed - Patient is age 18 or older        Passed - No active pregnancy on record        Passed - No positive pregnancy test in last 12 months        Passed - Recent (6 mo) or future (30 days) visit within the authorizing provider's specialty     Patient had office visit in the last 6 months or has a visit in the next 30 days with authorizing provider or within the authorizing provider's specialty.  See \"Patient Info\" tab in inbasket, or \"Choose Columns\" in Meds & Orders section of the refill encounter.                     "

## 2022-05-04 ENCOUNTER — OFFICE VISIT (OUTPATIENT)
Dept: FAMILY MEDICINE | Facility: CLINIC | Age: 77
End: 2022-05-04
Payer: COMMERCIAL

## 2022-05-04 VITALS
HEIGHT: 66 IN | WEIGHT: 201.7 LBS | HEART RATE: 99 BPM | RESPIRATION RATE: 18 BRPM | DIASTOLIC BLOOD PRESSURE: 79 MMHG | BODY MASS INDEX: 32.42 KG/M2 | SYSTOLIC BLOOD PRESSURE: 138 MMHG | TEMPERATURE: 98.7 F | OXYGEN SATURATION: 97 %

## 2022-05-04 DIAGNOSIS — Z01.818 PREOP GENERAL PHYSICAL EXAM: Primary | ICD-10-CM

## 2022-05-04 DIAGNOSIS — G40.909 SEIZURE DISORDER (H): ICD-10-CM

## 2022-05-04 DIAGNOSIS — H26.9 CATARACT OF BOTH EYES, UNSPECIFIED CATARACT TYPE: ICD-10-CM

## 2022-05-04 DIAGNOSIS — Z12.31 VISIT FOR SCREENING MAMMOGRAM: ICD-10-CM

## 2022-05-04 PROCEDURE — 99214 OFFICE O/P EST MOD 30 MIN: CPT | Performed by: INTERNAL MEDICINE

## 2022-05-04 ASSESSMENT — PAIN SCALES - GENERAL: PAINLEVEL: NO PAIN (0)

## 2022-05-04 NOTE — PATIENT INSTRUCTIONS
Preparing for Your Surgery  Getting started  A nurse will call you to review your health history and instructions. They will give you an arrival time based on your scheduled surgery time. Please be ready to share:    Your doctor's clinic name and phone number    Your medical, surgical and anesthesia history    A list of allergies and sensitivities    A list of medicines, including herbal treatments and over-the-counter drugs    Whether the patient has a legal guardian (ask how to send us the papers in advance)  Please tell us if you're pregnant--or if there's any chance you might be pregnant. Some surgeries may injure a fetus (unborn baby), so they require a pregnancy test. Surgeries that are safe for a fetus don't always need a test, and you can choose whether to have one.   If you have a child who's having surgery, please ask for a copy of Preparing for Your Child's Surgery.    Preparing for surgery    Within 30 days of surgery: Have a pre-op exam (sometimes called an H&P, or History and Physical). This can be done at a clinic or pre-operative center.  ? If you're having a , you may not need this exam. Talk to your care team.    At your pre-op exam, talk to your care team about all medicines you take. If you need to stop any medicines before surgery, ask when to start taking them again.  ? We do this for your safety. Many medicines can make you bleed too much during surgery. Some change how well surgery (anesthesia) drugs work.    Call your insurance company to let them know you're having surgery. (If you don't have insurance, call [phone].)    Call your clinic if there's any change in your health. This includes signs of a cold or flu (sore throat, runny nose, cough, rash, fever). It also includes a scrape or scratch near the surgery site.    If you have questions on the day of surgery, call your hospital or surgery center.  COVID testing  You may need to be tested for COVID-19 before having  surgery. If so, we will give you instructions.  Eating and drinking guidelines  For your safety: Unless your surgeon tells you otherwise, follow the guidelines below.    Eat and drink as usual until 8 hours before surgery. After that, no food or milk.    Drink clear liquids until 2 hours before surgery. These are liquids you can see through, like water, Gatorade and Propel Water. You may also have black coffee and tea (no cream or milk).    Nothing by mouth within 2 hours of surgery. This includes gum, candy and breath mints.    If you drink alcohol: Stop drinking it the night before surgery.    If your care team tells you to take medicine on the morning of surgery, it's okay to take it with a sip of water.  Preventing infection    Shower or bathe the night before and morning of your surgery. Follow the instructions your clinic gave you. (If no instructions, use regular soap.)    Don't shave or clip hair near your surgery site. We'll remove the hair if needed.    Don't smoke or vape the morning of surgery. You may chew nicotine gum up to 2 hours before surgery. A nicotine patch is okay.  ? Note: Some surgeries require you to completely quit smoking and nicotine. Check with your surgeon.    Your care team will make every effort to keep you safe from infection. We will:  ? Clean our hands often with soap and water (or an alcohol-based hand rub).  ? Clean the skin at your surgery site with a special soap that kills germs.  ? Give you a special gown to keep you warm. (Cold raises the risk of infection.)  ? Wear special hair covers, masks, gowns and gloves during surgery.  ? Give antibiotic medicine, if prescribed. Not all surgeries need antibiotics.  What to bring on the day of surgery    Photo ID and insurance card    Copy of your health care directive, if you have one    Glasses and hearing aides (bring cases)  ? You can't wear contacts during surgery    Inhaler and eye drops, if you use them (tell us about these when  you arrive)    CPAP machine or breathing device, if you use them    A few personal items, if spending the night    If you have . . .  ? A pacemaker, ICD (cardiac defibrillator) or other implant: Bring the ID card.  ? An implanted stimulator: Bring the remote control.  ? A legal guardian: Bring a copy of the certified (court-stamped) guardianship papers.  Please remove any jewelry, including body piercings. Leave jewelry and other valuables at home.  If you're going home the day of surgery    You must have a responsible adult drive you home. They should stay with you overnight as well.    If you don't have someone to stay with you, and you aren't safe to go home alone, we may keep you overnight. Insurance often won't pay for this.  After surgery  If it's hard to control your pain or you need more pain medicine, please call your surgeon's office.  Questions?   If you have any questions for your care team, list them here: _________________________________________________________________________________________________________________________________________________________________________ ____________________________________ ____________________________________ ____________________________________  For informational purposes only. Not to replace the advice of your health care provider. Copyright   2003, 2019 [facility]. All rights reserved. Clinically reviewed by Lucy Novak MD. Melty [id number] - REV 07/21.
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
## 2022-06-21 ENCOUNTER — TELEPHONE (OUTPATIENT)
Dept: FAMILY MEDICINE | Facility: CLINIC | Age: 77
End: 2022-06-21
Payer: COMMERCIAL

## 2022-06-21 NOTE — TELEPHONE ENCOUNTER
PCP is not available this week.  Patient states that has hard time walking and sleeping due to the low back pain.  Patient is scheduled with Dr. Cardoza.    Laura Marie RN

## 2022-06-21 NOTE — TELEPHONE ENCOUNTER
Reason for call:  Patient reporting a symptom    Symptom or request: back pain    Duration (how long have symptoms been present): few days    Have you been treated for this before? No    Additional comments: Patient's  called stating his wife has been dealing with back pain and need to get in to see her provider    Phone Number patient can be reached at:  Other phone number:  [phone]    Best Time:  anytime    Can we leave a detailed message on this number:  YES    Call taken on 6/21/2022 at 12:13 PM by Maritza Malin

## 2022-07-10 DIAGNOSIS — F41.1 GENERALIZED ANXIETY DISORDER: ICD-10-CM

## 2022-07-10 DIAGNOSIS — F33.41 RECURRENT MAJOR DEPRESSIVE DISORDER, IN PARTIAL REMISSION (H): ICD-10-CM

## 2022-07-12 RX ORDER — CITALOPRAM HYDROBROMIDE 20 MG/1
20 TABLET ORAL DAILY
Qty: 90 TABLET | Refills: 0 | Status: SHIPPED | OUTPATIENT
Start: 2022-07-12 | End: 2022-10-17

## 2022-10-17 DIAGNOSIS — K21.9 GASTROESOPHAGEAL REFLUX DISEASE WITHOUT ESOPHAGITIS: ICD-10-CM

## 2022-10-17 DIAGNOSIS — F33.41 RECURRENT MAJOR DEPRESSIVE DISORDER, IN PARTIAL REMISSION (H): ICD-10-CM

## 2022-10-17 DIAGNOSIS — F41.1 GENERALIZED ANXIETY DISORDER: ICD-10-CM

## 2022-10-17 RX ORDER — CITALOPRAM HYDROBROMIDE 20 MG/1
20 TABLET ORAL DAILY
Qty: 30 TABLET | Refills: 0 | Status: SHIPPED | OUTPATIENT
Start: 2022-10-17 | End: 2022-11-17

## 2022-10-17 NOTE — TELEPHONE ENCOUNTER
30 day refill sent. Due for visit for refills. Please call her and help set in person or video visit up.  Thank you,  RANDALL Shaffer, NP-C  [facility]

## 2022-11-01 ENCOUNTER — ANCILLARY PROCEDURE (OUTPATIENT)
Dept: GENERAL RADIOLOGY | Facility: CLINIC | Age: 77
End: 2022-11-01
Attending: INTERNAL MEDICINE
Payer: COMMERCIAL

## 2022-11-01 ENCOUNTER — OFFICE VISIT (OUTPATIENT)
Dept: FAMILY MEDICINE | Facility: CLINIC | Age: 77
End: 2022-11-01
Payer: COMMERCIAL

## 2022-11-01 VITALS
HEART RATE: 80 BPM | OXYGEN SATURATION: 97 % | WEIGHT: 204.9 LBS | DIASTOLIC BLOOD PRESSURE: 82 MMHG | RESPIRATION RATE: 16 BRPM | HEIGHT: 65 IN | TEMPERATURE: 98.3 F | SYSTOLIC BLOOD PRESSURE: 146 MMHG | BODY MASS INDEX: 34.14 KG/M2

## 2022-11-01 DIAGNOSIS — M79.2 RADICULAR PAIN OF RIGHT UPPER EXTREMITY: Primary | ICD-10-CM

## 2022-11-01 DIAGNOSIS — M79.2 RADICULAR PAIN OF RIGHT UPPER EXTREMITY: ICD-10-CM

## 2022-11-01 DIAGNOSIS — M25.511 CHRONIC RIGHT SHOULDER PAIN: ICD-10-CM

## 2022-11-01 DIAGNOSIS — G89.29 CHRONIC RIGHT SHOULDER PAIN: ICD-10-CM

## 2022-11-01 PROBLEM — Z96.659 S/P TOTAL KNEE ARTHROPLASTY: Status: RESOLVED | Noted: 2021-03-10 | Resolved: 2022-11-01

## 2022-11-01 PROBLEM — M17.11 PRIMARY OSTEOARTHRITIS OF RIGHT KNEE: Status: RESOLVED | Noted: 2021-01-27 | Resolved: 2022-11-01

## 2022-11-01 PROBLEM — M17.0 PRIMARY OSTEOARTHRITIS OF BOTH KNEES: Status: RESOLVED | Noted: 2019-05-20 | Resolved: 2022-11-01

## 2022-11-01 PROCEDURE — 73030 X-RAY EXAM OF SHOULDER: CPT | Mod: TC | Performed by: RADIOLOGY

## 2022-11-01 PROCEDURE — 99214 OFFICE O/P EST MOD 30 MIN: CPT | Performed by: INTERNAL MEDICINE

## 2022-11-01 PROCEDURE — 72040 X-RAY EXAM NECK SPINE 2-3 VW: CPT | Mod: TC | Performed by: RADIOLOGY

## 2022-11-01 ASSESSMENT — PATIENT HEALTH QUESTIONNAIRE - PHQ9
10. IF YOU CHECKED OFF ANY PROBLEMS, HOW DIFFICULT HAVE THESE PROBLEMS MADE IT FOR YOU TO DO YOUR WORK, TAKE CARE OF THINGS AT HOME, OR GET ALONG WITH OTHER PEOPLE: VERY DIFFICULT
SUM OF ALL RESPONSES TO PHQ QUESTIONS 1-9: 6
SUM OF ALL RESPONSES TO PHQ QUESTIONS 1-9: 6

## 2022-11-01 ASSESSMENT — PAIN SCALES - GENERAL: PAINLEVEL: SEVERE PAIN (6)
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
## 2022-11-02 NOTE — TELEPHONE ENCOUNTER
Called pt to schedule appt. LVM w/ instructions to call back.  [facility]

## 2022-11-09 ENCOUNTER — HOSPITAL ENCOUNTER (OUTPATIENT)
Dept: MRI IMAGING | Facility: CLINIC | Age: 77
Discharge: HOME | End: 2022-11-09
Attending: INTERNAL MEDICINE | Admitting: INTERNAL MEDICINE
Payer: COMMERCIAL

## 2022-11-09 DIAGNOSIS — M79.2 RADICULAR PAIN OF RIGHT UPPER EXTREMITY: ICD-10-CM

## 2022-11-09 DIAGNOSIS — M79.2 RADICULAR PAIN OF RIGHT UPPER EXTREMITY: Primary | ICD-10-CM

## 2022-11-09 PROCEDURE — 72141 MRI NECK SPINE W/O DYE: CPT

## 2022-11-12 ENCOUNTER — ANCILLARY PROCEDURE (OUTPATIENT)
Dept: GENERAL RADIOLOGY | Facility: CLINIC | Age: 77
End: 2022-11-12
Attending: FAMILY MEDICINE
Payer: COMMERCIAL

## 2022-11-12 ENCOUNTER — OFFICE VISIT (OUTPATIENT)
Dept: URGENT CARE | Facility: URGENT CARE | Age: 77
End: 2022-11-12
Payer: COMMERCIAL

## 2022-11-12 VITALS
SYSTOLIC BLOOD PRESSURE: 140 MMHG | OXYGEN SATURATION: 97 % | WEIGHT: 203.7 LBS | TEMPERATURE: 97.4 F | DIASTOLIC BLOOD PRESSURE: 88 MMHG | BODY MASS INDEX: 33.53 KG/M2 | HEART RATE: 70 BPM

## 2022-11-12 DIAGNOSIS — R05.2 SUBACUTE COUGH: Primary | ICD-10-CM

## 2022-11-12 DIAGNOSIS — K21.00 GASTROESOPHAGEAL REFLUX DISEASE WITH ESOPHAGITIS WITHOUT HEMORRHAGE: ICD-10-CM

## 2022-11-12 DIAGNOSIS — R05.2 SUBACUTE COUGH: ICD-10-CM

## 2022-11-12 PROCEDURE — 99214 OFFICE O/P EST MOD 30 MIN: CPT | Performed by: FAMILY MEDICINE

## 2022-11-12 PROCEDURE — 71046 X-RAY EXAM CHEST 2 VIEWS: CPT | Mod: TC | Performed by: RADIOLOGY

## 2022-11-12 RX ORDER — FAMOTIDINE 40 MG/1
40 TABLET, FILM COATED ORAL
Qty: 90 TABLET | Refills: 3 | Status: SHIPPED | OUTPATIENT
Start: 2022-11-12 | End: 2023-01-05

## 2022-11-12 RX ORDER — PROCHLORPERAZINE MALEATE 5 MG
5 TABLET ORAL EVERY 6 HOURS PRN
Qty: 20 TABLET | Refills: 1 | Status: SHIPPED | OUTPATIENT
Start: 2022-11-12 | End: 2022-12-28

## 2022-11-12 NOTE — PROGRESS NOTES
"  Assessment & Plan      Diagnosis Comments   1. Subacute cough  XR Chest 2 Views       2. Gastroesophageal reflux disease with esophagitis without hemorrhage  famotidine (PEPCID) 40 MG tablet       Reviewed chest x-ray with patient, no acute finding.  Discussed with patient dry cough could be related to postnasal drainage, could be acid reflux. Or other causes.  Advised patient with supportive care.  Advised patient to avoid late meals.  Continue with Prilosec 40 mg in the morning.  Added famotidine 40 mg at night.    Patient will follow-up with her primary care physician within the next week or so      BMI:   Estimated body mass index is 33.53 kg/m  as calculated from the following:    Height as of 11/1/22: 1.66 m (5' 5.35\").    Weight as of this encounter: 92.4 kg (203 lb 11.2 oz).   Weight management plan: Discussed healthy diet and exercise guidelines    Work on weight loss  Regular exercise    Return in about 1 week (around 11/19/2022).    Shabbir Thornton MD  [facility]    Edmar Kam is a 77 year old accompanied by her spouse, presenting for the following health issues:  Cough  Patient reports she has been having dry cough on and off for the past 2 months or so.  She has no history of asthma, does not smoke.  Never smoked.  She has no wheezing, no chest congestion.  No fever no chills, no short of breath.  She reports the cough comes anytime more spasm, mostly dry, no chest pain,  She has no history of travel.  No pain when she take a deep breath, she denies postnasal drainage she has no sinus congestion, she has no sneezing, denies allergies.    She does have history of acid reflux she is on Prilosec 40 mg daily she denies heartburn no chest palpitation, denies being lightheaded or dizzy.  She denies sore throat, denies lower extremity edema, weight remains stable.    She denies headache, denies postnasal drainage,      Review of Systems   Constitutional, HEENT, " cardiovascular, pulmonary, GI, , musculoskeletal, neuro, skin, endocrine and psych systems are negative, except as otherwise noted.      Objective    BP (!) 164/80   Pulse 70   Temp 97.4  F (36.3  C) (Tympanic)   Wt 92.4 kg (203 lb 11.2 oz)   SpO2 97%   BMI 33.53 kg/m    Body mass index is 33.53 kg/m .  Physical Exam   GENERAL: healthy, alert and no distress  HENT: ear canals and TM's normal, nose and mouth without ulcers or lesions  NECK: no adenopathy, no asymmetry, masses, or scars and thyroid normal to palpation  RESP: lungs clear to auscultation - no rales, rhonchi or wheezes  CV: regular rate and rhythm, normal S1 S2, no S3 or S4, no murmur, click or rub, no peripheral edema and peripheral pulses strong  ABDOMEN: soft, nontender, no hepatosplenomegaly, no masses and bowel sounds normal  MS: no gross musculoskeletal defects noted, no edema  NEURO: Normal strength and tone, mentation intact and speech normal  BACK: no CVA tenderness, no paralumbar tenderness    Orders Placed This Encounter   Procedures     XR Chest 2 Views         Shabbir Thornton MD

## 2022-11-17 ENCOUNTER — OFFICE VISIT (OUTPATIENT)
Dept: NEUROSURGERY | Facility: CLINIC | Age: 77
End: 2022-11-17
Payer: COMMERCIAL

## 2022-11-17 ENCOUNTER — OFFICE VISIT (OUTPATIENT)
Dept: ORTHOPEDICS | Facility: CLINIC | Age: 77
End: 2022-11-17
Payer: COMMERCIAL

## 2022-11-17 VITALS
WEIGHT: 205.5 LBS | HEIGHT: 65 IN | DIASTOLIC BLOOD PRESSURE: 84 MMHG | SYSTOLIC BLOOD PRESSURE: 132 MMHG | BODY MASS INDEX: 34.24 KG/M2

## 2022-11-17 VITALS
BODY MASS INDEX: 34.16 KG/M2 | WEIGHT: 205 LBS | SYSTOLIC BLOOD PRESSURE: 131 MMHG | RESPIRATION RATE: 18 BRPM | DIASTOLIC BLOOD PRESSURE: 61 MMHG | HEIGHT: 65 IN | HEART RATE: 72 BPM

## 2022-11-17 DIAGNOSIS — F41.1 GENERALIZED ANXIETY DISORDER: ICD-10-CM

## 2022-11-17 DIAGNOSIS — M79.2 RADICULAR PAIN OF RIGHT UPPER EXTREMITY: ICD-10-CM

## 2022-11-17 DIAGNOSIS — M79.601 RIGHT ARM PAIN: Primary | ICD-10-CM

## 2022-11-17 DIAGNOSIS — R20.0 RIGHT ARM NUMBNESS: ICD-10-CM

## 2022-11-17 DIAGNOSIS — F33.41 RECURRENT MAJOR DEPRESSIVE DISORDER, IN PARTIAL REMISSION (H): ICD-10-CM

## 2022-11-17 DIAGNOSIS — M48.02 CERVICAL SPINAL STENOSIS: Primary | ICD-10-CM

## 2022-11-17 DIAGNOSIS — M54.12 CERVICAL RADICULOPATHY: ICD-10-CM

## 2022-11-17 DIAGNOSIS — R29.898 RIGHT ARM WEAKNESS: ICD-10-CM

## 2022-11-17 PROCEDURE — 99213 OFFICE O/P EST LOW 20 MIN: CPT | Performed by: ORTHOPAEDIC SURGERY

## 2022-11-17 PROCEDURE — 99203 OFFICE O/P NEW LOW 30 MIN: CPT | Performed by: NURSE PRACTITIONER

## 2022-11-17 RX ORDER — CITALOPRAM HYDROBROMIDE 20 MG/1
20 TABLET ORAL DAILY
Qty: 30 TABLET | Refills: 0 | Status: SHIPPED | OUTPATIENT
Start: 2022-11-17 | End: 2022-12-15

## 2022-11-17 ASSESSMENT — PAIN SCALES - GENERAL: PAINLEVEL: EXTREME PAIN (9)

## 2022-11-17 NOTE — PROGRESS NOTES
[facility]  Neurosurgery Clinic Visit      CC: neck and arm pain    Primary care Provider: Shanel Ramirez    Reason For Visit:   I was asked by Dr. Edmund Cardoza to consult on the patient for radicular pain of right upper extremity.    HPI: Negin Jonas is a 77 year old female with a 2 month history of right-sided neck pain as well as right shoulder and arm pain. She denies injury at the onset. She reports right arm pain. She denies any left-sided symptoms. She reports right index and long finger numbness. She reports weakness in her right arm. She reports inability to complete ROM of her right shoulder due to pain. She states symptoms are getting progressively worse. She has not had any recent treatment. She has tried ibuprofen for pain management.     Past Medical History:   Diagnosis Date     Advanced directives, counseling/discussion 3/7/2012     Anxiety attack 2006    EEG normal, improved with lamictal     Chronic rhinitis 1/31/2011     Closed head injury 2004    subarachnoid hemorrage/cerebral contusion with sequellae of significant diminished short term memory, word finding difficulties and emotional lability, was in coma for 2 months, on seizure prophylaxsis with lamictal, f/b neuro annually     Generalized anxiety disorder      Major depression      Major depression in complete remission (H) 1/31/2011    was seen by psychiatrist in past, has tried Effexor and Seroquel and Hiram's Wart     Migraine headache     resolved s/p closed head injury     Panic attacks      Psoriasis 1/31/2011     Short-term memory loss 2004    secondary to closed head injury     Subarachnoid hemorrhage following injury with loss of consciousness and death due to other cause prior to regaining consciousness, initial encounter 6/1/2004       Past Medical History reviewed with patient during visit.    Past Surgical History:   Procedure Laterality Date     ARTHROPLASTY KNEE Right 3/10/2021     Procedure: ARTHROPLASTY, KNEE, TOTAL;  Surgeon: Hamilton Martinez MD;  Location: [facility]     ESOPHAGOSCOPY, GASTROSCOPY, DUODENOSCOPY (EGD), COMBINED N/A 12/17/2015    Procedure: COMBINED ESOPHAGOSCOPY, GASTROSCOPY, DUODENOSCOPY (EGD);  Surgeon: Duane, William Charles, MD;  Location: [facility]     ESOPHAGOSCOPY, GASTROSCOPY, DUODENOSCOPY (EGD), COMBINED N/A 12/17/2015    Procedure: COMBINED ESOPHAGOSCOPY, GASTROSCOPY, DUODENOSCOPY (EGD), BIOPSY SINGLE OR MULTIPLE;  Surgeon: Duane, William Charles, MD;  Location: [facility]     HC REMOVAL GALLBLADDER       HYSTERECTOMY, PAP NO LONGER INDICATED  1992    with ovaries     IR MISCELLANEOUS PROCEDURE  8/21/2004     RELEASE CARPAL TUNNEL BILATERAL       ZZC TOTAL KNEE ARTHROPLASTY Left 06/10/2020    DML     Past Surgical History reviewed with patient during visit.    Current Outpatient Medications   Medication     acetaminophen (TYLENOL) 325 MG tablet     citalopram (CELEXA) 20 MG tablet     famotidine (PEPCID) 40 MG tablet     fluticasone (FLONASE) 50 MCG/ACT nasal spray     lamoTRIgine (LAMICTAL) 200 MG tablet     omeprazole (PRILOSEC) 20 MG DR capsule     Probiotic Product (PROBIOTIC-10 PO)     prochlorperazine (COMPAZINE) 5 MG tablet     triamcinolone (KENALOG) 0.1 % external cream     No current facility-administered medications for this visit.       Allergies   Allergen Reactions     Codeine Sulfate      Seasonal Allergies        Social History     Socioeconomic History     Marital status:      Spouse name: None     Number of children: None     Years of education: None     Highest education level: None   Tobacco Use     Smoking status: Former     Packs/day: 1.00     Years: 25.00     Pack years: 25.00     Types: Cigarettes     Smokeless tobacco: Never     Tobacco comments:     Quit 15 years ago.   Vaping Use     Vaping Use: Never used   Substance and Sexual Activity     Alcohol use: No     Drug use: No     Sexual activity: Never       Family History   Problem Relation  "Age of Onset     C.A.D. Father 70        CABG x 3      Respiratory Father         emphysema     Psychotic Disorder Father         depression     Diabetes Mother      Psychotic Disorder Mother         depression     Diabetes Maternal Grandfather      Hypertension Sister      Anxiety Disorder Sister      Respiratory Sister         emphysema         ROS: 10 point ROS neg other than the symptoms noted above in the HPI.    Vital Signs:   /84   Ht 5' 5.35\" (1.66 m)   Wt 205 lb 8 oz (93.2 kg)   BMI 33.83 kg/m        Examination:  Constitutional:  Alert, well nourished, NAD.  Memory: recent and remote memory   HEENT: Normocephalic, atraumatic.   Pulm:  Without shortness of breath   CV:  No pitting edema of BLE.      Neurological:  Awake  Alert  Oriented x 3  Speech clear    Motor exam:   Unable to abduct right arm above shoulder level  Biceps:                        Right:  4/5   Left:  5/5  Triceps:                       Right:  4/5   Left:  5/5  Intrinsics:                     Right:  4-/5   Left:  5/5    Sensation normal to bilateral upper and lower extremities  Muscle tone to bilateral upper and lower extremities   Gait: Able to stand from a seated position. Normal non-antalgic, non-myelopathic gait.  Able to heel/toe walk without loss of balance    Cervical examination reveals good range of motion.  No tenderness to palpation of the cervical spine or paraspinous muscles bilaterally. Not able to complete active ROM of right shoulder. Not able to complete passive ROM of right shoulder.     Imaging:   Cervical MRI 11/9/2022  IMPRESSION:  1. Diffuse degenerative change of the cervical spine as detailed above.  2. No spinal canal stenosis of the cervical spine.  3. Moderate neural foraminal stenosis on the right at C3-C4 and on the right at C4-C5.    Assessment/Plan:   Cervicalgia  Right shoulder pain  Right arm pain  Right arm weakness  Right hand numbness    Will obtain RUE EMG at this time. Will have her seen " Orthopedics for her right shoulder as well. She verbalized understanding and agreement.    Patient Instructions   -Right upper extremity EMG. They will contact you to schedule.  -Orthopedics for right shoulder pain.   -Please contact our clinic with questions or concerns at [phone].        Liss Mejia, [facility]  [address] [place] [id number]  Tel [phone]  Fax [phone]

## 2022-11-17 NOTE — PROGRESS NOTES
Negin Jonas is a 77 year old female who is seen in consultation at the request of Liss Mejia for right neck and arm pain.  Patient has pain from her neck down to her fingers on the right side.  She reports numbness of the index and long fingers.  She reported weakness in her right arm.  She has pain and weakness with trying to raise her right arm overhead.  She has constant pain in the arm rated up to 7 out of 10.  She has had evaluation by neurosurgery clinic where they are seeing cervicalgia with right arm numbness and weakness and pain.  They have referred for an EMG and referred to us for evaluation of the shoulder.    MRI scan 11/9/22 does show moderate right foraminal stenosis at C3-4 and C4-5.    Past Medical History:   Diagnosis Date     Advanced directives, counseling/discussion 3/7/2012     Anxiety attack 2006    EEG normal, improved with lamictal     Chronic rhinitis 1/31/2011     Closed head injury 2004    subarachnoid hemorrage/cerebral contusion with sequellae of significant diminished short term memory, word finding difficulties and emotional lability, was in coma for 2 months, on seizure prophylaxsis with lamictal, f/b neuro annually     Generalized anxiety disorder      Major depression      Major depression in complete remission (H) 1/31/2011    was seen by psychiatrist in past, has tried Effexor and Seroquel and Hiram's Wart     Migraine headache     resolved s/p closed head injury     Panic attacks      Psoriasis 1/31/2011     Short-term memory loss 2004    secondary to closed head injury     Subarachnoid hemorrhage following injury with loss of consciousness and death due to other cause prior to regaining consciousness, initial encounter 6/1/2004       Past Surgical History:   Procedure Laterality Date     ARTHROPLASTY KNEE Right 3/10/2021    Procedure: ARTHROPLASTY, KNEE, TOTAL;  Surgeon: Hamilton Martinez MD;  Location: [facility]     ESOPHAGOSCOPY, GASTROSCOPY, DUODENOSCOPY  (EGD), COMBINED N/A 12/17/2015    Procedure: COMBINED ESOPHAGOSCOPY, GASTROSCOPY, DUODENOSCOPY (EGD);  Surgeon: Duane, William Charles, MD;  Location: [facility]     ESOPHAGOSCOPY, GASTROSCOPY, DUODENOSCOPY (EGD), COMBINED N/A 12/17/2015    Procedure: COMBINED ESOPHAGOSCOPY, GASTROSCOPY, DUODENOSCOPY (EGD), BIOPSY SINGLE OR MULTIPLE;  Surgeon: Duane, William Charles, MD;  Location: [facility]     HC REMOVAL GALLBLADDER       HYSTERECTOMY, PAP NO LONGER INDICATED  1992    with ovaries     IR MISCELLANEOUS PROCEDURE  8/21/2004     RELEASE CARPAL TUNNEL BILATERAL       ZZC TOTAL KNEE ARTHROPLASTY Left 06/10/2020    DML       Family History   Problem Relation Age of Onset     C.A.D. Father 70        CABG x 3      Respiratory Father         emphysema     Psychotic Disorder Father         depression     Diabetes Mother      Psychotic Disorder Mother         depression     Diabetes Maternal Grandfather      Hypertension Sister      Anxiety Disorder Sister      Respiratory Sister         emphysema       Social History     Socioeconomic History     Marital status:      Spouse name: Not on file     Number of children: Not on file     Years of education: Not on file     Highest education level: Not on file   Occupational History     Not on file   Tobacco Use     Smoking status: Former     Packs/day: 1.00     Years: 25.00     Pack years: 25.00     Types: Cigarettes     Smokeless tobacco: Never     Tobacco comments:     Quit 15 years ago.   Vaping Use     Vaping Use: Never used   Substance and Sexual Activity     Alcohol use: No     Drug use: No     Sexual activity: Never   Other Topics Concern     Parent/sibling w/ CABG, MI or angioplasty before 65F 55M? Not Asked   Social History Narrative     Not on file     Social Determinants of Health     Financial Resource Strain: Not on file   Food Insecurity: Not on file   Transportation Needs: Not on file   Physical Activity: Not on file   Stress: Not on file   Social Connections: Not on  file   Intimate Partner Violence: Not on file   Housing Stability: Not on file       Current Outpatient Medications   Medication Sig Dispense Refill     acetaminophen (TYLENOL) 325 MG tablet Take 2 tablets (650 mg) by mouth every 4 hours as needed for other (mild pain) 100 tablet 0     citalopram (CELEXA) 20 MG tablet Take 1 tablet (20 mg) by mouth daily **due for visit for further refills** 30 tablet 0     famotidine (PEPCID) 40 MG tablet Take 1 tablet (40 mg) by mouth nightly as needed for heartburn 90 tablet 3     fluticasone (FLONASE) 50 MCG/ACT nasal spray INSTILL 1 - 2 SPRAYS INTO BOTH NOSTRILS DAILY FOR ALLERGIES 48 g 1     lamoTRIgine (LAMICTAL) 200 MG tablet Take 1 tablet (200 mg) by mouth daily Reported on 2/15/2017 90 tablet 3     omeprazole (PRILOSEC) 20 MG DR capsule Take 2 capsules (40 mg) by mouth daily 180 capsule 0     Probiotic Product (PROBIOTIC-10 PO)        prochlorperazine (COMPAZINE) 5 MG tablet Take 1 tablet (5 mg) by mouth every 6 hours as needed for nausea or vomiting 20 tablet 1     triamcinolone (KENALOG) 0.1 % external cream apply topically twice a day as needed for a maximum of 14 days 80 g 1       Allergies   Allergen Reactions     Codeine Sulfate      Seasonal Allergies        REVIEW OF SYSTEMS:  CONSTITUTIONAL:  NEGATIVE for fever, chills, change in weight, not feeling tired  SKIN:  NEGATIVE for worrisome rashes, no skin lumps, no skin ulcers and no non-healing wounds  EYES:  NEGATIVE for vision changes or irritation.  ENT/MOUTH:  NEGATIVE.  No hearing loss, no hoarseness, no difficulty swallowing.  RESP:  NEGATIVE. No cough or shortness of breath.  CV:  NEGATIVE for chest pain, palpitations or peripheral edema  GI:  NEGATIVE for nausea, abdominal pain, heartburn, or change in bowel habits  :  Negative. No dysuria, no hematuria  MUSCULOSKELETAL:  See HPI above  NEURO:  NEGATIVE . No headaches, no dizziness,  no numbness  ENDOCRINE:  NEGATIVE for temperature intolerance, skin/hair  "changes  HEME/ALLERGY/IMMUNE:  NEGATIVE for bleeding problems  PSYCHIATRIC:  NEGATIVE. no anxiety, no depression.     Exam:  Vitals: /61   Pulse 72   Resp 18   Ht 1.657 m (5' 5.25\")   Wt 93 kg (205 lb)   BMI 33.85 kg/m    BMI= Body mass index is 33.85 kg/m .  Constitutional:  healthy, alert and no distress  Neuro: Alert and Oriented x 3, no focal defects.  Psych: Affect normal   Respiratory: Breathing not labored.  Cardiovascular: normal peripheral pulses  Lymph: no adenopathy  Skin: No rashes,worrisome lesions or skin problems  She has tenderness of the right side of the neck.  She had no tenderness in the subacromial space or AC joint on the right shoulder.  Her pattern of pain from the neck down to the hand indicates neck as a source.  She has had diffuse weakness in the right arm including rotator cuff muscles, biceps, triceps, intrinsics.  This indicates that the lesion is not in the shoulder but more likely from cervical radiculopathy.      Assessment:  Right arm pain due to cervical radiculopathy.  Plan:  See neurosurgery again for treatment.    "

## 2022-11-17 NOTE — TELEPHONE ENCOUNTER
Refilled x 1.  Needs visit (OFV or video visit) before any further refill.    Writer called pt and LVM to call office and confirm appt on 5/22/2019

## 2022-11-17 NOTE — PROGRESS NOTES
"Negin Jonas is a 77 year old female who presents for:  Chief Complaint   Patient presents with     Neurologic Problem     Cervical Radiculopathy        Initial Vitals:  /84   Ht 5' 5.35\" (1.66 m)   Wt 205 lb 8 oz (93.2 kg)   BMI 33.83 kg/m   Estimated body mass index is 33.83 kg/m  as calculated from the following:    Height as of this encounter: 5' 5.35\" (1.66 m).    Weight as of this encounter: 205 lb 8 oz (93.2 kg).. Body surface area is 2.07 meters squared. BP completed using cuff size: regular  Extreme Pain (9)    Nursing Comments: Cervical Radiculopathy     Lolis Mariscal    "

## 2022-11-17 NOTE — PATIENT INSTRUCTIONS
-Right upper extremity EMG. They will contact you to schedule.  -Orthopedics for right shoulder pain.   -Please contact our clinic with questions or concerns at [phone].

## 2022-11-17 NOTE — LETTER
"    11/17/2022         RE: Negin Jonas  [address]  [place] [address]        Dear Colleague,    Thank you for referring your patient, Negin Jonas, to the [facility] NEUROSURGERY [facility]INIC [place]. Please see a copy of my visit note below.    Negin Jonas is a 77 year old female who presents for:  Chief Complaint   Patient presents with     Neurologic Problem     Cervical Radiculopathy        Initial Vitals:  /84   Ht 5' 5.35\" (1.66 m)   Wt 205 lb 8 oz (93.2 kg)   BMI 33.83 kg/m   Estimated body mass index is 33.83 kg/m  as calculated from the following:    Height as of this encounter: 5' 5.35\" (1.66 m).    Weight as of this encounter: 205 lb 8 oz (93.2 kg).. Body surface area is 2.07 meters squared. BP completed using cuff size: regular  Extreme Pain (9)    Nursing Comments: Cervical Radiculopathy     Lolis Mariscal      [facility] Neurosurgery  Neurosurgery Clinic Visit      CC: neck and arm pain    Primary care Provider: Shanel Ramirez    Reason For Visit:   I was asked by Dr. Edmund Cardoza to consult on the patient for radicular pain of right upper extremity.    HPI: Negin Jonas is a 77 year old female with a 2 month history of right-sided neck pain as well as right shoulder and arm pain. She denies injury at the onset. She reports right arm pain. She denies any left-sided symptoms. She reports right index and long finger numbness. She reports weakness in her right arm. She reports inability to complete ROM of her right shoulder due to pain. She states symptoms are getting progressively worse. She has not had any recent treatment. She has tried ibuprofen for pain management.     Past Medical History:   Diagnosis Date     Advanced directives, counseling/discussion 3/7/2012     Anxiety attack 2006    EEG normal, improved with lamictal     Chronic rhinitis 1/31/2011     Closed head injury 2004    subarachnoid hemorrage/cerebral contusion with " sequellae of significant diminished short term memory, word finding difficulties and emotional lability, was in coma for 2 months, on seizure prophylaxsis with lamictal, f/b neuro annually     Generalized anxiety disorder      Major depression      Major depression in complete remission (H) 1/31/2011    was seen by psychiatrist in past, has tried Effexor and Seroquel and Hiram's Wart     Migraine headache     resolved s/p closed head injury     Panic attacks      Psoriasis 1/31/2011     Short-term memory loss 2004    secondary to closed head injury     Subarachnoid hemorrhage following injury with loss of consciousness and death due to other cause prior to regaining consciousness, initial encounter 6/1/2004       Past Medical History reviewed with patient during visit.    Past Surgical History:   Procedure Laterality Date     ARTHROPLASTY KNEE Right 3/10/2021    Procedure: ARTHROPLASTY, KNEE, TOTAL;  Surgeon: Hamilton Martinez MD;  [facility]     ESOPHAGOSCOPY, GASTROSCOPY, DUODENOSCOPY (EGD), COMBINED N/A 12/17/2015    Procedure: COMBINED ESOPHAGOSCOPY, GASTROSCOPY, DUODENOSCOPY (EGD);  Surgeon: Duane, William Charles, MD;  Location: [facility]     ESOPHAGOSCOPY, GASTROSCOPY, DUODENOSCOPY (EGD), COMBINED N/A 12/17/2015    Procedure: COMBINED ESOPHAGOSCOPY, GASTROSCOPY, DUODENOSCOPY (EGD), BIOPSY SINGLE OR MULTIPLE;  Surgeon: Duane, William Charles, MD;  Location: [facility]     HC REMOVAL GALLBLADDER       HYSTERECTOMY, PAP NO LONGER INDICATED  1992    with ovaries     IR MISCELLANEOUS PROCEDURE  8/21/2004     RELEASE CARPAL TUNNEL BILATERAL       ZZC TOTAL KNEE ARTHROPLASTY Left 06/10/2020    DML     Past Surgical History reviewed with patient during visit.    Current Outpatient Medications   Medication     acetaminophen (TYLENOL) 325 MG tablet     citalopram (CELEXA) 20 MG tablet     famotidine (PEPCID) 40 MG tablet     fluticasone (FLONASE) 50 MCG/ACT nasal spray     lamoTRIgine (LAMICTAL) 200 MG tablet      "omeprazole (PRILOSEC) 20 MG DR capsule     Probiotic Product (PROBIOTIC-10 PO)     prochlorperazine (COMPAZINE) 5 MG tablet     triamcinolone (KENALOG) 0.1 % external cream     No current facility-administered medications for this visit.       Allergies   Allergen Reactions     Codeine Sulfate      Seasonal Allergies        Social History     Socioeconomic History     Marital status:      Spouse name: None     Number of children: None     Years of education: None     Highest education level: None   Tobacco Use     Smoking status: Former     Packs/day: 1.00     Years: 25.00     Pack years: 25.00     Types: Cigarettes     Smokeless tobacco: Never     Tobacco comments:     Quit 15 years ago.   Vaping Use     Vaping Use: Never used   Substance and Sexual Activity     Alcohol use: No     Drug use: No     Sexual activity: Never       Family History   Problem Relation Age of Onset     C.A.D. Father 70        CABG x 3      Respiratory Father         emphysema     Psychotic Disorder Father         depression     Diabetes Mother      Psychotic Disorder Mother         depression     Diabetes Maternal Grandfather      Hypertension Sister      Anxiety Disorder Sister      Respiratory Sister         emphysema         ROS: 10 point ROS neg other than the symptoms noted above in the HPI.    Vital Signs:   /84   Ht 5' 5.35\" (1.66 m)   Wt 205 lb 8 oz (93.2 kg)   BMI 33.83 kg/m        Examination:  Constitutional:  Alert, well nourished, NAD.  Memory: recent and remote memory   HEENT: Normocephalic, atraumatic.   Pulm:  Without shortness of breath   CV:  No pitting edema of BLE.      Neurological:  Awake  Alert  Oriented x 3  Speech clear    Motor exam:   Unable to abduct right arm above shoulder level  Biceps:                        Right:  4/5   Left:  5/5  Triceps:                       Right:  4/5   Left:  5/5  Intrinsics:                     Right:  4-/5   Left:  5/5    Sensation normal to bilateral upper and " lower extremities  Muscle tone to bilateral upper and lower extremities   Gait: Able to stand from a seated position. Normal non-antalgic, non-myelopathic gait.  Able to heel/toe walk without loss of balance    Cervical examination reveals good range of motion.  No tenderness to palpation of the cervical spine or paraspinous muscles bilaterally. Not able to complete active ROM of right shoulder. Not able to complete passive ROM of right shoulder.     Imaging:   Cervical MRI 11/9/2022  IMPRESSION:  1. Diffuse degenerative change of the cervical spine as detailed above.  2. No spinal canal stenosis of the cervical spine.  3. Moderate neural foraminal stenosis on the right at C3-C4 and on the right at C4-C5.    Assessment/Plan:   Cervicalgia  Right shoulder pain  Right arm pain  Right arm weakness  Right hand numbness    Will obtain RUE EMG at this time. Will have her seen Orthopedics for her right shoulder as well. She verbalized understanding and agreement.    Patient Instructions   -Right upper extremity EMG. They will contact you to schedule.  -Orthopedics for right shoulder pain.   -Please contact our clinic with questions or concerns at [phone].        Liss Mejia CNP  [facility] Neurosurgery  [address] [place] [id number]  Tel [phone]  Fax [phone]        Again, thank you for allowing me to participate in the care of your patient.        Sincerely,        Liss Mejia NP

## 2022-11-17 NOTE — LETTER
11/17/2022         RE: Negin Jonas  [address]  [place] [address]        Dear Colleague,    Thank you for referring your patient, Negin Jonas, to the [facility]. Please see a copy of my visit note below.    Negin Jonas is a 77 year old female who is seen in consultation at the request of Liss Mejia for right neck and arm pain.  Patient has pain from her neck down to her fingers on the right side.  She reports numbness of the index and long fingers.  She reported weakness in her right arm.  She has pain and weakness with trying to raise her right arm overhead.  She has constant pain in the arm rated up to 7 out of 10.  She has had evaluation by neurosurgery clinic where they are seeing cervicalgia with right arm numbness and weakness and pain.  They have referred for an EMG and referred to us for evaluation of the shoulder.    MRI scan 11/9/22 does show moderate right foraminal stenosis at C3-4 and C4-5.    Past Medical History:   Diagnosis Date     Advanced directives, counseling/discussion 3/7/2012     Anxiety attack 2006    EEG normal, improved with lamictal     Chronic rhinitis 1/31/2011     Closed head injury 2004    subarachnoid hemorrage/cerebral contusion with sequellae of significant diminished short term memory, word finding difficulties and emotional lability, was in coma for 2 months, on seizure prophylaxsis with lamictal, f/b neuro annually     Generalized anxiety disorder      Major depression      Major depression in complete remission (H) 1/31/2011    was seen by psychiatrist in past, has tried Effexor and Seroquel and Hiram's Wart     Migraine headache     resolved s/p closed head injury     Panic attacks      Psoriasis 1/31/2011     Short-term memory loss 2004    secondary to closed head injury     Subarachnoid hemorrhage following injury with loss of consciousness and death due to other cause prior to regaining  consciousness, initial encounter 6/1/2004       Past Surgical History:   Procedure Laterality Date     ARTHROPLASTY KNEE Right 3/10/2021    Procedure: ARTHROPLASTY, KNEE, TOTAL;  Surgeon: Hamilton Martinez MD;  [facility]     ESOPHAGOSCOPY, GASTROSCOPY, DUODENOSCOPY (EGD), COMBINED N/A 12/17/2015    Procedure: COMBINED ESOPHAGOSCOPY, GASTROSCOPY, DUODENOSCOPY (EGD);  Surgeon: Duane, William Charles, MD;  [facility]     ESOPHAGOSCOPY, GASTROSCOPY, DUODENOSCOPY (EGD), COMBINED N/A 12/17/2015    Procedure: COMBINED ESOPHAGOSCOPY, GASTROSCOPY, DUODENOSCOPY (EGD), BIOPSY SINGLE OR MULTIPLE;  Surgeon: Duane, William Charles, MD;  Location: [facility]     HC REMOVAL GALLBLADDER       HYSTERECTOMY, PAP NO LONGER INDICATED  1992    with ovaries     IR MISCELLANEOUS PROCEDURE  8/21/2004     RELEASE CARPAL TUNNEL BILATERAL       ZZC TOTAL KNEE ARTHROPLASTY Left 06/10/2020    DML       Family History   Problem Relation Age of Onset     C.A.D. Father 70        CABG x 3      Respiratory Father         emphysema     Psychotic Disorder Father         depression     Diabetes Mother      Psychotic Disorder Mother         depression     Diabetes Maternal Grandfather      Hypertension Sister      Anxiety Disorder Sister      Respiratory Sister         emphysema       Social History     Socioeconomic History     Marital status:      Spouse name: Not on file     Number of children: Not on file     Years of education: Not on file     Highest education level: Not on file   Occupational History     Not on file   Tobacco Use     Smoking status: Former     Packs/day: 1.00     Years: 25.00     Pack years: 25.00     Types: Cigarettes     Smokeless tobacco: Never     Tobacco comments:     Quit 15 years ago.   Vaping Use     Vaping Use: Never used   Substance and Sexual Activity     Alcohol use: No     Drug use: No     Sexual activity: Never   Other Topics Concern     Parent/sibling w/ CABG, MI or angioplasty before 65F 55M? Not  Asked   Social History Narrative     Not on file     Social Determinants of Health     Financial Resource Strain: Not on file   Food Insecurity: Not on file   Transportation Needs: Not on file   Physical Activity: Not on file   Stress: Not on file   Social Connections: Not on file   Intimate Partner Violence: Not on file   Housing Stability: Not on file       Current Outpatient Medications   Medication Sig Dispense Refill     acetaminophen (TYLENOL) 325 MG tablet Take 2 tablets (650 mg) by mouth every 4 hours as needed for other (mild pain) 100 tablet 0     citalopram (CELEXA) 20 MG tablet Take 1 tablet (20 mg) by mouth daily **due for visit for further refills** 30 tablet 0     famotidine (PEPCID) 40 MG tablet Take 1 tablet (40 mg) by mouth nightly as needed for heartburn 90 tablet 3     fluticasone (FLONASE) 50 MCG/ACT nasal spray INSTILL 1 - 2 SPRAYS INTO BOTH NOSTRILS DAILY FOR ALLERGIES 48 g 1     lamoTRIgine (LAMICTAL) 200 MG tablet Take 1 tablet (200 mg) by mouth daily Reported on 2/15/2017 90 tablet 3     omeprazole (PRILOSEC) 20 MG DR capsule Take 2 capsules (40 mg) by mouth daily 180 capsule 0     Probiotic Product (PROBIOTIC-10 PO)        prochlorperazine (COMPAZINE) 5 MG tablet Take 1 tablet (5 mg) by mouth every 6 hours as needed for nausea or vomiting 20 tablet 1     triamcinolone (KENALOG) 0.1 % external cream apply topically twice a day as needed for a maximum of 14 days 80 g 1       Allergies   Allergen Reactions     Codeine Sulfate      Seasonal Allergies        REVIEW OF SYSTEMS:  CONSTITUTIONAL:  NEGATIVE for fever, chills, change in weight, not feeling tired  SKIN:  NEGATIVE for worrisome rashes, no skin lumps, no skin ulcers and no non-healing wounds  EYES:  NEGATIVE for vision changes or irritation.  ENT/MOUTH:  NEGATIVE.  No hearing loss, no hoarseness, no difficulty swallowing.  RESP:  NEGATIVE. No cough or shortness of breath.  CV:  NEGATIVE for chest pain, palpitations or peripheral  "edema  GI:  NEGATIVE for nausea, abdominal pain, heartburn, or change in bowel habits  :  Negative. No dysuria, no hematuria  MUSCULOSKELETAL:  See HPI above  NEURO:  NEGATIVE . No headaches, no dizziness,  no numbness  ENDOCRINE:  NEGATIVE for temperature intolerance, skin/hair changes  HEME/ALLERGY/IMMUNE:  NEGATIVE for bleeding problems  PSYCHIATRIC:  NEGATIVE. no anxiety, no depression.     Exam:  Vitals: /61   Pulse 72   Resp 18   Ht 1.657 m (5' 5.25\")   Wt 93 kg (205 lb)   BMI 33.85 kg/m    BMI= Body mass index is 33.85 kg/m .  Constitutional:  healthy, alert and no distress  Neuro: Alert and Oriented x 3, no focal defects.  Psych: Affect normal   Respiratory: Breathing not labored.  Cardiovascular: normal peripheral pulses  Lymph: no adenopathy  Skin: No rashes,worrisome lesions or skin problems  She has tenderness of the right side of the neck.  She had no tenderness in the subacromial space or AC joint on the right shoulder.  Her pattern of pain from the neck down to the hand indicates neck as a source.  She has had diffuse weakness in the right arm including rotator cuff muscles, biceps, triceps, intrinsics.  This indicates that the lesion is not in the shoulder but more likely from cervical radiculopathy.      Assessment:  Right arm pain due to cervical radiculopathy.  Plan:  See neurosurgery again for treatment.        Again, thank you for allowing me to participate in the care of your patient.        Sincerely,        Hamilton Martinez MD    "

## 2022-12-05 ENCOUNTER — OFFICE VISIT (OUTPATIENT)
Dept: NEUROSURGERY | Facility: CLINIC | Age: 77
End: 2022-12-05
Attending: ORTHOPAEDIC SURGERY
Payer: COMMERCIAL

## 2022-12-05 VITALS — SYSTOLIC BLOOD PRESSURE: 158 MMHG | DIASTOLIC BLOOD PRESSURE: 53 MMHG | OXYGEN SATURATION: 98 % | HEART RATE: 89 BPM

## 2022-12-05 DIAGNOSIS — M48.02 CERVICAL SPINAL STENOSIS: ICD-10-CM

## 2022-12-05 DIAGNOSIS — M54.12 CERVICAL RADICULOPATHY: Primary | ICD-10-CM

## 2022-12-05 DIAGNOSIS — Z01.818 PRE-OP TESTING: Primary | ICD-10-CM

## 2022-12-05 PROCEDURE — 99214 OFFICE O/P EST MOD 30 MIN: CPT | Performed by: NEUROLOGICAL SURGERY

## 2022-12-05 ASSESSMENT — PAIN SCALES - GENERAL: PAINLEVEL: SEVERE PAIN (7)

## 2022-12-05 NOTE — PROGRESS NOTES
Negin Jonas is a 77 year old female with a 2 month history of right-sided neck pain as well as right shoulder and arm pain. She denies injury at the onset. She reports right arm pain. She denies any left-sided symptoms. She reports right index and long finger numbness. She reports weakness in her right arm. She reports inability to complete ROM of her right shoulder due to pain. She states symptoms are getting progressively worse. She has not had any recent treatment. She has tried ibuprofen for pain management.     Returns for follow up.  She saw Dr. Martinez who felt symptoms were related to cervical radiculopathy and not shoulder pathology.      Past Medical History:   Diagnosis Date     Advanced directives, counseling/discussion 3/7/2012     Anxiety attack 2006    EEG normal, improved with lamictal     Chronic rhinitis 1/31/2011     Closed head injury 2004    subarachnoid hemorrage/cerebral contusion with sequellae of significant diminished short term memory, word finding difficulties and emotional lability, was in coma for 2 months, on seizure prophylaxsis with lamictal, f/b neuro annually     Generalized anxiety disorder      Major depression      Major depression in complete remission (H) 1/31/2011    was seen by psychiatrist in past, has tried Effexor and Seroquel and Hiram's Wart     Migraine headache     resolved s/p closed head injury     Panic attacks      Psoriasis 1/31/2011     Short-term memory loss 2004    secondary to closed head injury     Subarachnoid hemorrhage following injury with loss of consciousness and death due to other cause prior to regaining consciousness, initial encounter 6/1/2004       Past Medical History reviewed with patient during visit.    Past Surgical History:   Procedure Laterality Date     ARTHROPLASTY KNEE Right 3/10/2021    Procedure: ARTHROPLASTY, KNEE, TOTAL;  Surgeon: Hamilton Martinez MD;  Location:  OR     ESOPHAGOSCOPY, GASTROSCOPY, DUODENOSCOPY  (EGD), COMBINED N/A 12/17/2015    Procedure: COMBINED ESOPHAGOSCOPY, GASTROSCOPY, DUODENOSCOPY (EGD);  Surgeon: Duane, William Charles, MD;  [facility]     ESOPHAGOSCOPY, GASTROSCOPY, DUODENOSCOPY (EGD), COMBINED N/A 12/17/2015    Procedure: COMBINED ESOPHAGOSCOPY, GASTROSCOPY, DUODENOSCOPY (EGD), BIOPSY SINGLE OR MULTIPLE;  Surgeon: Duane, William Charles, MD;  Location: [facility]     HC REMOVAL GALLBLADDER       HYSTERECTOMY, PAP NO LONGER INDICATED  1992    with ovaries     IR MISCELLANEOUS PROCEDURE  8/21/2004     RELEASE CARPAL TUNNEL BILATERAL       ZZC TOTAL KNEE ARTHROPLASTY Left 06/10/2020    DML     Past Surgical History reviewed with patient during visit.    Current Outpatient Medications   Medication     acetaminophen (TYLENOL) 325 MG tablet     citalopram (CELEXA) 20 MG tablet     famotidine (PEPCID) 40 MG tablet     fluticasone (FLONASE) 50 MCG/ACT nasal spray     lamoTRIgine (LAMICTAL) 200 MG tablet     omeprazole (PRILOSEC) 20 MG DR capsule     Probiotic Product (PROBIOTIC-10 PO)     prochlorperazine (COMPAZINE) 5 MG tablet     triamcinolone (KENALOG) 0.1 % external cream     No current facility-administered medications for this visit.       Allergies   Allergen Reactions     Codeine Sulfate      Seasonal Allergies        Social History     Socioeconomic History     Marital status:      Spouse name: None     Number of children: None     Years of education: None     Highest education level: None   Tobacco Use     Smoking status: Former     Packs/day: 1.00     Years: 25.00     Pack years: 25.00     Types: Cigarettes     Smokeless tobacco: Never     Tobacco comments:     Quit 15 years ago.   Vaping Use     Vaping Use: Never used   Substance and Sexual Activity     Alcohol use: No     Drug use: No     Sexual activity: Never       Family History   Problem Relation Age of Onset     C.A.D. Father 70        CABG x 3      Respiratory Father         emphysema     Psychotic Disorder Father          depression     Diabetes Mother      Psychotic Disorder Mother         depression     Diabetes Maternal Grandfather      Hypertension Sister      Anxiety Disorder Sister      Respiratory Sister         emphysema         ROS: 10 point ROS neg other than the symptoms noted above in the HPI.    Vital Signs:   BP (!) 158/53   Pulse 89   SpO2 98%       Examination:  Constitutional:  Alert, well nourished, NAD.  Memory: recent and remote memory   HEENT: Normocephalic, atraumatic.   Pulm:  Without shortness of breath   CV:  No pitting edema of BLE.      Neurological:  Awake  Alert  Oriented x 3  Speech clear    Motor exam:   Unable to abduct right arm above shoulder level  Biceps:                        Right:  4/5   Left:  5/5  Triceps:                       Right:  4/5   Left:  5/5  Intrinsics:                     Right:  4+/5   Left:  5/5    Sensation normal to bilateral upper and lower extremities  Muscle tone to bilateral upper and lower extremities   Gait: Able to stand from a seated position. Normal non-antalgic, non-myelopathic gait.  Able to heel/toe walk without loss of balance    Cervical examination reveals good range of motion.  No tenderness to palpation of the cervical spine or paraspinous muscles bilaterally. Not able to complete active ROM of right shoulder. Not able to complete passive ROM of right shoulder.     Imaging:   Cervical MRI 11/9/2022  IMPRESSION:  1. Diffuse degenerative change of the cervical spine as detailed above.  2. No spinal canal stenosis of the cervical spine.  3. Moderate neural foraminal stenosis on the right at C3-C4 and on the right at C4-C5.    Assessment/Plan:   Cervicalgia  Right shoulder pain  Right arm pain  Right arm weakness  Right hand numbness    Discussed options, she has too much pain and weakness to be able to participate in PT  Will plan for C3-5 ACDF  Risks and benefits discussed

## 2022-12-05 NOTE — NURSING NOTE
"Negin Jonas is a 77 year old female who presents for:  Chief Complaint   Patient presents with     Consult     Cervical stenosis        Initial Vitals:  BP (!) 158/53   Pulse 89   SpO2 98%  Estimated body mass index is 33.85 kg/m  as calculated from the following:    Height as of 11/17/22: 5' 5.25\" (1.657 m).    Weight as of 11/17/22: 205 lb (93 kg).. There is no height or weight on file to calculate BSA. BP completed using cuff size: large  Severe Pain (7)    Nursing Comments:     Fidel Alfonso MA    "

## 2022-12-05 NOTE — LETTER
12/5/2022         RE: Negin Jonas  [address]  [place] [address]        Dear Colleague,    Thank you for referring your patient, Negin Jonas, to the [facility]. Please see a copy of my visit note below.    Negin Jonas is a 77 year old female with a 2 month history of right-sided neck pain as well as right shoulder and arm pain. She denies injury at the onset. She reports right arm pain. She denies any left-sided symptoms. She reports right index and long finger numbness. She reports weakness in her right arm. She reports inability to complete ROM of her right shoulder due to pain. She states symptoms are getting progressively worse. She has not had any recent treatment. She has tried ibuprofen for pain management.     Returns for follow up.  She saw Dr. Martinez who felt symptoms were related to cervical radiculopathy and not shoulder pathology.      Past Medical History:   Diagnosis Date     Advanced directives, counseling/discussion 3/7/2012     Anxiety attack 2006    EEG normal, improved with lamictal     Chronic rhinitis 1/31/2011     Closed head injury 2004    subarachnoid hemorrage/cerebral contusion with sequellae of significant diminished short term memory, word finding difficulties and emotional lability, was in coma for 2 months, on seizure prophylaxsis with lamictal, f/b neuro annually     Generalized anxiety disorder      Major depression      Major depression in complete remission (H) 1/31/2011    was seen by psychiatrist in past, has tried Effexor and Seroquel and Hiram's Wart     Migraine headache     resolved s/p closed head injury     Panic attacks      Psoriasis 1/31/2011     Short-term memory loss 2004    secondary to closed head injury     Subarachnoid hemorrhage following injury with loss of consciousness and death due to other cause prior to regaining consciousness, initial encounter 6/1/2004       Past Medical  History reviewed with patient during visit.    Past Surgical History:   Procedure Laterality Date     ARTHROPLASTY KNEE Right 3/10/2021    Procedure: ARTHROPLASTY, KNEE, TOTAL;  Surgeon: Hamilton Martinez MD;  Location: [facility] OR     ESOPHAGOSCOPY, GASTROSCOPY, DUODENOSCOPY (EGD), COMBINED N/A 12/17/2015    Procedure: COMBINED ESOPHAGOSCOPY, GASTROSCOPY, DUODENOSCOPY (EGD);  Surgeon: Duane, William Charles, MD;  Location: [facility] OR     ESOPHAGOSCOPY, GASTROSCOPY, DUODENOSCOPY (EGD), COMBINED N/A 12/17/2015    Procedure: COMBINED ESOPHAGOSCOPY, GASTROSCOPY, DUODENOSCOPY (EGD), BIOPSY SINGLE OR MULTIPLE;  Surgeon: Duane, William Charles, MD;  Location: [facility] OR     HC REMOVAL GALLBLADDER       HYSTERECTOMY, PAP NO LONGER INDICATED  1992    with ovaries     IR MISCELLANEOUS PROCEDURE  8/21/2004     RELEASE CARPAL TUNNEL BILATERAL       ZZC TOTAL KNEE ARTHROPLASTY Left 06/10/2020    DML     Past Surgical History reviewed with patient during visit.    Current Outpatient Medications   Medication     acetaminophen (TYLENOL) 325 MG tablet     citalopram (CELEXA) 20 MG tablet     famotidine (PEPCID) 40 MG tablet     fluticasone (FLONASE) 50 MCG/ACT nasal spray     lamoTRIgine (LAMICTAL) 200 MG tablet     omeprazole (PRILOSEC) 20 MG DR capsule     Probiotic Product (PROBIOTIC-10 PO)     prochlorperazine (COMPAZINE) 5 MG tablet     triamcinolone (KENALOG) 0.1 % external cream     No current facility-administered medications for this visit.       Allergies   Allergen Reactions     Codeine Sulfate      Seasonal Allergies        Social History     Socioeconomic History     Marital status:      Spouse name: None     Number of children: None     Years of education: None     Highest education level: None   Tobacco Use     Smoking status: Former     Packs/day: 1.00     Years: 25.00     Pack years: 25.00     Types: Cigarettes     Smokeless tobacco: Never     Tobacco comments:     Quit 15 years ago.   Vaping Use     Vaping Use: Never  used   Substance and Sexual Activity     Alcohol use: No     Drug use: No     Sexual activity: Never       Family History   Problem Relation Age of Onset     C.A.D. Father 70        CABG x 3      Respiratory Father         emphysema     Psychotic Disorder Father         depression     Diabetes Mother      Psychotic Disorder Mother         depression     Diabetes Maternal Grandfather      Hypertension Sister      Anxiety Disorder Sister      Respiratory Sister         emphysema         ROS: 10 point ROS neg other than the symptoms noted above in the HPI.    Vital Signs:   BP (!) 158/53   Pulse 89   SpO2 98%       Examination:  Constitutional:  Alert, well nourished, NAD.  Memory: recent and remote memory   HEENT: Normocephalic, atraumatic.   Pulm:  Without shortness of breath   CV:  No pitting edema of BLE.      Neurological:  Awake  Alert  Oriented x 3  Speech clear    Motor exam:   Unable to abduct right arm above shoulder level  Biceps:                        Right:  4/5   Left:  5/5  Triceps:                       Right:  4/5   Left:  5/5  Intrinsics:                     Right:  4+/5   Left:  5/5    Sensation normal to bilateral upper and lower extremities  Muscle tone to bilateral upper and lower extremities   Gait: Able to stand from a seated position. Normal non-antalgic, non-myelopathic gait.  Able to heel/toe walk without loss of balance    Cervical examination reveals good range of motion.  No tenderness to palpation of the cervical spine or paraspinous muscles bilaterally. Not able to complete active ROM of right shoulder. Not able to complete passive ROM of right shoulder.     Imaging:   Cervical MRI 11/9/2022  IMPRESSION:  1. Diffuse degenerative change of the cervical spine as detailed above.  2. No spinal canal stenosis of the cervical spine.  3. Moderate neural foraminal stenosis on the right at C3-C4 and on the right at C4-C5.    Assessment/Plan:   Cervicalgia  Right shoulder pain  Right arm  pain  Right arm weakness  Right hand numbness    Discussed options, she has too much pain and weakness to be able to participate in PT  Will plan for C3-5 ACDF  Risks and benefits discussed      Again, thank you for allowing me to participate in the care of your patient.        Sincerely,        Rick Franco MD

## 2022-12-05 NOTE — PATIENT INSTRUCTIONS
Patient Instructions    Surgery scheduled at [facility] for ACDF (anterior cervical discectomy & fusion) with Dr. Franco     Pre-Operative    Surgical risks: blood clots in the leg or lung, problems urinating, nerve damage, drainage from the incision, infection, stiffness    You will need a Pre-operative physical with primary care physician within 30 days prior to your surgical date.     You will spend 1 night in the hospital.    Shower procedure  You will need to shower the night before and morning of surgery using the antimicrobial soap (chlorhexidine) given to you. Please refer to showering instruction sheet in your surgery education folder.    Eating/Drinking  Stop all solid foods 8 hours before surgery.  Keep drinking clear liquids until 4 hours before surgery  Clear liquids include water, clear juice, black coffee, or clear tea without milk, Gatorade, clear soda.     Medications  Discontinue Aspirin & NSAIDs (Advil/Ibuprofen, Indocin, Naproxen,Nuprin,Relafen/Nabumetone, Diclofenac,Meloxicam, Aleve, Celebrex) 7 days prior to surgical date. After surgery, do not begin taking these medications until given clearance as it may cause bleeding and interfere with healing.   It is ok to take Tylenol (Acetaminophen) for pain within the 7 days prior to surgery. Example: you could take 1000 mg 3 times per day. Do not exceed 3,000 mg per day.   If you are on chronic pain medication (oxycodone, Percocet, hydrocodone, Vicodin, Norco, Dilaudid, morphine, MS Contin, naltrexone, Suboxone, etc) or have a pain contract you need  to contact the pain medication prescriber and/or the prescriber who holds the pain contract with you. A plan needs to be created between you and the provider on how to take your chronic pain medication leading up to surgery and what you will be taking to control pain/who will be prescribing that pain medication during recovery.  Any other medications prescribed, please discuss  "with your primary care provider at your pre-operative physical     COVID Testing   As part of preparation for your upcoming procedure you are required to have a test for the novel Coronavirus, COVID-19  A PCR covid test needs to be completed within 4 days (96) hours of surgery. This can be scheduled at any [facility] lab by calling [phone].  Review \"Before Your Procedure or Hospital Admission\" printout in your surgery education folder for additional details    COVID Vaccine: You may NOT receive the COVID-19 vaccine 72 hours before or after surgery.  [facility]    Post-Operative    Incision Care  Look at your incision site every day. You  may need a mirror or family member to help you.   Watch for signs of infection  Redness, swelling, warmth, drainage (Green or yellow drainage (pus) from your incision or increased bloody drainage), and fever of 101 degrees or higher  [facility] [phone]  Remove dressing as instructed upon discharge  Do not apply lotions or ointments to incision  It is okay to shower, just pat the incision dry   No submerging incision in water such as pools, hot tubs, or baths for at least 8 weeks and until the incision is healed    Pain Management  Dealing with pain  As your body heals, you might feel a stabbing, burning, or aching pain. You may also have some numbness.  Everyone feels pain differently, we may ask you to rate your pain using a pain scale. This will let us know how much pain you feel.   Keep in mind that medicine won't take away all of your pain. It helps to try other ways to relax and ease pain.   Things to help with pain  After surgery, we will give you medicine for your pain. These medications work well, but they can make you drowsy, itchy, or sick to your stomach. If we give you narcotics for pain, try to take the pills with food.   For mild to moderate pain, you can take medication such as Tylenol. These can be used with narcotics, but make sure that your " narcotic does not contain Tylenol.   Do NOT drive while taking narcotic pain medication  Do NOT drink alcohol while using any pain medication  You can utilize ice as needed (no longer than 20 minutes at one time)  Refills of pain medication: please call the neurosurgery clinic to request 2-3 days before you run out  Aspirin & NSAIDS (ex. Ibuprofen, aleve, naproxen): Don't take NSAIDs until 6 weeks after surgery to reduce risk of bleeding and interference with bone healing     Bowel Care  Many people have constipation (hard stools) after surgery. The narcotic pain medication we often prescribed can contribute to constipation. To help prevent constipation: Drink plenty of fluid (8-10 glasses/day); Eat more fiber, such as whole grain bread, bran cereal, and fruits and vegetables; Stay active by walking; Over the counter stool softener may also help.      Activity Restrictions  For the first 6-8 weeks, no lifting > 10 pounds, no bending, twisting, or overhead reaching.   Take stairs in moderation   Walking is the best way to start exercise after surgery. Take short frequent walks. You may gradually increase the distance as tolerated. If you feel pain, decrease your activity, but DO NOT stop walking. Walking will help you gain strength and prevent muscle soreness and spasms.   Avoid bed rest and prolonged sitting for longer than 30 minutes (change positions frequently while awake)  No contact sports or high impact activities such as; running/jogging, snowmobile or 4 whitman riding or any other recreational vehicles until after given clearance at one of your follow up visits  If a brace is required per Dr. Franco, [facility] will fit you for the brace in the hospital. Brace type and length of time to wear it will be determined by Dr. Franco.     Contact clinic right away or go to the Emergency Department if you develop:   Infection (incisional redness, swelling, warmth, drainage, or fever (temp > 101 F))  New injury  Bladder  or bowel changes or loss of control    Signs of blood clot:  Swelling and/or warmth in one or both legs  Pain or tenderness in your leg, ankle, foot, or arm   Red or discolored skin     Go to the Emergency Department   If sudden onset of severe headache, weakness, confusion, change in level of consciousness, pain, or loss of movement.  Chest pain  Trouble breathing     Post-Op Follow Up Appointments  2 week incision check & staple/suture removal with Neurosurgery Nurse  6 week post op with x-ray prior with our Physician Assistant or Nurse Practitioner  3 months post op with x-ray prior with our Physician Assistant or Nurse Practitioner  6 months post op with x-ray prior with our Physician Assistant or Nurse Practitioner  1 year post op with x-ray prior with our Physician Assistant or Nurse Practitioner  Please call to schedule follow up and xray appointments at [phone]    Resources  If you are currently employed, you will need to be off work for 4-6 weeks for recovery and healing.  Please fax any FMLA/short term disability paperwork to [phone] prior to surgery  You may call our clinic when you'd like to return to work and we can provide a work letter  To allow staff adequate time to complete paperwork, please send as soon as possible     [facility]  [facility] - [address] [address]  Telephone:  [phone]       Fax:  [phone]

## 2022-12-05 NOTE — NURSING NOTE
Reviewed pre- and post-operative instructions as outlined in the After Visit Summary/Patient Instructions with patient.   Surgery folder was given to patient    Patient Education Topic: Procedure with Dr. Franco     Learner(s): Patient and Significant other/Spouse     Knowledge Level: Basic    Readiness to Learn: Ready    Method:  Verbal explanation    Outcome: Able to verbalize instructions    Barriers to Learning: No barrier    Covid Testing: patient educated they will need to have a test for the novel Coronavirus, COVID-19.The PCR test needs to be completed within 4 days (96) hours of surgery. . Order Placed.    Scheduling Number: [phone]    NDI/NORI: Confirmation of completion within last 6 months    Patient had the opportunity for questions to be answered. Advised Patient and Significant other/Spouse  to call clinic with any questions/concerns. Gave patient antibacterial soap for pre-surgery skin preparation.

## 2022-12-06 ENCOUNTER — ANCILLARY PROCEDURE (OUTPATIENT)
Dept: GENERAL RADIOLOGY | Facility: CLINIC | Age: 77
End: 2022-12-06
Attending: PHYSICIAN ASSISTANT
Payer: COMMERCIAL

## 2022-12-06 ENCOUNTER — OFFICE VISIT (OUTPATIENT)
Dept: URGENT CARE | Facility: URGENT CARE | Age: 77
End: 2022-12-06
Payer: COMMERCIAL

## 2022-12-06 VITALS
DIASTOLIC BLOOD PRESSURE: 74 MMHG | OXYGEN SATURATION: 98 % | WEIGHT: 203.7 LBS | SYSTOLIC BLOOD PRESSURE: 159 MMHG | BODY MASS INDEX: 33.64 KG/M2 | TEMPERATURE: 97.5 F | HEART RATE: 73 BPM

## 2022-12-06 DIAGNOSIS — S00.33XA CONTUSION OF NOSE, INITIAL ENCOUNTER: ICD-10-CM
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
## 2022-12-06 DIAGNOSIS — J31.0 PURULENT RHINITIS: ICD-10-CM

## 2022-12-06 DIAGNOSIS — Z87.820 H/O TRAUMATIC BRAIN INJURY: ICD-10-CM

## 2022-12-06 DIAGNOSIS — G40.909 SEIZURE DISORDER (H): ICD-10-CM

## 2022-12-06 DIAGNOSIS — J31.0 PURULENT RHINITIS: Primary | ICD-10-CM

## 2022-12-06 PROCEDURE — 70160 X-RAY EXAM OF NASAL BONES: CPT | Mod: TC | Performed by: RADIOLOGY

## 2022-12-06 PROCEDURE — 99214 OFFICE O/P EST MOD 30 MIN: CPT | Performed by: PHYSICIAN ASSISTANT

## 2022-12-06 NOTE — PROGRESS NOTES
Chief Complaint   Patient presents with     URI     Green discharge, pt fell on her nose 1 week ago, swelling and discoloration.      Xray- I see no obvious fracture of nose or orbits    Results for orders placed or performed in visit on 12/06/22   XR Nasal Bones 3 Views     Status: None (Preliminary result)    Narrative    NASAL BONES 3 VIEWS   12/6/2022 11:09 AM     HISTORY: Contusion of nose, initial encounter; Fall, initial  encounter; Purulent rhinitis.    COMPARISON: None.      Impression    IMPRESSION: No displaced nasal bone fracture is appreciated.               ASSESSMENT:     ICD-10-CM    1. Purulent rhinitis  J31.0 XR Nasal Bones 3 Views     amoxicillin-clavulanate (AUGMENTIN) 875-125 MG tablet      2. Contusion of nose, initial encounter  S00.33XA XR Nasal Bones 3 Views     amoxicillin-clavulanate (AUGMENTIN) 875-125 MG tablet      3. Fall, initial encounter  W19.XXXA XR Nasal Bones 3 Views     amoxicillin-clavulanate (AUGMENTIN) 875-125 MG tablet      4. H/O traumatic brain injury  Z87.820       5. Seizure disorder (H)  G40.909           PLAN:  I have discussed clinical findings with patient.  Side effects of medications discussed.  Symptomatic care is discussed.  I have discussed the possibility of  worsening symptoms and indication to RTC or go to the ER if they occur.  All questions are answered, patient indicates understanding of these issues and is in agreement with plan.   Patient care instructions are discussed/given at the end of visit.   Lots of rest and fluids.      Patricia Ross PA-C      SUBJECTIVE:  77-year-old rolled out of bed onto her nose 1 week ago.  Nose was black and blue and swelling.  Now with green thick nasal discharge.  No fever.  No loss of consciousness.  History of traumatic brain injury and seizure disorder.  If headache, vision changes, nausea, vomiting.    Allergies   Allergen Reactions     Codeine Sulfate      Seasonal Allergies        Past Medical History:    Diagnosis Date     Advanced directives, counseling/discussion 3/7/2012     Anxiety attack 2006    EEG normal, improved with lamictal     Chronic rhinitis 1/31/2011     Closed head injury 2004    subarachnoid hemorrage/cerebral contusion with sequellae of significant diminished short term memory, word finding difficulties and emotional lability, was in coma for 2 months, on seizure prophylaxsis with lamictal, f/b neuro annually     Generalized anxiety disorder      Major depression      Major depression in complete remission (H) 1/31/2011    was seen by psychiatrist in past, has tried Effexor and Seroquel and Hiram's Wart     Migraine headache     resolved s/p closed head injury     Panic attacks      Psoriasis 1/31/2011     Short-term memory loss 2004    secondary to closed head injury     Subarachnoid hemorrhage following injury with loss of consciousness and death due to other cause prior to regaining consciousness, initial encounter 6/1/2004       acetaminophen (TYLENOL) 325 MG tablet, Take 2 tablets (650 mg) by mouth every 4 hours as needed for other (mild pain)  citalopram (CELEXA) 20 MG tablet, Take 1 tablet (20 mg) by mouth daily **due for visit for further refills**  famotidine (PEPCID) 40 MG tablet, Take 1 tablet (40 mg) by mouth nightly as needed for heartburn  fluticasone (FLONASE) 50 MCG/ACT nasal spray, INSTILL 1 - 2 SPRAYS INTO BOTH NOSTRILS DAILY FOR ALLERGIES  lamoTRIgine (LAMICTAL) 200 MG tablet, Take 1 tablet (200 mg) by mouth daily Reported on 2/15/2017  omeprazole (PRILOSEC) 20 MG DR capsule, Take 2 capsules (40 mg) by mouth daily  Probiotic Product (PROBIOTIC-10 PO),   prochlorperazine (COMPAZINE) 5 MG tablet, Take 1 tablet (5 mg) by mouth every 6 hours as needed for nausea or vomiting  triamcinolone (KENALOG) 0.1 % external cream, apply topically twice a day as needed for a maximum of 14 days    No current facility-administered medications on file prior to visit.      Social History      Tobacco Use     Smoking status: Former     Packs/day: 1.00     Years: 25.00     Pack years: 25.00     Types: Cigarettes     Smokeless tobacco: Never     Tobacco comments:     Quit 15 years ago.   Substance Use Topics     Alcohol use: No       ROS:  CONSTITUTIONAL: Negative for fatigue or fever.  EYES: Negative for eye problems.  ENT: As above.  RESP: Negative for shortness of breath   CV: Negative for chest pains.  GI: Negative for vomiting.  MUSCULOSKELETAL:  Negative for significant muscle or joint pains.  NEUROLOGIC: Negative for headaches.  SKIN: Negative for rash.  PSYCH: Normal mentation for age.    OBJECTIVE:  BP (!) 159/74 (BP Location: Right arm, Patient Position: Sitting, Cuff Size: Adult Large)   Pulse 73   Temp 97.5  F (36.4  C) (Tympanic)   Wt 92.4 kg (203 lb 11.2 oz)   SpO2 98%   BMI 33.64 kg/m    GENERAL APPEARANCE: Healthy, alert and no distress.  EYES:Conjunctiva/sclera clear.  EOMs intact without pain.  NOSE/MOUTH: Nose is with mild ecchymosis.  Tender lower nasal bridge.    SINUSES: Mild left  maxillary sinus tenderness.  THROAT: No erythema w/o tonsillar enlargement . No exudates.  NECK: Supple, nontender, no lymphadenopathy.  RESP: Lungs clear to auscultation - no rales, rhonchi or wheezes  NEURO: Awake, alert    SKIN: No rashes        Patricia Ross PA-C

## 2022-12-15 DIAGNOSIS — F33.41 RECURRENT MAJOR DEPRESSIVE DISORDER, IN PARTIAL REMISSION (H): ICD-10-CM

## 2022-12-15 DIAGNOSIS — F41.1 GENERALIZED ANXIETY DISORDER: ICD-10-CM

## 2022-12-15 RX ORDER — CITALOPRAM HYDROBROMIDE 20 MG/1
20 TABLET ORAL DAILY
Qty: 90 TABLET | Refills: 0 | Status: SHIPPED | OUTPATIENT
Start: 2022-12-15 | End: 2023-01-27

## 2022-12-18 ENCOUNTER — HEALTH MAINTENANCE LETTER (OUTPATIENT)
Age: 77
End: 2022-12-18

## 2022-12-28 ENCOUNTER — OFFICE VISIT (OUTPATIENT)
Dept: FAMILY MEDICINE | Facility: CLINIC | Age: 77
End: 2022-12-28
Payer: COMMERCIAL

## 2022-12-28 VITALS
HEART RATE: 82 BPM | SYSTOLIC BLOOD PRESSURE: 169 MMHG | DIASTOLIC BLOOD PRESSURE: 91 MMHG | TEMPERATURE: 98.3 F | HEIGHT: 65 IN | RESPIRATION RATE: 17 BRPM | BODY MASS INDEX: 34.39 KG/M2 | WEIGHT: 206.4 LBS | OXYGEN SATURATION: 95 %

## 2022-12-28 DIAGNOSIS — Z01.818 PREOP GENERAL PHYSICAL EXAM: Primary | ICD-10-CM

## 2022-12-28 DIAGNOSIS — Z12.11 SCREEN FOR COLON CANCER: ICD-10-CM

## 2022-12-28 DIAGNOSIS — F41.1 GENERALIZED ANXIETY DISORDER: ICD-10-CM

## 2022-12-28 DIAGNOSIS — Z98.890 HISTORY OF TRACHEOSTOMY: ICD-10-CM

## 2022-12-28 DIAGNOSIS — S06.9X9S TRAUMATIC BRAIN INJURY WITH LOSS OF CONSCIOUSNESS, SEQUELA (H): ICD-10-CM

## 2022-12-28 DIAGNOSIS — G40.909 SEIZURE DISORDER (H): ICD-10-CM

## 2022-12-28 DIAGNOSIS — R03.0 ELEVATED BLOOD PRESSURE READING WITHOUT DIAGNOSIS OF HYPERTENSION: ICD-10-CM

## 2022-12-28 DIAGNOSIS — F33.41 RECURRENT MAJOR DEPRESSIVE DISORDER, IN PARTIAL REMISSION (H): ICD-10-CM

## 2022-12-28 DIAGNOSIS — M54.12 CERVICAL RADICULOPATHY: ICD-10-CM

## 2022-12-28 DIAGNOSIS — D64.9 ANEMIA, UNSPECIFIED TYPE: ICD-10-CM

## 2022-12-28 DIAGNOSIS — K21.9 GASTROESOPHAGEAL REFLUX DISEASE WITHOUT ESOPHAGITIS: ICD-10-CM

## 2022-12-28 LAB
ALBUMIN SERPL-MCNC: 3.6 G/DL (ref 3.4–5)
ALP SERPL-CCNC: 96 U/L (ref 40–150)
ALT SERPL W P-5'-P-CCNC: 20 U/L (ref 0–50)
ANION GAP SERPL CALCULATED.3IONS-SCNC: 5 MMOL/L (ref 3–14)
AST SERPL W P-5'-P-CCNC: 14 U/L (ref 0–45)
BASOPHILS # BLD AUTO: 0.1 10E3/UL (ref 0–0.2)
BASOPHILS NFR BLD AUTO: 2 %
BILIRUB SERPL-MCNC: 0.2 MG/DL (ref 0.2–1.3)
BUN SERPL-MCNC: 19 MG/DL (ref 7–30)
CALCIUM SERPL-MCNC: 9.9 MG/DL (ref 8.5–10.1)
CHLORIDE BLD-SCNC: 108 MMOL/L (ref 94–109)
CO2 SERPL-SCNC: 26 MMOL/L (ref 20–32)
CREAT SERPL-MCNC: 0.82 MG/DL (ref 0.52–1.04)
EOSINOPHIL # BLD AUTO: 0.2 10E3/UL (ref 0–0.7)
EOSINOPHIL NFR BLD AUTO: 3 %
ERYTHROCYTE [DISTWIDTH] IN BLOOD BY AUTOMATED COUNT: 15.4 % (ref 10–15)
GFR SERPL CREATININE-BSD FRML MDRD: 74 ML/MIN/1.73M2
GLUCOSE BLD-MCNC: 98 MG/DL (ref 70–99)
HCT VFR BLD AUTO: 37.1 % (ref 35–47)
HGB BLD-MCNC: 11.4 G/DL (ref 11.7–15.7)
IMM GRANULOCYTES # BLD: 0 10E3/UL
IMM GRANULOCYTES NFR BLD: 0 %
LYMPHOCYTES # BLD AUTO: 1.6 10E3/UL (ref 0.8–5.3)
LYMPHOCYTES NFR BLD AUTO: 27 %
MCH RBC QN AUTO: 25.6 PG (ref 26.5–33)
MCHC RBC AUTO-ENTMCNC: 30.7 G/DL (ref 31.5–36.5)
MCV RBC AUTO: 83 FL (ref 78–100)
MONOCYTES # BLD AUTO: 0.7 10E3/UL (ref 0–1.3)
MONOCYTES NFR BLD AUTO: 12 %
NEUTROPHILS # BLD AUTO: 3.4 10E3/UL (ref 1.6–8.3)
NEUTROPHILS NFR BLD AUTO: 57 %
PLATELET # BLD AUTO: 258 10E3/UL (ref 150–450)
POTASSIUM BLD-SCNC: 4.4 MMOL/L (ref 3.4–5.3)
PROT SERPL-MCNC: 7.2 G/DL (ref 6.8–8.8)
RBC # BLD AUTO: 4.45 10E6/UL (ref 3.8–5.2)
SODIUM SERPL-SCNC: 139 MMOL/L (ref 133–144)
WBC # BLD AUTO: 5.9 10E3/UL (ref 4–11)

## 2022-12-28 PROCEDURE — 36415 COLL VENOUS BLD VENIPUNCTURE: CPT | Performed by: FAMILY MEDICINE

## 2022-12-28 PROCEDURE — 80053 COMPREHEN METABOLIC PANEL: CPT | Performed by: FAMILY MEDICINE

## 2022-12-28 PROCEDURE — 84443 ASSAY THYROID STIM HORMONE: CPT | Performed by: FAMILY MEDICINE

## 2022-12-28 PROCEDURE — 82728 ASSAY OF FERRITIN: CPT | Performed by: FAMILY MEDICINE

## 2022-12-28 PROCEDURE — 93000 ELECTROCARDIOGRAM COMPLETE: CPT | Performed by: FAMILY MEDICINE

## 2022-12-28 PROCEDURE — 83540 ASSAY OF IRON: CPT | Performed by: FAMILY MEDICINE

## 2022-12-28 PROCEDURE — 85025 COMPLETE CBC W/AUTO DIFF WBC: CPT | Performed by: FAMILY MEDICINE

## 2022-12-28 PROCEDURE — 83550 IRON BINDING TEST: CPT | Performed by: FAMILY MEDICINE

## 2022-12-28 PROCEDURE — 99214 OFFICE O/P EST MOD 30 MIN: CPT | Performed by: FAMILY MEDICINE

## 2022-12-28 RX ORDER — AMLODIPINE BESYLATE 2.5 MG/1
2.5 TABLET ORAL DAILY
Qty: 30 TABLET | Refills: 0 | Status: SHIPPED | OUTPATIENT
Start: 2022-12-28 | End: 2023-01-27

## 2022-12-28 ASSESSMENT — ENCOUNTER SYMPTOMS
SEIZURES: 0
DYSURIA: 0
FEVER: 0
ABDOMINAL PAIN: 0
UNEXPECTED WEIGHT CHANGE: 0
SHORTNESS OF BREATH: 0
DIARRHEA: 0
EYE PAIN: 0
SORE THROAT: 0
BRUISES/BLEEDS EASILY: 0
ADENOPATHY: 0
EYE REDNESS: 0
HEADACHES: 0
VOMITING: 0
POLYDIPSIA: 0
WOUND: 0
DIZZINESS: 0
WHEEZING: 0
CONSTIPATION: 0
CHILLS: 0
RHINORRHEA: 0
HEARTBURN: 1
TROUBLE SWALLOWING: 0
PALPITATIONS: 0
DIFFICULTY URINATING: 0
CHEST TIGHTNESS: 0
COUGH: 0

## 2022-12-28 ASSESSMENT — PATIENT HEALTH QUESTIONNAIRE - PHQ9
SUM OF ALL RESPONSES TO PHQ QUESTIONS 1-9: 5
SUM OF ALL RESPONSES TO PHQ QUESTIONS 1-9: 5
10. IF YOU CHECKED OFF ANY PROBLEMS, HOW DIFFICULT HAVE THESE PROBLEMS MADE IT FOR YOU TO DO YOUR WORK, TAKE CARE OF THINGS AT HOME, OR GET ALONG WITH OTHER PEOPLE: SOMEWHAT DIFFICULT

## 2022-12-28 ASSESSMENT — PAIN SCALES - GENERAL: PAINLEVEL: NO PAIN (0)

## 2022-12-28 NOTE — PATIENT INSTRUCTIONS
Do not take any NSAID pain relievers such as ibuprofen (advil, motrin) or naproxen (aleve) or aspirin the week prior to surgery.   Acetaminophen (tylenol) is okay.      Ok to continue prescription medications. Ok to take prescription medications on the morning of surgery with small sip of water.     Hold all supplements for the week prior to surgery.     Complete health directive. Bring us a copy to add to your chart.     Monitor blood pressure (goal is < 140/90)- check daily and keep log of your numbers. Schedule with Dr. Ramirez for one month after surgery to review results and start treatment if your blood pressure remains high.   - check blood pressure at rest for 5-10 minute  - avoid checking blood pressure within 30 minutes of caffeine or exercise.           For informational purposes only. Not to replace the advice of your health care provider. Copyright   2003, 2019 [facility]. All rights reserved. Clinically reviewed by Lucy Novak MD. NEURONIX [id number] - REV 12/22.  Preparing for Your Surgery  Getting started  A nurse will call you to review your health history and instructions. They will give you an arrival time based on your scheduled surgery time. Please be ready to share:  Your doctor's clinic name and phone number  Your medical, surgical, and anesthesia history  A list of allergies and sensitivities  A list of medicines, including herbal treatments and over-the-counter drugs  Whether the patient has a legal guardian (ask how to send us the papers in advance)  Please tell us if you're pregnant--or if there's any chance you might be pregnant. Some surgeries may injure a fetus (unborn baby), so they require a pregnancy test. Surgeries that are safe for a fetus don't always need a test, and you can choose whether to have one.   If you have a child who's having surgery, please ask for a copy of Preparing for Your Child's Surgery.    Preparing for surgery  Within 10 to 30 days of surgery: Have  a pre-op exam (sometimes called an H&P, or History and Physical). This can be done at a clinic or pre-operative center.  If you're having a , you may not need this exam. Talk to your care team.  At your pre-op exam, talk to your care team about all medicines you take. If you need to stop any medicines before surgery, ask when to start taking them again.  We do this for your safety. Many medicines can make you bleed too much during surgery. Some change how well surgery (anesthesia) drugs work.  Call your insurance company to let them know you're having surgery. (If you don't have insurance, call [phone].)  Call your clinic if there's any change in your health. This includes signs of a cold or flu (sore throat, runny nose, cough, rash, fever). It also includes a scrape or scratch near the surgery site.  If you have questions on the day of surgery, call your hospital or surgery center.  Eating and drinking guidelines  For your safety: Unless your surgeon tells you otherwise, follow the guidelines below.  Eat and drink as usual until 8 hours before you arrive for surgery. After that, no food or milk.  Drink clear liquids until 2 hours before you arrive. These are liquids you can see through, like water, Gatorade, and Propel Water. They also include plain black coffee and tea (no cream or milk), candy, and breath mints. You can spit out gum when you arrive.  If you drink alcohol: Stop drinking it the night before surgery.  If your care team tells you to take medicine on the morning of surgery, it's okay to take it with a sip of water.  Preventing infection  Shower or bathe the night before and morning of your surgery. Follow the instructions your clinic gave you. (If no instructions, use regular soap.)  Don't shave or clip hair near your surgery site. We'll remove the hair if needed.  Don't smoke or vape the morning of surgery. You may chew nicotine gum up to 2 hours before surgery. A nicotine patch is  okay.  Note: Some surgeries require you to completely quit smoking and nicotine. Check with your surgeon.  Your care team will make every effort to keep you safe from infection. We will:  Clean our hands often with soap and water (or an alcohol-based hand rub).  Clean the skin at your surgery site with a special soap that kills germs.  Give you a special gown to keep you warm. (Cold raises the risk of infection.)  Wear special hair covers, masks, gowns and gloves during surgery.  Give antibiotic medicine, if prescribed. Not all surgeries need antibiotics.  What to bring on the day of surgery  Photo ID and insurance card  Copy of your health care directive, if you have one  Glasses and hearing aids (bring cases)  You can't wear contacts during surgery  Inhaler and eye drops, if you use them (tell us about these when you arrive)  CPAP machine or breathing device, if you use them  A few personal items, if spending the night  If you have . . .  A pacemaker, ICD (cardiac defibrillator) or other implant: Bring the ID card.  An implanted stimulator: Bring the remote control.  A legal guardian: Bring a copy of the certified (court-stamped) guardianship papers.  Please remove any jewelry, including body piercings. Leave jewelry and other valuables at home.  If you're going home the day of surgery  You must have a responsible adult drive you home. They should stay with you overnight as well.  If you don't have someone to stay with you, and you aren't safe to go home alone, we may keep you overnight. Insurance often won't pay for this.  After surgery  If it's hard to control your pain or you need more pain medicine, please call your surgeon's office.  Questions?   If you have any questions for your care team, list them here: _________________________________________________________________________________________________________________________________________________________________________  ____________________________________ ____________________________________ ____________________________________
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
## 2022-12-28 NOTE — LETTER
January 5, 2023      Negin GUS Sumi  [address]  [place] [address]      Negin,   Just an update on the additional labs. Your testing does confirm you have a mild iron deficiency anemia. It's unclear why you have this. Sometimes, you can develop iron deficiency from poor iron/nutritional intake. Sometimes it can occur after bleeding or surgery. We usually want to investigate the GI system to make sure you are not having any slow bleeding that you might now be aware of. This is typically done with a colonoscopy and upper endoscopy.   So, for now I would suggest starting an oral iron supplement and getting scheduled for the endoscopies (these can be completed after you have healed from surgery in 4-6 weeks. A  will contact you to arrange the appointment for them.   For the iron supplement, I would recommend starting on iron sulfate 325 mg once daily. This is available over the counter. Take the iron with a vitamin C tablet or orange juice to help adsorption. You may need a stool softener and to increase fiber/water in your diet as iron can cause constipation/stomach upset. You can take the iron tablet every other day if this ocurrs. Iron may also turn the stool black.  Certainly, all of this can be reviewed at your next visit with Dr. Ramirez in more detail. He can also discuss recheck of the hemoglobin and iron levels at that time. .    Please MyChart or call if you have any concerns or questions.   Sincerely,  Jessica Beach MD  Resulted Orders   CBC with platelets and differential   Result Value Ref Range    WBC Count 5.9 4.0 - 11.0 10e3/uL    RBC Count 4.45 3.80 - 5.20 10e6/uL    Hemoglobin 11.4 (L) 11.7 - 15.7 g/dL    Hematocrit 37.1 35.0 - 47.0 %    MCV 83 78 - 100 fL    MCH 25.6 (L) 26.5 - 33.0 pg    MCHC 30.7 (L) 31.5 - 36.5 g/dL    RDW 15.4 (H) 10.0 - 15.0 %    Platelet Count 258 150 - 450 10e3/uL    % Neutrophils 57 %    % Lymphocytes 27 %    % Monocytes 12 %    %  Eosinophils 3 %    % Basophils 2 %    % Immature Granulocytes 0 %    Absolute Neutrophils 3.4 1.6 - 8.3 10e3/uL    Absolute Lymphocytes 1.6 0.8 - 5.3 10e3/uL    Absolute Monocytes 0.7 0.0 - 1.3 10e3/uL    Absolute Eosinophils 0.2 0.0 - 0.7 10e3/uL    Absolute Basophils 0.1 0.0 - 0.2 10e3/uL    Absolute Immature Granulocytes 0.0 <=0.4 10e3/uL   Ferritin   Result Value Ref Range    Ferritin 9 8 - 252 ng/mL   Iron and iron binding capacity   Result Value Ref Range    Iron 37 35 - 180 ug/dL    Iron Binding Capacity 431 (H) 240 - 430 ug/dL    Iron Sat Index 9 (L) 15 - 46 %   TSH with free T4 reflex   Result Value Ref Range    TSH 2.76 0.40 - 4.00 mU/L
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
## 2022-12-29 DIAGNOSIS — D50.9 IRON DEFICIENCY ANEMIA, UNSPECIFIED IRON DEFICIENCY ANEMIA TYPE: Primary | ICD-10-CM

## 2022-12-29 LAB
FERRITIN SERPL-MCNC: 9 NG/ML (ref 8–252)
IRON SATN MFR SERPL: 9 % (ref 15–46)
IRON SERPL-MCNC: 37 UG/DL (ref 35–180)
TIBC SERPL-MCNC: 431 UG/DL (ref 240–430)
TSH SERPL DL<=0.005 MIU/L-ACNC: 2.76 MU/L (ref 0.4–4)
VIT B12 SERPL-MCNC: 664 PG/ML (ref 232–1245)

## 2022-12-29 PROCEDURE — 36415 COLL VENOUS BLD VENIPUNCTURE: CPT | Performed by: FAMILY MEDICINE

## 2022-12-29 PROCEDURE — 82607 VITAMIN B-12: CPT | Performed by: FAMILY MEDICINE

## 2022-12-29 NOTE — RESULT ENCOUNTER NOTE
JANELL to . new anemia found at her pre-op visit. Starting iron and getting endoscopies but she will needs the anemia monitored. Consider also thyroid test at next visit.      Negin,  Just an update on the additional labs. Your testing does confirm you have a mild iron deficiency anemia. It's unclear why you have this. Sometimes, you can develop iron deficiency from poor iron/nutritional intake. Sometimes it can occur after bleeding or surgery. We usually want to investigate the GI system to make sure you are not having any slow bleeding that you might now be aware of. This is typically done with a colonoscopy and upper endoscopy.   So, for now I would suggest starting an oral iron supplement and getting scheduled for the endoscopies (these can be completed after you have healed from surgery in 4-6 weeks. A  will contact you to arrange the appointment for them.   For the iron supplement, I would recommend starting on iron sulfate 325 mg once daily. This is available over the counter. Take the iron with a vitamin C tablet or orange juice to help adsorption. You may need a stool softener and to increase fiber/water in your diet as iron can cause constipation/stomach upset. You can take the iron tablet every other day if this ocurrs. Iron may also turn the stool black.  Certainly, all of this can be reviewed at your next visit with Dr. Ramirez in more detail. He can also discuss recheck of the hemoglobin and iron levels at that time. .    Please MyChart or call if you have any concerns or questions.   Sincerely,  Jessica Beach MD

## 2022-12-29 NOTE — RESULT ENCOUNTER NOTE
Negin,  It was a pleasure to see you in the office recently.   - the kidney, liver and electrolyte panel was normal.   -Your blood count panel does show your hemoglobin is slightly low (mild anemia).  However, it is much improved from where things were 1 year ago.  It is unclear what is causing this anemia as many factors can play a role.  Certainly vitamin deficiencies such as iron, vitamin B12 or folate acid can contribute.  But, sometimes anemia can occur just from chronic health conditions.  Typically, when anemia is found we recommend further blood work to evaluate for the cause.  I been able to add a few of these labs onto your blood draw that you had today and we will keep you posted with those results.  Ultimately, you will need to review this finding in more detail with Dr. Ramirez at your upcoming visit.   Overall though, your labs look just fine for surgery.  Please MyChart or call if you have any concerns or questions.   Sincerely,  Jessica Beach MD

## 2023-01-05 ENCOUNTER — ANESTHESIA EVENT (OUTPATIENT)
Dept: SURGERY | Facility: CLINIC | Age: 78
End: 2023-01-05
Payer: COMMERCIAL

## 2023-01-05 RX ORDER — ACETAMINOPHEN 500 MG
500-1000 TABLET ORAL EVERY 6 HOURS PRN
COMMUNITY

## 2023-01-05 RX ORDER — FERROUS SULFATE 325(65) MG
325 TABLET ORAL
COMMUNITY
End: 2023-03-22

## 2023-01-05 NOTE — PROGRESS NOTES
PTA medications updated by Medication Scribe prior to surgery via phone call with patient (last doses completed by Nurse)     Medication history sources: Patient, Patient's family/friend (Spouse, Dereje), Surescripts and H&P  In the past week, patient estimated taking medication this percent of the time: Greater than 90%  Adherence assessment: N/A Not Observed    Significant changes made to the medication list:  Patient reports no longer taking the following meds (med scribe removed from PTA med list): pepcid, Triamcinolone cream      Additional medication history information:   Patient was advised to bring: Flonase nasal spray    Medication reconciliation completed by provider prior to medication history? No    Time spent in this activity: 40 minutes    The information provided in this note is only as accurate as the sources available at the time of update(s)      Prior to Admission medications    Medication Sig Last Dose Taking? Auth Provider Long Term End Date   acetaminophen (TYLENOL) 500 MG tablet Take 500-1,000 mg by mouth every 6 hours as needed for mild pain PRN Yes Reported, Patient     amLODIPine (NORVASC) 2.5 MG tablet Take 1 tablet (2.5 mg) by mouth daily  at AM Yes Jessica Beach MD Yes    citalopram (CELEXA) 20 MG tablet Take 1 tablet (20 mg) by mouth daily **due for visit for further refills**  at AM Yes Shanel Ramirez MD Yes    ferrous sulfate (FEROSUL) 325 (65 Fe) MG tablet Take 325 mg by mouth daily (with breakfast) Has not started Yes Reported, Patient     fluticasone (FLONASE) 50 MCG/ACT nasal spray INSTILL 1 - 2 SPRAYS INTO BOTH NOSTRILS DAILY FOR ALLERGIES Unknown at PRN Yes Marlyn Arboelda MD     lamoTRIgine (LAMICTAL) 200 MG tablet Take 1 tablet (200 mg) by mouth daily Reported on 2/15/2017  at AM Yes Shanel Ramirez MD Yes    omeprazole (PRILOSEC) 20 MG DR capsule Take 2 capsules (40 mg) by mouth daily  Patient taking differently: Take 40 mg by mouth daily  (2 x 20 mg = 40 mg)  at AM Yes Shanel Ramirez MD     Probiotic Product (PROBIOTIC-10 PO) Take 1 capsule by mouth every morning 12/28/2022 at AM Yes Reported, Patient

## 2023-01-06 ENCOUNTER — APPOINTMENT (OUTPATIENT)
Dept: GENERAL RADIOLOGY | Facility: CLINIC | Age: 78
End: 2023-01-06
Attending: NEUROLOGICAL SURGERY
Payer: COMMERCIAL

## 2023-01-06 ENCOUNTER — ANESTHESIA (OUTPATIENT)
Dept: SURGERY | Facility: CLINIC | Age: 78
End: 2023-01-06
Payer: COMMERCIAL

## 2023-01-06 ENCOUNTER — HOSPITAL ENCOUNTER (OUTPATIENT)
Facility: CLINIC | Age: 78
Discharge: HOME | End: 2023-01-07
Attending: NEUROLOGICAL SURGERY | Admitting: NEUROLOGICAL SURGERY
Payer: COMMERCIAL

## 2023-01-06 DIAGNOSIS — M54.12 CERVICAL RADICULOPATHY: Primary | ICD-10-CM

## 2023-01-06 LAB — GLUCOSE BLDC GLUCOMTR-MCNC: 129 MG/DL (ref 70–99)

## 2023-01-06 PROCEDURE — 250N000011 HC RX IP 250 OP 636: Performed by: NURSE PRACTITIONER

## 2023-01-06 PROCEDURE — 258N000003 HC RX IP 258 OP 636

## 2023-01-06 PROCEDURE — 20930 SP BONE ALGRFT MORSEL ADD-ON: CPT | Performed by: NEUROLOGICAL SURGERY

## 2023-01-06 PROCEDURE — 258N000003 HC RX IP 258 OP 636: Performed by: NURSE PRACTITIONER

## 2023-01-06 PROCEDURE — 250N000025 HC SEVOFLURANE, PER MIN: Performed by: NEUROLOGICAL SURGERY

## 2023-01-06 PROCEDURE — 22845 INSERT SPINE FIXATION DEVICE: CPT | Mod: AS | Performed by: NURSE PRACTITIONER

## 2023-01-06 PROCEDURE — 272N000001 HC OR GENERAL SUPPLY STERILE: Performed by: NEUROLOGICAL SURGERY

## 2023-01-06 PROCEDURE — 710N000010 HC RECOVERY PHASE 1, LEVEL 2, PER MIN: Performed by: NEUROLOGICAL SURGERY

## 2023-01-06 PROCEDURE — 999N000179 XR SURGERY CARM FLUORO LESS THAN 5 MIN W STILLS

## 2023-01-06 PROCEDURE — 370N000017 HC ANESTHESIA TECHNICAL FEE, PER MIN: Performed by: NEUROLOGICAL SURGERY

## 2023-01-06 PROCEDURE — 22551 ARTHRD ANT NTRBDY CERVICAL: CPT | Performed by: NEUROLOGICAL SURGERY

## 2023-01-06 PROCEDURE — 22845 INSERT SPINE FIXATION DEVICE: CPT | Mod: 59 | Performed by: NEUROLOGICAL SURGERY

## 2023-01-06 PROCEDURE — 22853 INSJ BIOMECHANICAL DEVICE: CPT | Mod: AS | Performed by: NURSE PRACTITIONER

## 2023-01-06 PROCEDURE — 22551 ARTHRD ANT NTRBDY CERVICAL: CPT | Mod: AS | Performed by: NURSE PRACTITIONER

## 2023-01-06 PROCEDURE — C1762 CONN TISS, HUMAN(INC FASCIA): HCPCS | Performed by: NEUROLOGICAL SURGERY

## 2023-01-06 PROCEDURE — C1713 ANCHOR/SCREW BN/BN,TIS/BN: HCPCS | Performed by: NEUROLOGICAL SURGERY

## 2023-01-06 PROCEDURE — 82962 GLUCOSE BLOOD TEST: CPT

## 2023-01-06 PROCEDURE — 258N000003 HC RX IP 258 OP 636: Performed by: ANESTHESIOLOGY

## 2023-01-06 PROCEDURE — 360N000084 HC SURGERY LEVEL 4 W/ FLUORO, PER MIN: Performed by: NEUROLOGICAL SURGERY

## 2023-01-06 PROCEDURE — 22552 ARTHRD ANT NTRBD CERVICAL EA: CPT | Performed by: NEUROLOGICAL SURGERY

## 2023-01-06 PROCEDURE — 250N000009 HC RX 250: Performed by: NEUROLOGICAL SURGERY

## 2023-01-06 PROCEDURE — 22853 INSJ BIOMECHANICAL DEVICE: CPT | Performed by: NEUROLOGICAL SURGERY

## 2023-01-06 PROCEDURE — 250N000011 HC RX IP 250 OP 636: Performed by: ANESTHESIOLOGY

## 2023-01-06 PROCEDURE — 250N000011 HC RX IP 250 OP 636

## 2023-01-06 PROCEDURE — 278N000051 HC OR IMPLANT GENERAL: Performed by: NEUROLOGICAL SURGERY

## 2023-01-06 PROCEDURE — 250N000013 HC RX MED GY IP 250 OP 250 PS 637: Performed by: NURSE PRACTITIONER

## 2023-01-06 PROCEDURE — 250N000009 HC RX 250

## 2023-01-06 PROCEDURE — 250N000011 HC RX IP 250 OP 636: Performed by: NEUROLOGICAL SURGERY

## 2023-01-06 PROCEDURE — 999N000141 HC STATISTIC PRE-PROCEDURE NURSING ASSESSMENT: Performed by: NEUROLOGICAL SURGERY

## 2023-01-06 PROCEDURE — 22552 ARTHRD ANT NTRBD CERVICAL EA: CPT | Mod: AS | Performed by: NURSE PRACTITIONER

## 2023-01-06 DEVICE — IMP SCR MEDT ZEVO 3.5X13MM ST VA 7723513: Type: IMPLANTABLE DEVICE | Site: SPINE CERVICAL | Status: FUNCTIONAL

## 2023-01-06 DEVICE — CAGE 5030641 ANATOMIC PTC 14X11X6MM
Type: IMPLANTABLE DEVICE | Site: SPINE CERVICAL | Status: FUNCTIONAL
Brand: ANATOMIC PEEK PTC CERVICAL FUSION SYSTEM

## 2023-01-06 DEVICE — GRAFT BONE ACCELERATE GRAFTON 2 CANNULA SET 3CC T50203: Type: IMPLANTABLE DEVICE | Site: SPINE CERVICAL | Status: FUNCTIONAL

## 2023-01-06 DEVICE — IMP PLATE CERV MEDT ZEVO 37MM 2 LVL 3002037: Type: IMPLANTABLE DEVICE | Site: SPINE CERVICAL | Status: FUNCTIONAL

## 2023-01-06 RX ORDER — PANTOPRAZOLE SODIUM 40 MG/1
40 TABLET, DELAYED RELEASE ORAL DAILY
Status: DISCONTINUED | OUTPATIENT
Start: 2023-01-07 | End: 2023-01-07 | Stop reason: HOSPADM

## 2023-01-06 RX ORDER — BUPIVACAINE HYDROCHLORIDE AND EPINEPHRINE 5; 5 MG/ML; UG/ML
INJECTION, SOLUTION PERINEURAL PRN
Status: DISCONTINUED | OUTPATIENT
Start: 2023-01-06 | End: 2023-01-06 | Stop reason: HOSPADM

## 2023-01-06 RX ORDER — FENTANYL CITRATE 0.05 MG/ML
50 INJECTION, SOLUTION INTRAMUSCULAR; INTRAVENOUS EVERY 5 MIN PRN
Status: DISCONTINUED | OUTPATIENT
Start: 2023-01-06 | End: 2023-01-06 | Stop reason: HOSPADM

## 2023-01-06 RX ORDER — AMOXICILLIN 250 MG
1 CAPSULE ORAL 2 TIMES DAILY
Status: DISCONTINUED | OUTPATIENT
Start: 2023-01-06 | End: 2023-01-07 | Stop reason: HOSPADM

## 2023-01-06 RX ORDER — NALOXONE HYDROCHLORIDE 0.4 MG/ML
0.2 INJECTION, SOLUTION INTRAMUSCULAR; INTRAVENOUS; SUBCUTANEOUS
Status: DISCONTINUED | OUTPATIENT
Start: 2023-01-06 | End: 2023-01-07 | Stop reason: HOSPADM

## 2023-01-06 RX ORDER — HYDROMORPHONE HYDROCHLORIDE 1 MG/ML
INJECTION, SOLUTION INTRAMUSCULAR; INTRAVENOUS; SUBCUTANEOUS PRN
Status: DISCONTINUED | OUTPATIENT
Start: 2023-01-06 | End: 2023-01-06

## 2023-01-06 RX ORDER — FERROUS SULFATE 325(65) MG
325 TABLET ORAL
Status: DISCONTINUED | OUTPATIENT
Start: 2023-01-07 | End: 2023-01-07 | Stop reason: HOSPADM

## 2023-01-06 RX ORDER — BISACODYL 10 MG
10 SUPPOSITORY, RECTAL RECTAL DAILY PRN
Status: DISCONTINUED | OUTPATIENT
Start: 2023-01-06 | End: 2023-01-07 | Stop reason: HOSPADM

## 2023-01-06 RX ORDER — FENTANYL CITRATE 50 UG/ML
INJECTION, SOLUTION INTRAMUSCULAR; INTRAVENOUS PRN
Status: DISCONTINUED | OUTPATIENT
Start: 2023-01-06 | End: 2023-01-06

## 2023-01-06 RX ORDER — SODIUM CHLORIDE, SODIUM LACTATE, POTASSIUM CHLORIDE, CALCIUM CHLORIDE 600; 310; 30; 20 MG/100ML; MG/100ML; MG/100ML; MG/100ML
INJECTION, SOLUTION INTRAVENOUS CONTINUOUS
Status: DISCONTINUED | OUTPATIENT
Start: 2023-01-06 | End: 2023-01-06 | Stop reason: HOSPADM

## 2023-01-06 RX ORDER — SODIUM CHLORIDE 9 MG/ML
INJECTION, SOLUTION INTRAVENOUS CONTINUOUS
Status: DISCONTINUED | OUTPATIENT
Start: 2023-01-06 | End: 2023-01-07 | Stop reason: HOSPADM

## 2023-01-06 RX ORDER — LAMOTRIGINE 100 MG/1
200 TABLET ORAL DAILY
Status: DISCONTINUED | OUTPATIENT
Start: 2023-01-07 | End: 2023-01-07 | Stop reason: HOSPADM

## 2023-01-06 RX ORDER — NALOXONE HYDROCHLORIDE 0.4 MG/ML
0.4 INJECTION, SOLUTION INTRAMUSCULAR; INTRAVENOUS; SUBCUTANEOUS
Status: DISCONTINUED | OUTPATIENT
Start: 2023-01-06 | End: 2023-01-07 | Stop reason: HOSPADM

## 2023-01-06 RX ORDER — CITALOPRAM HYDROBROMIDE 20 MG/1
20 TABLET ORAL DAILY
Status: DISCONTINUED | OUTPATIENT
Start: 2023-01-07 | End: 2023-01-07 | Stop reason: HOSPADM

## 2023-01-06 RX ORDER — HYDROMORPHONE HCL IN WATER/PF 6 MG/30 ML
0.2 PATIENT CONTROLLED ANALGESIA SYRINGE INTRAVENOUS EVERY 5 MIN PRN
Status: DISCONTINUED | OUTPATIENT
Start: 2023-01-06 | End: 2023-01-06 | Stop reason: HOSPADM

## 2023-01-06 RX ORDER — HYDROMORPHONE HCL IN WATER/PF 6 MG/30 ML
0.2 PATIENT CONTROLLED ANALGESIA SYRINGE INTRAVENOUS
Status: DISCONTINUED | OUTPATIENT
Start: 2023-01-06 | End: 2023-01-07 | Stop reason: HOSPADM

## 2023-01-06 RX ORDER — HYDROXYZINE HYDROCHLORIDE 10 MG/1
10 TABLET, FILM COATED ORAL EVERY 6 HOURS PRN
Status: DISCONTINUED | OUTPATIENT
Start: 2023-01-06 | End: 2023-01-07 | Stop reason: HOSPADM

## 2023-01-06 RX ORDER — DEXAMETHASONE SODIUM PHOSPHATE 4 MG/ML
INJECTION, SOLUTION INTRA-ARTICULAR; INTRALESIONAL; INTRAMUSCULAR; INTRAVENOUS; SOFT TISSUE PRN
Status: DISCONTINUED | OUTPATIENT
Start: 2023-01-06 | End: 2023-01-06

## 2023-01-06 RX ORDER — HYDROMORPHONE HCL IN WATER/PF 6 MG/30 ML
0.4 PATIENT CONTROLLED ANALGESIA SYRINGE INTRAVENOUS
Status: DISCONTINUED | OUTPATIENT
Start: 2023-01-06 | End: 2023-01-07 | Stop reason: HOSPADM

## 2023-01-06 RX ORDER — CEFAZOLIN SODIUM/WATER 2 G/20 ML
2 SYRINGE (ML) INTRAVENOUS
Status: COMPLETED | OUTPATIENT
Start: 2023-01-06 | End: 2023-01-06

## 2023-01-06 RX ORDER — DIPHENHYDRAMINE HYDROCHLORIDE 50 MG/ML
12.5 INJECTION INTRAMUSCULAR; INTRAVENOUS ONCE
Status: COMPLETED | OUTPATIENT
Start: 2023-01-06 | End: 2023-01-06

## 2023-01-06 RX ORDER — LIDOCAINE HYDROCHLORIDE 20 MG/ML
INJECTION, SOLUTION INFILTRATION; PERINEURAL PRN
Status: DISCONTINUED | OUTPATIENT
Start: 2023-01-06 | End: 2023-01-06

## 2023-01-06 RX ORDER — CALCIUM CARBONATE 500 MG/1
500 TABLET, CHEWABLE ORAL 4 TIMES DAILY PRN
Status: DISCONTINUED | OUTPATIENT
Start: 2023-01-06 | End: 2023-01-07 | Stop reason: HOSPADM

## 2023-01-06 RX ORDER — GABAPENTIN 100 MG/1
100 CAPSULE ORAL
Status: COMPLETED | OUTPATIENT
Start: 2023-01-06 | End: 2023-01-06

## 2023-01-06 RX ORDER — CEFAZOLIN SODIUM 1 G/3ML
1 INJECTION, POWDER, FOR SOLUTION INTRAMUSCULAR; INTRAVENOUS EVERY 8 HOURS
Status: DISCONTINUED | OUTPATIENT
Start: 2023-01-06 | End: 2023-01-07

## 2023-01-06 RX ORDER — ONDANSETRON 4 MG/1
4 TABLET, ORALLY DISINTEGRATING ORAL EVERY 30 MIN PRN
Status: DISCONTINUED | OUTPATIENT
Start: 2023-01-06 | End: 2023-01-06 | Stop reason: HOSPADM

## 2023-01-06 RX ORDER — PROPOFOL 10 MG/ML
INJECTION, EMULSION INTRAVENOUS PRN
Status: DISCONTINUED | OUTPATIENT
Start: 2023-01-06 | End: 2023-01-06

## 2023-01-06 RX ORDER — FENTANYL CITRATE 0.05 MG/ML
25 INJECTION, SOLUTION INTRAMUSCULAR; INTRAVENOUS EVERY 5 MIN PRN
Status: DISCONTINUED | OUTPATIENT
Start: 2023-01-06 | End: 2023-01-06 | Stop reason: HOSPADM

## 2023-01-06 RX ORDER — POLYETHYLENE GLYCOL 3350 17 G/17G
17 POWDER, FOR SOLUTION ORAL DAILY
Status: DISCONTINUED | OUTPATIENT
Start: 2023-01-07 | End: 2023-01-07 | Stop reason: HOSPADM

## 2023-01-06 RX ORDER — ONDANSETRON 2 MG/ML
4 INJECTION INTRAMUSCULAR; INTRAVENOUS EVERY 30 MIN PRN
Status: DISCONTINUED | OUTPATIENT
Start: 2023-01-06 | End: 2023-01-06 | Stop reason: HOSPADM

## 2023-01-06 RX ORDER — ACETAMINOPHEN 325 MG/1
975 TABLET ORAL EVERY 8 HOURS
Status: DISCONTINUED | OUTPATIENT
Start: 2023-01-06 | End: 2023-01-07 | Stop reason: HOSPADM

## 2023-01-06 RX ORDER — ACETAMINOPHEN 325 MG/1
650 TABLET ORAL EVERY 4 HOURS PRN
Status: DISCONTINUED | OUTPATIENT
Start: 2023-01-09 | End: 2023-01-07 | Stop reason: HOSPADM

## 2023-01-06 RX ORDER — ONDANSETRON 2 MG/ML
INJECTION INTRAMUSCULAR; INTRAVENOUS PRN
Status: DISCONTINUED | OUTPATIENT
Start: 2023-01-06 | End: 2023-01-06

## 2023-01-06 RX ORDER — PROPOFOL 10 MG/ML
INJECTION, EMULSION INTRAVENOUS CONTINUOUS PRN
Status: DISCONTINUED | OUTPATIENT
Start: 2023-01-06 | End: 2023-01-06

## 2023-01-06 RX ORDER — CEFAZOLIN SODIUM/WATER 2 G/20 ML
2 SYRINGE (ML) INTRAVENOUS SEE ADMIN INSTRUCTIONS
Status: DISCONTINUED | OUTPATIENT
Start: 2023-01-06 | End: 2023-01-06 | Stop reason: HOSPADM

## 2023-01-06 RX ORDER — FLUTICASONE PROPIONATE 50 MCG
1-2 SPRAY, SUSPENSION (ML) NASAL DAILY PRN
Status: DISCONTINUED | OUTPATIENT
Start: 2023-01-06 | End: 2023-01-07 | Stop reason: HOSPADM

## 2023-01-06 RX ORDER — METHOCARBAMOL 500 MG/1
500 TABLET, FILM COATED ORAL EVERY 6 HOURS PRN
Status: DISCONTINUED | OUTPATIENT
Start: 2023-01-06 | End: 2023-01-07 | Stop reason: HOSPADM

## 2023-01-06 RX ORDER — HYDROMORPHONE HCL IN WATER/PF 6 MG/30 ML
0.4 PATIENT CONTROLLED ANALGESIA SYRINGE INTRAVENOUS EVERY 5 MIN PRN
Status: DISCONTINUED | OUTPATIENT
Start: 2023-01-06 | End: 2023-01-06 | Stop reason: HOSPADM

## 2023-01-06 RX ORDER — LIDOCAINE 40 MG/G
CREAM TOPICAL
Status: DISCONTINUED | OUTPATIENT
Start: 2023-01-06 | End: 2023-01-07 | Stop reason: HOSPADM

## 2023-01-06 RX ORDER — OXYCODONE HYDROCHLORIDE 5 MG/1
10 TABLET ORAL EVERY 4 HOURS PRN
Status: DISCONTINUED | OUTPATIENT
Start: 2023-01-06 | End: 2023-01-07 | Stop reason: HOSPADM

## 2023-01-06 RX ORDER — AMLODIPINE BESYLATE 2.5 MG/1
2.5 TABLET ORAL DAILY
Status: DISCONTINUED | OUTPATIENT
Start: 2023-01-07 | End: 2023-01-07 | Stop reason: HOSPADM

## 2023-01-06 RX ORDER — LABETALOL HYDROCHLORIDE 5 MG/ML
10 INJECTION, SOLUTION INTRAVENOUS
Status: DISCONTINUED | OUTPATIENT
Start: 2023-01-06 | End: 2023-01-06 | Stop reason: HOSPADM

## 2023-01-06 RX ORDER — OXYCODONE HYDROCHLORIDE 5 MG/1
5 TABLET ORAL EVERY 4 HOURS PRN
Status: DISCONTINUED | OUTPATIENT
Start: 2023-01-06 | End: 2023-01-07 | Stop reason: HOSPADM

## 2023-01-06 RX ADMIN — GABAPENTIN 100 MG: 100 CAPSULE ORAL at 11:52

## 2023-01-06 RX ADMIN — ROCURONIUM BROMIDE 50 MG: 50 INJECTION, SOLUTION INTRAVENOUS at 13:23

## 2023-01-06 RX ADMIN — HYDROMORPHONE HYDROCHLORIDE 0.2 MG: 0.2 INJECTION, SOLUTION INTRAMUSCULAR; INTRAVENOUS; SUBCUTANEOUS at 17:48

## 2023-01-06 RX ADMIN — LIDOCAINE HYDROCHLORIDE 100 MG: 20 INJECTION, SOLUTION INFILTRATION; PERINEURAL at 13:23

## 2023-01-06 RX ADMIN — DEXMEDETOMIDINE HYDROCHLORIDE 12 MCG: 100 INJECTION, SOLUTION INTRAVENOUS at 15:13

## 2023-01-06 RX ADMIN — SUGAMMADEX 200 MG: 100 INJECTION, SOLUTION INTRAVENOUS at 15:23

## 2023-01-06 RX ADMIN — PROPOFOL 30 MCG/KG/MIN: 10 INJECTION, EMULSION INTRAVENOUS at 13:37

## 2023-01-06 RX ADMIN — SODIUM CHLORIDE: 9 INJECTION, SOLUTION INTRAVENOUS at 20:47

## 2023-01-06 RX ADMIN — PROPOFOL 150 MG: 10 INJECTION, EMULSION INTRAVENOUS at 13:23

## 2023-01-06 RX ADMIN — HYDROMORPHONE HYDROCHLORIDE 0.5 MG: 1 INJECTION, SOLUTION INTRAMUSCULAR; INTRAVENOUS; SUBCUTANEOUS at 14:22

## 2023-01-06 RX ADMIN — ROCURONIUM BROMIDE 10 MG: 50 INJECTION, SOLUTION INTRAVENOUS at 14:52

## 2023-01-06 RX ADMIN — DEXMEDETOMIDINE HYDROCHLORIDE 8 MCG: 100 INJECTION, SOLUTION INTRAVENOUS at 14:17

## 2023-01-06 RX ADMIN — SENNOSIDES AND DOCUSATE SODIUM 1 TABLET: 50; 8.6 TABLET ORAL at 20:51

## 2023-01-06 RX ADMIN — DIPHENHYDRAMINE HYDROCHLORIDE 12.5 MG: 50 INJECTION, SOLUTION INTRAMUSCULAR; INTRAVENOUS at 16:53

## 2023-01-06 RX ADMIN — SODIUM CHLORIDE, POTASSIUM CHLORIDE, SODIUM LACTATE AND CALCIUM CHLORIDE: 600; 310; 30; 20 INJECTION, SOLUTION INTRAVENOUS at 15:13

## 2023-01-06 RX ADMIN — MIDAZOLAM 0.5 MG: 1 INJECTION INTRAMUSCULAR; INTRAVENOUS at 16:30

## 2023-01-06 RX ADMIN — FENTANYL CITRATE 50 MCG: 50 INJECTION, SOLUTION INTRAMUSCULAR; INTRAVENOUS at 15:53

## 2023-01-06 RX ADMIN — ACETAMINOPHEN 975 MG: 325 TABLET, FILM COATED ORAL at 20:51

## 2023-01-06 RX ADMIN — SODIUM CHLORIDE, POTASSIUM CHLORIDE, SODIUM LACTATE AND CALCIUM CHLORIDE: 600; 310; 30; 20 INJECTION, SOLUTION INTRAVENOUS at 11:35

## 2023-01-06 RX ADMIN — CEFAZOLIN 1 G: 1 INJECTION, POWDER, FOR SOLUTION INTRAMUSCULAR; INTRAVENOUS at 20:49

## 2023-01-06 RX ADMIN — Medication 2 G: at 13:28

## 2023-01-06 RX ADMIN — FENTANYL CITRATE 50 MCG: 50 INJECTION, SOLUTION INTRAMUSCULAR; INTRAVENOUS at 16:15

## 2023-01-06 RX ADMIN — ONDANSETRON 4 MG: 2 INJECTION INTRAMUSCULAR; INTRAVENOUS at 15:13

## 2023-01-06 RX ADMIN — DEXAMETHASONE SODIUM PHOSPHATE 4 MG: 4 INJECTION, SOLUTION INTRA-ARTICULAR; INTRALESIONAL; INTRAMUSCULAR; INTRAVENOUS; SOFT TISSUE at 13:45

## 2023-01-06 RX ADMIN — FENTANYL CITRATE 50 MCG: 50 INJECTION, SOLUTION INTRAMUSCULAR; INTRAVENOUS at 14:08

## 2023-01-06 RX ADMIN — MIDAZOLAM 0.5 MG: 1 INJECTION INTRAMUSCULAR; INTRAVENOUS at 16:39

## 2023-01-06 RX ADMIN — HYDROMORPHONE HYDROCHLORIDE 0.2 MG: 0.2 INJECTION, SOLUTION INTRAMUSCULAR; INTRAVENOUS; SUBCUTANEOUS at 18:09

## 2023-01-06 RX ADMIN — METHOCARBAMOL 500 MG: 500 TABLET ORAL at 20:51

## 2023-01-06 RX ADMIN — ROCURONIUM BROMIDE 20 MG: 50 INJECTION, SOLUTION INTRAVENOUS at 14:14

## 2023-01-06 RX ADMIN — FENTANYL CITRATE 50 MCG: 50 INJECTION, SOLUTION INTRAMUSCULAR; INTRAVENOUS at 13:23

## 2023-01-06 ASSESSMENT — ACTIVITIES OF DAILY LIVING (ADL)
ADLS_ACUITY_SCORE: 33
ADLS_ACUITY_SCORE: 20

## 2023-01-06 ASSESSMENT — ENCOUNTER SYMPTOMS: SEIZURES: 1

## 2023-01-06 ASSESSMENT — LIFESTYLE VARIABLES: TOBACCO_USE: 1

## 2023-01-06 NOTE — INTERVAL H&P NOTE
"I have reviewed the surgical (or preoperative) H&P that is linked to this encounter, and examined the patient. There are no significant changes    Clinical Conditions Present on Arrival:  Clinically Significant Risk Factors Present on Admission                    # Obesity: Estimated body mass index is 33.33 kg/m  as calculated from the following:    Height as of this encounter: 1.658 m (5' 5.28\").    Weight as of this encounter: 91.6 kg (202 lb).       "

## 2023-01-06 NOTE — ANESTHESIA CARE TRANSFER NOTE
Patient: Negin Jonas    Procedure: Procedure(s):  Cervical 3 to Cervical 5 Anterior Cervical Discectomy Fusion          Diagnosis: Cervical spinal stenosis [M48.02]  Diagnosis Additional Information: No value filed.    Anesthesia Type:   General     Note:    Oropharynx: oropharynx clear of all foreign objects and spontaneously breathing  Level of Consciousness: awake  Oxygen Supplementation: face mask  Level of Supplemental Oxygen (L/min / FiO2): 6  Independent Airway: airway patency satisfactory and stable  Dentition: dentition unchanged  Vital Signs Stable: post-procedure vital signs reviewed and stable  Report to RN Given: handoff report given  Patient transferred to: PACU    Handoff Report: Identifed the Patient, Identified the Reponsible Provider, Reviewed the pertinent medical history, Discussed the surgical course, Reviewed Intra-OP anesthesia mangement and issues during anesthesia, Set expectations for post-procedure period and Allowed opportunity for questions and acknowledgement of understanding      Vitals:  Vitals Value Taken Time   /93    Temp 36.4    Pulse 73 01/06/23 1552   Resp 14 01/06/23 1552   SpO2 96 % 01/06/23 1552   Vitals shown include unvalidated device data.    Electronically Signed By: RANDALL Hollingsworth CRNA  January 6, 2023  3:53 PM

## 2023-01-06 NOTE — ANESTHESIA PROCEDURE NOTES
Airway       Patient location during procedure: OR       Procedure Start/Stop Times: 1/6/2023 1:26 PM  Staff -        Anesthesiologist:  Ameya Keller DO       CRNA: Juan Werner APRN CRNA       Other Anesthesia Staff: Laura Zamarripa       Performed By: IMTIAZ  Consent for Airway        Urgency: elective  Indications and Patient Condition       Indications for airway management: bakari-procedural       Induction type:intravenous       Mask difficulty assessment: 2 - vent by mask + OA or adjuvant +/- NMBA    Final Airway Details       Final airway type: endotracheal airway       Successful airway: ETT - single  Endotracheal Airway Details        ETT size (mm): 7.0       Cuffed: yes       Successful intubation technique: video laryngoscopy       VL Blade Size: Caruso 3       Grade View of Cords: 1       Adjucts: stylet       Position: Right       Measured from: lips       Secured at (cm): 20       Bite block used: None    Post intubation assessment        Placement verified by: capnometry and equal breath sounds        Number of attempts at approach: 1       Number of other approaches attempted: 0       Secured with: pink tape       Ease of procedure: easy       Dentition: Intact and Unchanged    Medication(s) Administered   Medication Administration Time: 1/6/2023 1:26 PM

## 2023-01-06 NOTE — OR NURSING
Dr Anderson called to bedside.  Pt restless and not following any commands, since arrival from OR.  Pain meds given with no relief and bladder scan done for 73 ml.  .  Pupils are 2+ equal and reactive.

## 2023-01-06 NOTE — OP NOTE
Date of surgery: January 6, 2023  Surgeon: Rick Franco MD  Assistant: Liss Mejia NP  (Note: The assistant was present for and assisted with the entire surgery, and his/her role as an assistant was crucial for aid in positioning, exposure, suctioning, retraction, and closure)    Preoperative diagnosis: Cervical stenosis  Postoperative diagnosis: Cervical stenosis    Procedure:  1.  C3-4 anterior discectomy and interbody arthrodesis  2.  C4-5 anterior discectomy and interbody arthrodesis  3.  C3-4 insertion of intervertebral graft and allograft  4.  C4-5 insertion of intervertebral graft and allograft  5.  C3 to C5 anterior spinal instrumentation with insertion of Medtronic Zevo plate and vertebral body screws  6.  Use of intraoperative microscope and fluoroscopy    EBL: 50 mL    Indications: 77 year old female who presented with progressive neck and arm pain and weakness.  MRI demonstrated right foraminal stenosis from C3 to C5.  Non-surgical management without improvement.  Risks, benefits, indications, and alternatives were discussed with the patient and family in detail.  All their questions were answered, and they wished to proceed with surgery.    Description of surgery: The patient was positioned supine.  Sterile prepping and draping procedures were performed.  Antibiotics were administered and timeout was performed.  A right horizontal neck incision was performed.  The monopolar was used to divide the platysma, and the Metzenbaum scissors were used to create a plane in the fascia medial to the sternocleidomastoid muscle.  Blunt dissection was used to come down upon the anterior cervical spine.  The spinal needle was used to verify the correct level.  The monopolar was used to elevate the longus coli, and the Trimline retractor was inserted.  The microscope was brought into the field, and under microscopy the 15 blade was used to perform an annulotomy in the C4-5 disc space.  A complete discectomy  was performed with combination of the high-speed drill, curettes, and pituitary rongeurs.  Interbody arthrodesis was performed with a high-speed drill.  The posterior osteophytes were removed with the drill, and the posterior longitudinal ligament was removed with the Kerrison rongeurs, with decompression of the bilateral neural foramina.  An intervertebral graft was delivered in the disc space at C4-5.  Next, the 15 blade was used to perform an annulotomy in the C3-4 disc space.  A complete discectomy was performed with combination of the high-speed drill, curettes, and pituitary rongeurs.  Interbody arthrodesis was performed with a high-speed drill.  The posterior osteophytes were removed with the drill, and the posterior longitudinal ligament was removed with the Kerrison rongeurs, with decompression of the bilateral neural foramina.  An intervertebral graft was delivered in the disc space at C3-4.  Subsequently, a Medtronic Zevo plate was positioned, and fixed into place from C3 to C5 with bilateral vertebral body screws at C3, C4, and C5.  Hemostasis was achieved.  Antibiotic irrigation was performed.  The platysma and dermal layers were closed with 3-0 Vicryl sutures, and the skin was closed with a running subcuticular stitch.  There were no intraprocedural complications.

## 2023-01-06 NOTE — BRIEF OP NOTE
[facility] Brief Operative Note    Pre-operative diagnosis: Cervical spinal stenosis [M48.02]   Post-operative diagnosis As above   Procedure: Procedure(s):  Cervical 3 to Cervical 5 Anterior Cervical Discectomy Fusion      Surgeon(s): Surgeon(s) and Role:     * Rick Franco MD - Primary   Estimated blood loss: 50 mL    Specimens: * No specimens in log *   Findings: See op note

## 2023-01-06 NOTE — ANESTHESIA PREPROCEDURE EVALUATION
Anesthesia Pre-Procedure Evaluation    Patient: Negin Jonas   MRN: [id number] : 1945        Procedure : Procedure(s):  Cervical 3 to Cervical 5 Anterior Cervical Discectomy Fusion             Past Medical History:   Diagnosis Date     Advanced directives, counseling/discussion 3/7/2012     Anxiety attack 2006    EEG normal, improved with lamictal     Chronic rhinitis 2011     Closed head injury 2004    subarachnoid hemorrage/cerebral contusion with sequellae of significant diminished short term memory, word finding difficulties and emotional lability, was in coma for 2 months, on seizure prophylaxsis with lamictal, f/b neuro annually     Generalized anxiety disorder      Major depression      Major depression in complete remission (H) 2011    was seen by psychiatrist in past, has tried Effexor and Seroquel and Hiram's Wart     Migraine headache     resolved s/p closed head injury     Panic attacks      Psoriasis 2011     Short-term memory loss     secondary to closed head injury     Subarachnoid hemorrhage following injury with loss of consciousness and death due to other cause prior to regaining consciousness, initial encounter 2004      Past Surgical History:   Procedure Laterality Date     ARTHROPLASTY KNEE Right 03/10/2021    Procedure: ARTHROPLASTY, KNEE, TOTAL;  Surgeon: Hamilton Martinez MD;  Location:  OR     ESOPHAGOSCOPY, GASTROSCOPY, DUODENOSCOPY (EGD), COMBINED N/A 2015    Procedure: COMBINED ESOPHAGOSCOPY, GASTROSCOPY, DUODENOSCOPY (EGD);  Surgeon: Duane, William Charles, MD;  Location:  OR     ESOPHAGOSCOPY, GASTROSCOPY, DUODENOSCOPY (EGD), COMBINED N/A 2015    Procedure: COMBINED ESOPHAGOSCOPY, GASTROSCOPY, DUODENOSCOPY (EGD), BIOPSY SINGLE OR MULTIPLE;  Surgeon: Duane, William Charles, MD;  Location:  OR     EYE SURGERY Bilateral 2022    cataract     HC REMOVAL GALLBLADDER       HYSTERECTOMY, PAP NO LONGER INDICATED      with  ovaries     IR MISCELLANEOUS PROCEDURE  08/21/2004     RELEASE CARPAL TUNNEL BILATERAL       ZZC TOTAL KNEE ARTHROPLASTY Left 06/10/2020    DML      Allergies   Allergen Reactions     Codeine Sulfate      Seasonal Allergies       Social History     Tobacco Use     Smoking status: Former     Packs/day: 1.00     Years: 25.00     Pack years: 25.00     Types: Cigarettes     Smokeless tobacco: Never     Tobacco comments:     Quit 15 years ago.   Substance Use Topics     Alcohol use: No      Wt Readings from Last 1 Encounters:   01/06/23 91.6 kg (202 lb)        Anesthesia Evaluation   Pt has had prior anesthetic. Type: General and MAC.    No history of anesthetic complications       ROS/MED HX  ENT/Pulmonary:     (+) tobacco use, Past use, 25  Pack-Year Hx,   (-) sleep apnea and DAYNA risk factors   Neurologic: Comment: TBI in 2004 with short term memory loss, emotional liability, and word finding difficulties. Vertigo    Cervical MRI 11/9/2022  IMPRESSION:  1. Diffuse degenerative change of the cervical spine as detailed above.  2. No spinal canal stenosis of the cervical spine.  3. Moderate neural foraminal stenosis on the right at C3-C4 and on the right at C4-C5.    Cervicalgia  Right shoulder pain  Right arm pain  Right arm weakness  Right hand numbness     Discussed options, she has too much pain and weakness to be able to participate in PT  Will plan for C3-5 ACDF    (+) seizures, last seizure: prophylactic, never had one, features: Prophylaxis for seizures post TBI, without clear history of seizure disorder. ,     Cardiovascular:     (+) Dyslipidemia -----Previous cardiac testing   Echo: Date: Results:    Stress Test: Date: Results:    ECG Reviewed: Date: 1/29/12 Results:  Normal Sinus Rhythm  Cath: Date: Results:      METS/Exercise Tolerance: >4 METS    Hematologic:  - neg hematologic  ROS     Musculoskeletal:   (+) arthritis,     GI/Hepatic:     (+) GERD, Asymptomatic on medication,  (-) liver disease    Renal/Genitourinary:    (-) renal disease   Endo:     (+) Obesity,  (-) Type II DM and thyroid disease   Psychiatric/Substance Use:     (+) psychiatric history depression and anxiety (panic attacks)     Infectious Disease:  - neg infectious disease ROS     Malignancy:  - neg malignancy ROS     Other:            Physical Exam    Airway        Mallampati: II   TM distance: > 3 FB   Neck ROM: full   Mouth opening: > 3 cm    Respiratory Devices and Support         Dental       (+) upper dentures    B=Bridge, C=Chipped, L=Loose, M=Missing    Cardiovascular   cardiovascular exam normal          Pulmonary   pulmonary exam normal        breath sounds clear to auscultation           OUTSIDE LABS:  CBC:   Lab Results   Component Value Date    WBC 5.9 12/28/2022    WBC 6.2 02/15/2021    HGB 11.4 (L) 12/28/2022    HGB 9.6 (L) 03/11/2021    HCT 37.1 12/28/2022    HCT 38.5 02/15/2021     12/28/2022     02/15/2021     BMP:   Lab Results   Component Value Date     12/28/2022     01/06/2020    POTASSIUM 4.4 12/28/2022    POTASSIUM 3.9 01/06/2020    CHLORIDE 108 12/28/2022    CHLORIDE 108 01/06/2020    CO2 26 12/28/2022    CO2 26 01/06/2020    BUN 19 12/28/2022    BUN 24 01/06/2020    CR 0.82 12/28/2022    CR 0.73 01/06/2020    GLC 98 12/28/2022     (H) 01/06/2020     COAGS: No results found for: PTT, INR, FIBR  POC:   Lab Results   Component Value Date     (H) 03/11/2021     HEPATIC:   Lab Results   Component Value Date    ALBUMIN 3.6 12/28/2022    PROTTOTAL 7.2 12/28/2022    ALT 20 12/28/2022    AST 14 12/28/2022    ALKPHOS 96 12/28/2022    BILITOTAL 0.2 12/28/2022     OTHER:   Lab Results   Component Value Date    ZAINAB 9.9 12/28/2022    TSH 2.76 12/28/2022       Anesthesia Plan    ASA Status:  3   NPO Status:  NPO Appropriate    Anesthesia Type: General.     - Airway: ETT   Induction: Intravenous.   Maintenance: Balanced.   Techniques and Equipment:     - Lines/Monitors: 2nd IV      Consents    Anesthesia Plan(s) and associated risks, benefits, and realistic alternatives discussed. Questions answered and patient/representative(s) expressed understanding.    - Discussed:     - Discussed with:  Patient      - Extended Intubation/Ventilatory Support Discussed: No.      - Patient is DNR/DNI Status: No    Use of blood products discussed: Yes.     - Discussed with: Patient.     - Consented: consented to blood products            Reason for refusal: other.     Postoperative Care    Pain management: IV analgesics, Oral pain medications, Multi-modal analgesia.   PONV prophylaxis: Ondansetron (or other 5HT-3), Dexamethasone or Solumedrol, Background Propofol Infusion     Comments:                Ameya Keller DO

## 2023-01-07 ENCOUNTER — APPOINTMENT (OUTPATIENT)
Dept: PHYSICAL THERAPY | Facility: CLINIC | Age: 78
End: 2023-01-07
Attending: NURSE PRACTITIONER
Payer: COMMERCIAL

## 2023-01-07 VITALS
TEMPERATURE: 97.4 F | SYSTOLIC BLOOD PRESSURE: 125 MMHG | RESPIRATION RATE: 16 BRPM | HEART RATE: 82 BPM | WEIGHT: 202 LBS | HEIGHT: 65 IN | OXYGEN SATURATION: 97 % | BODY MASS INDEX: 33.66 KG/M2 | DIASTOLIC BLOOD PRESSURE: 68 MMHG

## 2023-01-07 PROCEDURE — 97116 GAIT TRAINING THERAPY: CPT | Mod: GP

## 2023-01-07 PROCEDURE — 250N000013 HC RX MED GY IP 250 OP 250 PS 637: Performed by: NURSE PRACTITIONER

## 2023-01-07 PROCEDURE — 97530 THERAPEUTIC ACTIVITIES: CPT | Mod: GP

## 2023-01-07 PROCEDURE — 250N000011 HC RX IP 250 OP 636: Performed by: NURSE PRACTITIONER

## 2023-01-07 PROCEDURE — 97161 PT EVAL LOW COMPLEX 20 MIN: CPT | Mod: GP

## 2023-01-07 RX ORDER — OXYCODONE HYDROCHLORIDE 5 MG/1
5-10 TABLET ORAL EVERY 4 HOURS PRN
Qty: 40 TABLET | Refills: 0 | Status: SHIPPED | OUTPATIENT
Start: 2023-01-07 | End: 2023-01-27

## 2023-01-07 RX ORDER — AMOXICILLIN 250 MG
1 CAPSULE ORAL 2 TIMES DAILY PRN
Qty: 60 TABLET | Refills: 0 | Status: SHIPPED | OUTPATIENT
Start: 2023-01-07 | End: 2023-03-22

## 2023-01-07 RX ORDER — METHOCARBAMOL 500 MG/1
500 TABLET, FILM COATED ORAL EVERY 6 HOURS PRN
Qty: 50 TABLET | Refills: 0 | Status: SHIPPED | OUTPATIENT
Start: 2023-01-07 | End: 2023-03-22

## 2023-01-07 RX ADMIN — HYDROXYZINE HYDROCHLORIDE 10 MG: 10 TABLET ORAL at 02:18

## 2023-01-07 RX ADMIN — AMLODIPINE BESYLATE 2.5 MG: 2.5 TABLET ORAL at 09:42

## 2023-01-07 RX ADMIN — POLYETHYLENE GLYCOL 3350 17 G: 17 POWDER, FOR SOLUTION ORAL at 09:42

## 2023-01-07 RX ADMIN — CITALOPRAM HYDROBROMIDE 20 MG: 20 TABLET ORAL at 09:42

## 2023-01-07 RX ADMIN — LAMOTRIGINE 200 MG: 100 TABLET ORAL at 09:42

## 2023-01-07 RX ADMIN — CEFAZOLIN 1 G: 1 INJECTION, POWDER, FOR SOLUTION INTRAMUSCULAR; INTRAVENOUS at 05:15

## 2023-01-07 RX ADMIN — PANTOPRAZOLE SODIUM 40 MG: 40 TABLET, DELAYED RELEASE ORAL at 09:42

## 2023-01-07 RX ADMIN — ACETAMINOPHEN 975 MG: 325 TABLET, FILM COATED ORAL at 05:16

## 2023-01-07 RX ADMIN — SENNOSIDES AND DOCUSATE SODIUM 1 TABLET: 50; 8.6 TABLET ORAL at 09:42

## 2023-01-07 RX ADMIN — OXYCODONE HYDROCHLORIDE 5 MG: 5 TABLET ORAL at 05:16

## 2023-01-07 RX ADMIN — FERROUS SULFATE TAB 325 MG (65 MG ELEMENTAL FE) 325 MG: 325 (65 FE) TAB at 09:42

## 2023-01-07 ASSESSMENT — ACTIVITIES OF DAILY LIVING (ADL)
ADLS_ACUITY_SCORE: 20

## 2023-01-07 NOTE — PROGRESS NOTES
A/O x4. Up SBA to BR. Voids well. RY drain removed. Dressing changed. Spouse @ bedside. Discharge instruction provided. Meds, AVS and belongings sent home with pt.

## 2023-01-07 NOTE — PLAN OF CARE
OT: Order received, chart reviewed and discussed with care team. OT not indicated due to patient mobilizing well with physical therapy, has good family support. Defer discharge recommendations to physical therapy. Will complete orders.

## 2023-01-07 NOTE — PROGRESS NOTES
01/07/23 1015   Appointment Info   Signing Clinician's Name / Credentials (PT) Lucita Benoit, PT, DPT   Quick Adds   Quick Adds Certification   Living Environment   People in Home spouse   Current Living Arrangements house   Home Accessibility stairs to enter home   Number of Stairs, Main Entrance 7   Stair Railings, Main Entrance railing on left side (ascending)   Transportation Anticipated family or friend will provide   Self-Care   Usual Activity Tolerance good   Current Activity Tolerance good   Regular Exercise No   Equipment Currently Used at Home none  (could borrow a FWW from her sister if needed)   Fall history within last six months no   Activity/Exercise/Self-Care Comment Pt independent at home w/ mobility and self cares,  assists w/ meds d/t pt's decreased memory. They are both retired and  is prepared to assist her at home.   General Information   Onset of Illness/Injury or Date of Surgery 01/06/23   Referring Physician Liss Mejia NP   Patient/Family Therapy Goals Statement (PT) to get better   Pertinent History of Current Problem (include personal factors and/or comorbidities that impact the POC) Procedure on 1/6: C3-5 anterior discectomy and interbody arthrodesis, C3-5 insertion of intervertebral graft and allograft , C3 to C5 anterior spinal instrumentation with insertion plate and vertebral body screws. Secondary to cervical stenosis. PMH: h/o TBI in 2004 w/ subsequent memory loss and seizure d/o   Existing Precautions/Restrictions fall;spinal   Weight-Bearing Status - LLE full weight-bearing   Weight-Bearing Status - RLE full weight-bearing   Cognition   Affect/Mental Status (Cognition) WFL   Cognitive Status Comments some memory deficits at baseline, from TBI in 2004   Pain Assessment   Patient Currently in Pain No  (very mild, tolerable)   Integumentary/Edema   Integumentary/Edema Comments incision w/ bandaging   Posture    Posture Kyphosis   Range of Motion (ROM)    ROM Comment limited cervical ROM d/t spinal precautions; otherwise intact   Strength (Manual Muscle Testing)   Strength (Manual Muscle Testing) strength is WFL   Strength Comments even B LE strength   Bed Mobility   Comment, (Bed Mobility) mod I w/ log roll   Transfers   Comment, (Transfers) SBA to stand with or without device, steady   Gait/Stairs (Locomotion)   [place] Level (Gait) supervision   Assistive Device (Gait) walker, front-wheeled   Distance in Feet 100ft   Distance in Feet (Gait) 350ft   Pattern (Gait) step-through   Comment, (Gait/Stairs) no overt LOB or unsteadiness, tolerated well without a device   Balance   Balance no deficits were identified   Balance Comments pt reports she sometimes feels wobbly at baseline, but no history of falls   Sensory Examination   Sensory Perception patient reports no sensory changes   Clinical Impression   Criteria for Skilled Therapeutic Intervention Yes, treatment indicated   PT Diagnosis (PT) impaired functional mobility   Influenced by the following impairments spinal precautions, safety awareness   Functional limitations due to impairments bed mob, transfers, ambulation, stairs   Clinical Presentation (PT Evaluation Complexity) Stable/Uncomplicated   Clinical Presentation Rationale clinical judgement   Clinical Decision Making (Complexity) low complexity   Planned Therapy Interventions (PT) bed mobility training;gait training;patient/family education;stair training;transfer training   Risk & Benefits of therapy have been explained evaluation/treatment results reviewed;care plan/treatment goals reviewed;risks/benefits reviewed;current/potential barriers reviewed;participants voiced agreement with care plan;participants included;patient   PT Total Evaluation Time   PT Eval, Low Complexity Minutes (67545) 12   Therapy Certification   Start of care date 01/07/23   Certification date from 01/07/23   Certification date to 01/07/23   Medical Diagnosis cervical  stenosis   Physical Therapy Goals   PT Frequency One time eval and treatment only   PT Predicted Duration/Target Date for Goal Attainment 01/07/23   PT Goals Bed Mobility;Transfers;Gait;Stairs   PT: Bed Mobility Modified independent;Goal Met   PT: Transfers Independent;Sit to/from stand;Bed to/from chair   PT: Gait Independent;Greater than 200 feet   PT: Stairs Modified independent;7 stairs;Rail on left   Interventions   Interventions Quick Adds Gait Training;Therapeutic Activity   Therapeutic Activity   Therapeutic Activities: dynamic activities to improve functional performance Minutes (80428) 15   Treatment Detail/Skilled Intervention Reviewed spinal precautions w/  and pt, including bed mobility, in/out of car, lifting restrictions, twisting/ bending.  agreeable to take over any lifting at home. Pt verbally agreeable to precautions, needed to review multiple examples to follow precautions. Also reviewed walking program/progression without a device.   Gait Training   Gait Training Minutes (37991) 15   Symptoms Noted During/After Treatment (Gait Training) none   Treatment Detail/Skilled Intervention Ambulation with FWW at first, pt tolerating well and steady so progressed to without a device where pt continued to demonstrate steady gait, occasional path deviation as she is distracted but no overt LOB. Stairs x 8 w/ L railing up and down, steady and reciprocal. Pt reports walking feels close to normal and she doesn't feel she needs the walker.   [place] Level (Gait Training) independent   Physical Assistance Level (Gait Training) verbal cues   Weight Bearing (Gait Training) full weight-bearing   PT Discharge Planning   PT Plan goals met, d/c   PT Discharge Recommendation (DC Rec) home with assist   PT Rationale for DC Rec Anticipated that with assist from , pt will be able to return home. No need for AD at this point. May not be able to follow spinal precautions all of the time d/t some  memory loss, but  agreeable to assist with this as needed as well.   PT Brief overview of current status ambulation x 350ft without device, stairs w/ L railing   Total Session Time   Timed Code Treatment Minutes 30   Total Session Time (sum of timed and untimed services) 42       [facility]  OUTPATIENT PHYSICAL THERAPY EVALUATION  PLAN OF TREATMENT FOR OUTPATIENT REHABILITATION  (COMPLETE FOR INITIAL CLAIMS ONLY)  Patient's Last Name, First Name, M.I.  YOB: 1945  Luis Carlos Jonasa  GUS                        Provider's Name  [facility] Medical Record No.  [id number]                             Onset Date:  01/06/23   Start of Care Date:  01/07/23   Type:     _X_PT   ___OT   ___SLP Medical Diagnosis:  cervical stenosis              PT Diagnosis:  impaired functional mobility Visits from SOC:  1     See note for plan of treatment, functional goals and certification details    I CERTIFY THE NEED FOR THESE SERVICES FURNISHED UNDER        THIS PLAN OF TREATMENT AND WHILE UNDER MY CARE     (Physician co-signature of this document indicates review and certification of the therapy plan).

## 2023-01-07 NOTE — PROGRESS NOTES
Pt A/Ox4 sometimes forgetful, VSS on 1L O2, RY drain in place, purewick in place with adequate UOP, anterior cervical incision with dressing and is CDI, CMS intact, stood at bedside this morning with no problem.

## 2023-01-07 NOTE — PLAN OF CARE
Physical Therapy Discharge Summary    Reason for therapy discharge:    All goals and outcomes met, no further needs identified.    Progress towards therapy goal(s). See goals on Care Plan in [facility] electronic health record for goal details.  Goals met    Therapy recommendation(s):    No further therapy is recommended. Patient w/  at home to assist as needed. No need for device for ambulation.

## 2023-01-07 NOTE — ANESTHESIA POSTPROCEDURE EVALUATION
Patient: Negin Jonas    Procedure: Procedure(s):  Cervical 3 to Cervical 5 Anterior Cervical Discectomy Fusion          Anesthesia Type:  General    Note:  Disposition: Inpatient   Postop Pain Control: Uneventful            Sign Out: Well controlled pain   PONV: No   Neuro/Psych: Uneventful            Sign Out: Acceptable/Baseline neuro status   Airway/Respiratory: Uneventful            Sign Out: Acceptable/Baseline resp. status   CV/Hemodynamics: Uneventful            Sign Out: Acceptable CV status; No obvious hypovolemia; No obvious fluid overload   Other NRE: NONE   DID A NON-ROUTINE EVENT OCCUR? No    Event details/Postop Comments:  Patient with agitation and dystonic-like symptoms after arrival to PACU. Vital signs and oxygenation status stable. Fentanyl administered as thought to be related to pain without improvement. Bladder scan unremarkable. Blood glucose within normal limits. Patient restless, combative, and not following commands. Small amount of midazolam administered. Patient placed in soft limb restraints to protect herself as well as staff. Pupils normal. No focal neuro deficits. Dystonic reaction suspected (possibly related to Ondansetron). 12.5 mg of benadryl administered with marked improvement in status. Eventually returned to baseline. Pain treated with hydromorphone. OK to transfer to Ortho Spine floor.    Ameya Keller D.O.  Staff Anesthesiologist  [facility], [facility]               Last vitals:  Vitals Value Taken Time   /109 01/06/23 1930   Temp 36.6  C (97.9  F) 01/06/23 1900   Pulse 80 01/06/23 1939   Resp 17 01/06/23 1939   SpO2 95 % 01/06/23 1939       Electronically Signed By: Ameya Keller DO  January 6, 2023  9:50 PM

## 2023-01-07 NOTE — DISCHARGE SUMMARY
Admit Date: 2023  Discharge Date: 2023    PRIMARY DIAGNOSIS:  Cervical spinal stenosis.    OPERATION PERFORMED:  Anterior cervical discectomy and fusion at the C3 through C5 levels performed 2023 with Dr. Franco.    HOSPITAL COURSE:  The patient is a 77-year-old female who presented to our clinic with a diagnosis of cervical spinal stenosis in which surgical management was deemed appropriate.  The patient was prepared for surgery 2023 at which time she underwent the above-described procedure.  Please see the dictated operative report for further details.  She tolerated surgery well, and there were no complications.    Postoperatively, she has made an adequate neurosurgical recovery.  She has remained afebrile with stable vital signs.  She is tolerating a regular diet without difficulty and ambulating well.  Her operative incision is healing well.  There are no signs of drainage, erythema, or edema.  She is ready to be discharged to home on 2023 in improved condition as compared to admission.    All instructions regarding diet, activity, wound care, bowel management, and followup were given to the patient.  She was released in good condition.    DISCHARGE MEDICATIONS:    1.  Oxycodone 5 mg, instructed to take one to two tablets every four hours as needed for pain, #40, no refills.  2.  Robaxin 500 mg, instructed to take one tablet every six hours as needed for muscle spasms, #50, no refills.  3.  Senna 8.6/50, #60, instructed to take one tablet twice daily as needed for constipation.    PLAN:  Neurosurgery office will contact the patient for routine followup appointments.  She was instructed to call or return to the clinic for any worsening or changes in symptoms.    Dictated by KWASI MCCURDY        D: 2023   T: 2023   MT: GAVIN    Name:     MEENAKSHI MARTÍNEZ  MRN:      [id number]        Account:      [id number]   :      1945           Service Date:  01/07/2023                                  Discharge Date: 01/07/2023     Document: [id number]

## 2023-01-10 ENCOUNTER — TELEPHONE (OUTPATIENT)
Dept: NEUROSURGERY | Facility: OTHER | Age: 78
End: 2023-01-10

## 2023-01-27 ENCOUNTER — MYC MEDICAL ADVICE (OUTPATIENT)
Dept: NEUROSURGERY | Facility: CLINIC | Age: 78
End: 2023-01-27

## 2023-01-27 ENCOUNTER — VIRTUAL VISIT (OUTPATIENT)
Dept: NEUROSURGERY | Facility: CLINIC | Age: 78
End: 2023-01-27
Payer: COMMERCIAL

## 2023-01-27 ENCOUNTER — VIRTUAL VISIT (OUTPATIENT)
Dept: FAMILY MEDICINE | Facility: CLINIC | Age: 78
End: 2023-01-27
Payer: COMMERCIAL

## 2023-01-27 DIAGNOSIS — S06.9X9S TRAUMATIC BRAIN INJURY WITH LOSS OF CONSCIOUSNESS, SEQUELA (H): ICD-10-CM

## 2023-01-27 DIAGNOSIS — F33.41 RECURRENT MAJOR DEPRESSIVE DISORDER, IN PARTIAL REMISSION (H): ICD-10-CM

## 2023-01-27 DIAGNOSIS — G40.909 SEIZURE DISORDER (H): ICD-10-CM

## 2023-01-27 DIAGNOSIS — M54.12 CERVICAL RADICULOPATHY: ICD-10-CM

## 2023-01-27 DIAGNOSIS — Z98.890 S/P SPINAL SURGERY: Primary | ICD-10-CM

## 2023-01-27 DIAGNOSIS — R03.0 ELEVATED BP WITHOUT DIAGNOSIS OF HYPERTENSION: Primary | ICD-10-CM

## 2023-01-27 PROCEDURE — 99214 OFFICE O/P EST MOD 30 MIN: CPT | Mod: 95 | Performed by: FAMILY MEDICINE

## 2023-01-27 PROCEDURE — 99207 PR NO CHARGE NURSE ONLY: CPT

## 2023-01-27 NOTE — NURSING NOTE
Negin is a 77 year old who is being evaluated via a billable telephone visit.      What phone number would you like to be contacted at?   How would you like to obtain your AVS? Leonard    Distant Location (provider location):  Off-site  Phone call duration:5 minutes

## 2023-01-27 NOTE — PATIENT INSTRUCTIONS
Instructions for Patient    Incision   Keep your incision clean and dry at all times.   It is okay to shower, just pat the incision dry   No submerging incision in water such as pools, hot tubs, or baths for at least 8 weeks and until the incision is healed  Do not apply lotions or ointments to incision    Activity  No lifting greater than 10 pounds. No bending, twisting, or overhead reaching.  Walking is the best way to start exercise after surgery. Take short frequent walks. You may gradually increase the distance as tolerated. If you feel pain, decrease your activity, but DO NOT stop walking. Walking will help you gain strength, prevent muscle soreness and spasms, and prevent blood clots.   Avoid bed rest and prolonged sitting for longer than 30 minutes (change positions frequently while awake)  No contact sports or high impact activities such as; running/jogging, snowmobile or 4 whitman riding or any other recreational vehicles until after given clearance at one of your follow up visits    Medications   Avoid Aspirin and NSAIDs (ex: ibuprofen/Advil) until given clearance (likely at the 6-week post-op appointment).  Encouraged icing for at least 3-4 times throughout the day for 20-30 minutes at a time. Avoid heat to the incision area.   Taking stool softeners regularly can reduce constipation commonly caused by narcotic pain medications.    Contact clinic or Emergency Room if you develop:   Infection (redness, swelling, warmth, drainage, fever over 101 F)  New injury  Bladder or bowel changes or loss of control    Signs of blood clot:  Swelling and/or warmth in one or both legs  Pain or tenderness in your leg, ankle, foot, or arm   Red or discolored skin     Go to the Emergency Room   If sudden onset of severe headache, weakness, confusion, change in level of consciousness, pain, or loss of movement.  Chest pain  Trouble breathing     Post-operative appointments  Arrive 30 minutes before your 6 week, 3 month, 6  month, and 1 year post-op appointments to allow time for an x-ray before each    [facility]  [facility]  [address] [place] [address]  Telephone:  [phone]   Fax:  [phone]

## 2023-01-27 NOTE — PROGRESS NOTES
Post-op Nurse Visit:    Patient seen today per the order of  Rick Franco MD .   DOS: 1/6/23  Procedure: Cervical 3 to Cervical 5 Anterior Cervical Discectomy Fusion     Pain/Neuro Assessment  No pain at all anywhere.   Had shaking and weakness in right hand that has completely resolved.  Numbness/tingling: none  Strength: strong per patient   Denies weakness.   Reports can now open an jar and write clearly    Pain Relief Measures:  Oxycodone: only needed a few at first   Tylenol: none needed   Robaxin: only needed 1  Ice: as needed    Incision  Incision not inspected inspected. Per patient and  Edges well-approximated. No redness, swelling improving, drainage, or warmth noted.    will try to send a picture of the incision to [facility] for review       Activity  Following restrictions   Falls:  none  Patient is walking frequently without difficulty.   Legs examined in clinic; no redness, swelling, or warmth noted. Patient denies any pain in bilateral calves.   Wearing brace? No    GI/  Difficulty swallowing? No  Patient's appetite is normal  Bowel/bladder problems? No  Taking stool softeners? No     Refills/x-rays/return to work  Refills given at this appointment? No  Sent for x-rays after this appointment? No  Ordered future x-rays? Yes  Return to work discussed at this appointment? No     All of patient's questions addressed today. Patient was instructed to call with any additional questions/concerns.

## 2023-01-27 NOTE — PROGRESS NOTES
Negin is a 77 year old who is being evaluated via a billable telephone visit.      What phone number would you like to be contacted at? [phone]  How would you like to obtain your AVS? Mail a copy    Distant Location (provider location):  Off-site    Assessment & Plan     Elevated BP without diagnosis of hypertension  Patient has been running some elevated blood pressure on and off.  When she saw Dr. Jimenes for her preop in December things have been running a bit higher so she was started on 2.5 mg of amlodipine.  The surgery was quite successful and patient is doing a lot better.  Has been on and off this somewhat and for the most part blood pressures are running at most 140/80 and often lower than that.  We do have a follow-up in a couple of months so I discussed that it is okay to stay off the amlodipine and follow blood pressure.  If running high we can always restart and we certainly have the checkup as a backup.    Recurrent major depressive disorder, in partial remission (H)  Currently off citalopram and doing okay.  We will continue to follow    Seizure disorder (H)  I think she is supposed to be on lamotrigine.  Patient cannot entirely certain and so I asked her to check her medications and her  will help out with this as well.    Traumatic brain injury with loss of consciousness, sequela (H)  Nearly 20 years ago.  Memory issues and seizure disorder related to this but seems to be stable    Cervical radiculopathy  Status post surgery and doing much better.           See Patient Instructions    Return in about 2 months (around 3/27/2023) for Routine preventive, in office.    Shanel Ramirez MD  [facility]   Negin is a 77 year old accompanied by her spouse, presenting for the following health issues:  Hypertension      History of Present Illness       Hypertension: She presents for follow up of hypertension.  She does check blood pressure  regularly  outside of the clinic. Outside blood pressures have been over 140/90. She follows a low salt diet.       Visit with patient and her  today in follow-up of blood pressure.  Numbers at home have actually been running pretty decently even without the medication.    Review of Systems   Constitutional, HEENT, cardiovascular, pulmonary, gi and gu systems are negative, except as otherwise noted.      Objective    Vitals - Patient Reported  Pain Score: No Pain (0)      Vitals:  No vitals were obtained today due to virtual visit.    Physical Exam   healthy, alert and no distress  PSYCH: Alert and oriented times 3; coherent speech, normal   rate and volume, able to articulate logical thoughts, able   to abstract reason, no tangential thoughts, no hallucinations   or delusions  Her affect is normal  RESP: No cough, no audible wheezing, able to talk in full sentences  Remainder of exam unable to be completed due to telephone visits    Past labs reviewed with the patient.             Phone call duration: 13 minutes

## 2023-03-01 ENCOUNTER — TELEPHONE (OUTPATIENT)
Dept: NEUROSURGERY | Facility: CLINIC | Age: 78
End: 2023-03-01
Payer: COMMERCIAL

## 2023-03-01 NOTE — TELEPHONE ENCOUNTER
LVM for patient that an xr prior to her clinic appointment tomorrow has been added at 9:20am. Writer provided address and recap of her clinic appointment afterwards as well.

## 2023-03-02 ENCOUNTER — ANCILLARY PROCEDURE (OUTPATIENT)
Dept: GENERAL RADIOLOGY | Facility: CLINIC | Age: 78
End: 2023-03-02
Attending: NEUROLOGICAL SURGERY
Payer: COMMERCIAL

## 2023-03-02 ENCOUNTER — OFFICE VISIT (OUTPATIENT)
Dept: NEUROSURGERY | Facility: CLINIC | Age: 78
End: 2023-03-02
Payer: COMMERCIAL

## 2023-03-02 VITALS — DIASTOLIC BLOOD PRESSURE: 83 MMHG | HEART RATE: 58 BPM | SYSTOLIC BLOOD PRESSURE: 149 MMHG | OXYGEN SATURATION: 97 %

## 2023-03-02 DIAGNOSIS — Z98.1 S/P CERVICAL SPINAL FUSION: Primary | ICD-10-CM

## 2023-03-02 DIAGNOSIS — Z98.890 S/P SPINAL SURGERY: ICD-10-CM

## 2023-03-02 PROCEDURE — 99024 POSTOP FOLLOW-UP VISIT: CPT | Performed by: NURSE PRACTITIONER

## 2023-03-02 PROCEDURE — 72040 X-RAY EXAM NECK SPINE 2-3 VW: CPT | Mod: TC | Performed by: RADIOLOGY

## 2023-03-02 ASSESSMENT — PAIN SCALES - GENERAL: PAINLEVEL: NO PAIN (0)

## 2023-03-02 NOTE — PROGRESS NOTES
[facility]  Follow Up Visit     HPI: 8 weeks s/p C3-5 ACDF with Dr. Franco. Patient reports feeling well. Pre op neck and right arm pain/weakness resolved. Denies any new/worsening symptoms. Denies any incision concerns. She is no longer taking pain medications. She has been following post op activity restrictions.     Exam:  Constitutional:  Alert, well nourished, NAD.  HEENT: Normocephalic, atraumatic.   Pulm:  Without shortness of breath   CV:  No pitting edema of BLE.     Vital Signs: BP (!) 149/83   Pulse 58   SpO2 97%     Neurological:  Awake  Alert  Oriented x 3  Motor exam:  Shoulder Abduction:  Right:  5/5    Left:  5/5  Biceps:                      Right:  5/5    Left:  5/5  Triceps:                     Right:  5/5    Left:  5/5  Wrist Extensors:       Right:  5/5    Left:  5/5  Wrist Flexors:           Right:  5/5    Left:  5/5  Intrinsics:                  Right:  5/5    Left:  5/5    Able to spontaneously move BUE  Sensation intact throughout BUE    Incision: Well healed     Imaging: AP and lateral views reveal stable and intact hardware     Assessment/Plan:    8 weeks s/p C3-5 ACDF    - Continue with routine post operative care plan   - May gradually increase activity and lifting restriction to 20 pounds, as tolerated  - Follow up at 3 months post op with xray prior   - Call our clinic for any incisional concerns, increased pain, new symptoms, or any other post operative questions or concerns    Discussed red flag symptoms and advised to seek medical attention if these develop. Patient voiced understanding and agreement.      Melissa Clay CNP  [facility]  [address] [address]  Tel [phone]  Pager [phone]

## 2023-03-02 NOTE — LETTER
3/2/2023         RE: Negin Jonas  [address]  [place] [address]        Dear Colleague,    Thank you for referring your patient, Negin Jonas, to the [facility]. Please see a copy of my visit note below.    [facility]  Follow Up Visit     HPI: 8 weeks s/p C3-5 ACDF with Dr. Franco. Patient reports feeling well. Pre op neck and right arm pain/weakness resolved. Denies any new/worsening symptoms. Denies any incision concerns. She is no longer taking pain medications. She has been following post op activity restrictions.     Exam:  Constitutional:  Alert, well nourished, NAD.  HEENT: Normocephalic, atraumatic.   Pulm:  Without shortness of breath   CV:  No pitting edema of BLE.     Vital Signs: BP (!) 149/83   Pulse 58   SpO2 97%     Neurological:  Awake  Alert  Oriented x 3  Motor exam:  Shoulder Abduction:  Right:  5/5    Left:  5/5  Biceps:                      Right:  5/5    Left:  5/5  Triceps:                     Right:  5/5    Left:  5/5  Wrist Extensors:       Right:  5/5    Left:  5/5  Wrist Flexors:           Right:  5/5    Left:  5/5  Intrinsics:                  Right:  5/5    Left:  5/5    Able to spontaneously move BUE  Sensation intact throughout BUE    Incision: Well healed     Imaging: AP and lateral views reveal stable and intact hardware     Assessment/Plan:    8 weeks s/p C3-5 ACDF    - Continue with routine post operative care plan   - May gradually increase activity and lifting restriction to 20 pounds, as tolerated  - Follow up at 3 months post op with xray prior   - Call our clinic for any incisional concerns, increased pain, new symptoms, or any other post operative questions or concerns    Discussed red flag symptoms and advised to seek medical attention if these develop. Patient voiced understanding and agreement.      Melissa Clay CNP  [facility]  [facility]  [address] [address]  Tel [phone]  Pager [phone]          Again, thank you for allowing me to participate in the care of your patient.        Sincerely,        Melissa Clay NP     Yes

## 2023-03-02 NOTE — NURSING NOTE
"Negin Jonas is a 77 year old female who presents for:  Chief Complaint   Patient presents with     RECHECK     Discuss results of xray        Initial Vitals:  BP (!) 172/74   Pulse 58   SpO2 97%  Estimated body mass index is 33.33 kg/m  as calculated from the following:    Height as of 1/6/23: 5' 5.28\" (1.658 m).    Weight as of 1/6/23: 202 lb (91.6 kg).. There is no height or weight on file to calculate BSA. BP completed using cuff size: regular  No Pain (0)    Nursing Comments:     Kari MCINTYRE Goodlund    "

## 2023-03-22 ENCOUNTER — OFFICE VISIT (OUTPATIENT)
Dept: FAMILY MEDICINE | Facility: CLINIC | Age: 78
End: 2023-03-22
Payer: COMMERCIAL

## 2023-03-22 VITALS
SYSTOLIC BLOOD PRESSURE: 112 MMHG | OXYGEN SATURATION: 98 % | HEART RATE: 80 BPM | WEIGHT: 175.2 LBS | BODY MASS INDEX: 29.91 KG/M2 | RESPIRATION RATE: 16 BRPM | TEMPERATURE: 97.8 F | HEIGHT: 64 IN | DIASTOLIC BLOOD PRESSURE: 71 MMHG

## 2023-03-22 DIAGNOSIS — D50.9 IRON DEFICIENCY ANEMIA, UNSPECIFIED IRON DEFICIENCY ANEMIA TYPE: ICD-10-CM

## 2023-03-22 DIAGNOSIS — Z13.220 SCREENING CHOLESTEROL LEVEL: ICD-10-CM

## 2023-03-22 DIAGNOSIS — Z12.11 COLON CANCER SCREENING: ICD-10-CM

## 2023-03-22 DIAGNOSIS — G40.909 SEIZURE DISORDER (H): ICD-10-CM

## 2023-03-22 DIAGNOSIS — J30.2 SEASONAL ALLERGIC RHINITIS, UNSPECIFIED TRIGGER: ICD-10-CM

## 2023-03-22 DIAGNOSIS — Z00.00 ENCOUNTER FOR MEDICARE ANNUAL WELLNESS EXAM: Primary | ICD-10-CM

## 2023-03-22 DIAGNOSIS — R41.3 SHORT-TERM MEMORY LOSS: ICD-10-CM

## 2023-03-22 DIAGNOSIS — F33.41 RECURRENT MAJOR DEPRESSIVE DISORDER, IN PARTIAL REMISSION (H): ICD-10-CM

## 2023-03-22 DIAGNOSIS — L40.9 PSORIASIS: ICD-10-CM

## 2023-03-22 LAB
CHOLEST SERPL-MCNC: 200 MG/DL
ERYTHROCYTE [DISTWIDTH] IN BLOOD BY AUTOMATED COUNT: 16.3 % (ref 10–15)
FASTING STATUS PATIENT QL REPORTED: YES
FERRITIN SERPL-MCNC: 32 NG/ML (ref 8–252)
HCT VFR BLD AUTO: 44.4 % (ref 35–47)
HDLC SERPL-MCNC: 82 MG/DL
HGB BLD-MCNC: 14.2 G/DL (ref 11.7–15.7)
LDLC SERPL CALC-MCNC: 98 MG/DL
MCH RBC QN AUTO: 27.7 PG (ref 26.5–33)
MCHC RBC AUTO-ENTMCNC: 32 G/DL (ref 31.5–36.5)
MCV RBC AUTO: 87 FL (ref 78–100)
NONHDLC SERPL-MCNC: 118 MG/DL
PLATELET # BLD AUTO: 252 10E3/UL (ref 150–450)
RBC # BLD AUTO: 5.12 10E6/UL (ref 3.8–5.2)
TRIGL SERPL-MCNC: 99 MG/DL
WBC # BLD AUTO: 6.2 10E3/UL (ref 4–11)

## 2023-03-22 PROCEDURE — 85027 COMPLETE CBC AUTOMATED: CPT | Performed by: FAMILY MEDICINE

## 2023-03-22 PROCEDURE — 80061 LIPID PANEL: CPT | Performed by: FAMILY MEDICINE

## 2023-03-22 PROCEDURE — G0439 PPPS, SUBSEQ VISIT: HCPCS | Performed by: FAMILY MEDICINE

## 2023-03-22 PROCEDURE — 36415 COLL VENOUS BLD VENIPUNCTURE: CPT | Performed by: FAMILY MEDICINE

## 2023-03-22 PROCEDURE — 82728 ASSAY OF FERRITIN: CPT | Performed by: FAMILY MEDICINE

## 2023-03-22 RX ORDER — FLUTICASONE PROPIONATE 50 MCG
1 SPRAY, SUSPENSION (ML) NASAL DAILY
Qty: 16 G | Refills: 3 | Status: SHIPPED | OUTPATIENT
Start: 2023-03-22 | End: 2024-04-08

## 2023-03-22 RX ORDER — FERROUS SULFATE 325(65) MG
1 TABLET ORAL DAILY
COMMUNITY

## 2023-03-22 RX ORDER — TRIAMCINOLONE ACETONIDE 1 MG/G
CREAM TOPICAL 2 TIMES DAILY PRN
Qty: 80 G | Refills: 1 | Status: SHIPPED | OUTPATIENT
Start: 2023-03-22

## 2023-03-22 RX ORDER — FLUTICASONE PROPIONATE 50 MCG
SPRAY, SUSPENSION (ML) NASAL
COMMUNITY
Start: 2021-12-08 | End: 2023-03-22

## 2023-03-22 ASSESSMENT — PAIN SCALES - GENERAL: PAINLEVEL: NO PAIN (0)

## 2023-03-22 NOTE — PROGRESS NOTES
SUBJECTIVE:   Negin is a 77 year old who presents for Preventive Visit.  No flowsheet data found.Patient has been advised of split billing requirements and indicates understanding: Yes  Are you in the first 12 months of your Medicare coverage?  No    Healthy Habits:    Taking medications regularly:  0    PHQ-2 Total Score:  History of Present Illness       Reason for visit:  Yearly visit    She eats 0-1 servings of fruits and vegetables daily.She consumes 0 sweetened beverage(s) daily.She exercises with enough effort to increase her heart rate 10 to 19 minutes per day.  She exercises with enough effort to increase her heart rate 3 or less days per week.   She is taking medications regularly.      Have you ever done Advance Care Planning? (For example, a Health Directive, POLST, or a discussion with a medical provider or your loved ones about your wishes): No, advance care planning information given to patient to review.  Patient plans to discuss their wishes with loved ones or provider.         Fall risk  Fallen 2 or more times in the past year?: No  Any fall with injury in the past year?: No    Cognitive Screening   1) Repeat 3 items (Leader, Season, Table)    2) Clock draw: NORMAL  3) 3 item recall: Recalls 1 object   Results: NORMAL clock, 1-2 items recalled: COGNITIVE IMPAIRMENT LESS LIKELY    Mini-CogTM Copyright S Meredith. Licensed by the author for use in [facility]; reprinted with permission (hipolito@.[facility]). All rights reserved.      Do you have sleep apnea, excessive snoring or daytime drowsiness?: no    Reviewed and updated as needed this visit by clinical staff   Tobacco  Allergies  Meds  Problems  Med Hx  Surg Hx  Fam Hx          Reviewed and updated as needed this visit by Provider   Tobacco  Allergies  Meds  Problems  Med Hx  Surg Hx  Fam Hx         Social History     Tobacco Use     Smoking status: Former     Packs/day: 1.00     Years: 25.00     Pack years: 25.00     Types:  Cigarettes     Passive exposure: Never     Smokeless tobacco: Never     Tobacco comments:     Quit 15 years ago.   Substance Use Topics     Alcohol use: No         Alcohol Use 3/22/2023   Prescreen: >3 drinks/day or >7 drinks/week? Not Applicable   No flowsheet data found.  Do you have a current opioid prescription? No  Do you use any other controlled substances or medications that are not prescribed by a provider? None        Current providers sharing in care for this patient include:   Patient Care Team:  Shanel Ramirez MD as PCP - General (Family Medicine)  Shanel Ramirez MD as Assigned PCP  Care, [facility] ([facility] ([facility] ([place]  Hamilton Martinez MD as Assigned Musculoskeletal Provider  Rick Franco MD as Assigned Neuroscience Provider    The following health maintenance items are reviewed in Epic and correct as of today:  Health Maintenance   Topic Date Due     LUNG CANCER SCREENING  08/12/2005     ZOSTER IMMUNIZATION (2 of 3) 06/06/2014     COLORECTAL CANCER SCREENING  03/25/2020     COVID-19 Vaccine (3 - Booster for Handy series) 12/24/2021     MAMMO SCREENING  01/08/2022     PHQ-9  06/28/2023     ANNUAL REVIEW OF HM ORDERS  01/27/2024     MEDICARE ANNUAL WELLNESS VISIT  03/22/2024     FALL RISK ASSESSMENT  03/22/2024     LIPID  01/06/2025     DEXA  01/08/2025     DTAP/TDAP/TD IMMUNIZATION (4 - Td or Tdap) 02/15/2027     ADVANCE CARE PLANNING  03/22/2028     HEPATITIS C SCREENING  Completed     DEPRESSION ACTION PLAN  Completed     INFLUENZA VACCINE  Completed     Pneumococcal Vaccine: 65+ Years  Completed     IPV IMMUNIZATION  Aged Out     MENINGITIS IMMUNIZATION  Aged Out     Lab work is in process  Labs reviewed in EPIC  BP Readings from Last 3 Encounters:   03/22/23 112/71   03/02/23 (!) 149/83   01/07/23 125/68    Wt Readings from Last 3 Encounters:   03/22/23 79.5 kg (175 lb 3.2 oz)   01/06/23 91.6 kg (202 lb)   12/28/22 93.6 kg (206 lb 6.4 oz)     "        Here today with  for routine wellness exam.  A little over 2 months status post neck surgery and doing much better.  Doing well.  Reports no interval health concerns.        Pertinent mammograms are reviewed under the imaging tab.    Review of Systems  CONSTITUTIONAL: NEGATIVE for fever, chills, change in weight  INTEGUMENTARY/SKIN: NEGATIVE for worrisome rashes, moles or lesions  EYES: NEGATIVE for vision changes or irritation  ENT/MOUTH: NEGATIVE for ear, mouth and throat problems  RESP: NEGATIVE for significant cough or SOB  BREAST: NEGATIVE for masses, tenderness or discharge  CV: NEGATIVE for chest pain, palpitations or peripheral edema  GI: NEGATIVE for nausea, abdominal pain, heartburn, or change in bowel habits  : NEGATIVE for frequency, dysuria, or hematuria  MUSCULOSKELETAL: NEGATIVE for significant arthralgias or myalgia  NEURO: NEGATIVE for weakness, dizziness or paresthesias  ENDOCRINE: NEGATIVE for temperature intolerance, skin/hair changes  HEME: NEGATIVE for bleeding problems  PSYCHIATRIC: NEGATIVE for changes in mood or affect    OBJECTIVE:   /71   Pulse 80   Temp 97.8  F (36.6  C) (Oral)   Resp 16   Ht 1.63 m (5' 4.17\")   Wt 79.5 kg (175 lb 3.2 oz)   SpO2 98%   BMI 29.91 kg/m   Estimated body mass index is 29.91 kg/m  as calculated from the following:    Height as of this encounter: 1.63 m (5' 4.17\").    Weight as of this encounter: 79.5 kg (175 lb 3.2 oz).  Physical Exam  GENERAL APPEARANCE: healthy, alert and no distress  EYES: Eyes grossly normal to inspection, PERRL and conjunctivae and sclerae normal  HENT: ear canals and TM's normal, nose and mouth without ulcers or lesions, oropharynx clear and oral mucous membranes moist  NECK: no adenopathy, no asymmetry, masses, or scars and thyroid normal to palpation  RESP: lungs clear to auscultation - no rales, rhonchi or wheezes  CV: regular rate and rhythm, normal S1 S2, no S3 or S4, no murmur, click or rub, no " peripheral edema and peripheral pulses strong  ABDOMEN: soft, nontender, no hepatosplenomegaly, no masses and bowel sounds normal  MS: no musculoskeletal defects are noted and gait is age appropriate without ataxia  SKIN: no suspicious lesions or rashes  NEURO: Normal strength and tone, sensory exam grossly normal, mentation intact and speech normal  PSYCH: mentation appears normal and affect normal/bright    Diagnostic Test Results:  Labs reviewed in Epic    ASSESSMENT / PLAN:   (Z00.00) Encounter for Medicare annual wellness exam  (primary encounter diagnosis)  Comment: Routine health maintenance otherwise up-to-date  Plan:     (D50.9) Iron deficiency anemia, unspecified iron deficiency anemia type  Comment: Recheck and adjust as needed  Plan: CBC with platelets, Ferritin            (G40.909) Seizure disorder (H)  Comment: Stable and up-to-date and following with her neurologist  Plan:     (F33.41) Recurrent major depressive disorder, in partial remission (H)  Comment: Actually off therapy and most recent PHQ score was 5.  We will continue to follow  Plan:     (R41.3) Short-term memory loss  Comment: Does not seem to be a progressive issue and we will continue to follow  Plan:     (L40.9) Psoriasis  Comment:   Plan: triamcinolone (KENALOG) 0.1 % external cream            (J30.2) Seasonal allergic rhinitis, unspecified trigger  Comment:   Plan: fluticasone (FLONASE) 50 MCG/ACT nasal spray            (Z13.220) Screening cholesterol level  Comment:   Plan: Lipid panel reflex to direct LDL Non-fasting            (Z12.11) Colon cancer screening  Comment:   Plan: Fecal colorectal cancer screen (FIT)              Patient has been advised of split billing requirements and indicates understanding: Yes      COUNSELING:  Reviewed preventive health counseling, as reflected in patient instructions       Regular exercise       Healthy diet/nutrition       Vision screening       Hearing screening       Dental care        She  reports that she has quit smoking. Her smoking use included cigarettes. She has a 25.00 pack-year smoking history. She has never been exposed to tobacco smoke. She has never used smokeless tobacco.      Appropriate preventive services were discussed with this patient, including applicable screening as appropriate for cardiovascular disease, diabetes, osteopenia/osteoporosis, and glaucoma.  As appropriate for age/gender, discussed screening for colorectal cancer, prostate cancer, breast cancer, and cervical cancer. Checklist reviewing preventive services available has been given to the patient.    Reviewed patients plan of care and provided an AVS. The Basic Care Plan (routine screening as documented in Health Maintenance) for Negin meets the Care Plan requirement. This Care Plan has been established and reviewed with the Patient.          Shanel Ramirez MD  [facility]    Identified Health Risks:    I have reviewed Opioid Use Disorder and Substance Use Disorder risk factors and made any needed referrals.

## 2023-03-22 NOTE — PATIENT INSTRUCTIONS
Neck    - May gradually increase activity and lifting restriction to 20 pounds, as tolerated  - Follow up at 3 months post op with xray prior     Seizure / neurology    She will continue on Lamictal. I will plan to see her back in a year. Family will call if any questions or problems arise.        Patient Education   Personalized Prevention Plan  You are due for the preventive services outlined below.  Your care team is available to assist you in scheduling these services.  If you have already completed any of these items, please share that information with your care team to update in your medical record.  Health Maintenance Due   Topic Date Due    LUNG CANCER SCREENING  08/12/2005    Zoster (Shingles) Vaccine (2 of 3) 06/06/2014    Colorectal Cancer Screening  03/25/2020    COVID-19 Vaccine (3 - Booster for Handy series) 12/24/2021    Mammogram  01/08/2022

## 2023-03-28 ENCOUNTER — APPOINTMENT (OUTPATIENT)
Dept: LAB | Facility: CLINIC | Age: 78
End: 2023-03-28
Payer: COMMERCIAL

## 2023-03-28 LAB — HEMOCCULT STL QL IA: NEGATIVE

## 2023-03-28 PROCEDURE — 82274 ASSAY TEST FOR BLOOD FECAL: CPT | Performed by: FAMILY MEDICINE

## 2023-03-29 NOTE — RESULT ENCOUNTER NOTE
Negin,  Your stool test was normal - no evidence of blood.  This is a screening test for colon cancer - and though this is not as thorough as a full colonoscopy it has been found to be accurate in detecting concerning colon lesions.  We should repeat this annually.   RUY Ramirez M.D.

## 2023-04-05 ENCOUNTER — ANCILLARY PROCEDURE (OUTPATIENT)
Dept: GENERAL RADIOLOGY | Facility: CLINIC | Age: 78
End: 2023-04-05
Attending: NEUROLOGICAL SURGERY
Payer: COMMERCIAL

## 2023-04-05 DIAGNOSIS — Z98.890 S/P SPINAL SURGERY: ICD-10-CM

## 2023-04-05 PROCEDURE — 72040 X-RAY EXAM NECK SPINE 2-3 VW: CPT | Mod: TC | Performed by: RADIOLOGY

## 2023-04-06 ENCOUNTER — OFFICE VISIT (OUTPATIENT)
Dept: NEUROSURGERY | Facility: CLINIC | Age: 78
End: 2023-04-06
Payer: COMMERCIAL

## 2023-04-06 VITALS
BODY MASS INDEX: 28.16 KG/M2 | SYSTOLIC BLOOD PRESSURE: 179 MMHG | HEIGHT: 66 IN | OXYGEN SATURATION: 98 % | WEIGHT: 175.2 LBS | DIASTOLIC BLOOD PRESSURE: 78 MMHG | HEART RATE: 65 BPM

## 2023-04-06 DIAGNOSIS — Z98.1 S/P CERVICAL SPINAL FUSION: Primary | ICD-10-CM

## 2023-04-06 PROCEDURE — 99024 POSTOP FOLLOW-UP VISIT: CPT | Performed by: NEUROLOGICAL SURGERY

## 2023-04-06 ASSESSMENT — PAIN SCALES - GENERAL: PAINLEVEL: NO PAIN (0)

## 2023-04-06 NOTE — PROGRESS NOTES
Message sent to scheduling to call and schedule routing post-op in 3 months with x-ray prior. (6 month post-op)    Liz Rivera RN on 4/6/2023 at 11:47 AM

## 2023-04-06 NOTE — LETTER
4/6/2023         RE: Negin Jonas  [address]  [place] [address]        Dear Colleague,    Thank you for referring your patient, Negin Jonas, to the [facility]. Please see a copy of my visit note below.    Message sent to scheduling to call and schedule routing post-op in 3 months with x-ray prior. (6 month post-op)    Liz Rivera RN on 4/6/2023 at 11:47 AM      Doing well 3 mos post-op.  Pain and tremors resolved.  No symptoms currently.    Past Medical History:   Diagnosis Date     Advanced directives, counseling/discussion 3/7/2012     Anxiety attack 2006    EEG normal, improved with lamictal     Chronic rhinitis 1/31/2011     Closed head injury 2004    subarachnoid hemorrage/cerebral contusion with sequellae of significant diminished short term memory, word finding difficulties and emotional lability, was in coma for 2 months, on seizure prophylaxsis with lamictal, f/b neuro annually     Generalized anxiety disorder      Major depression      Major depression in complete remission (H) 1/31/2011    was seen by psychiatrist in past, has tried Effexor and Seroquel and Hiram's Wart     Migraine headache     resolved s/p closed head injury     Panic attacks      Psoriasis 1/31/2011     Short-term memory loss 2004    secondary to closed head injury     Subarachnoid hemorrhage following injury with loss of consciousness and death due to other cause prior to regaining consciousness, initial encounter 6/1/2004     Past Surgical History:   Procedure Laterality Date     ARTHROPLASTY KNEE Right 03/10/2021    Procedure: ARTHROPLASTY, KNEE, TOTAL;  Surgeon: Hamilton Martinez MD;  Location: [facility]     DISCECTOMY, FUSION CERVICAL ANTERIOR TWO LEVELS, COMBINED N/A 1/6/2023    Procedure: Cervical 3 to Cervical 5 Anterior Cervical Discectomy Fusion   ;  Surgeon: Rick Franco MD;  Location:  OR     ESOPHAGOSCOPY, GASTROSCOPY, DUODENOSCOPY (EGD),  COMBINED N/A 12/17/2015    Procedure: COMBINED ESOPHAGOSCOPY, GASTROSCOPY, DUODENOSCOPY (EGD);  Surgeon: Duane, William Charles, MD;  Location: [facility]     ESOPHAGOSCOPY, GASTROSCOPY, DUODENOSCOPY (EGD), COMBINED N/A 12/17/2015    Procedure: COMBINED ESOPHAGOSCOPY, GASTROSCOPY, DUODENOSCOPY (EGD), BIOPSY SINGLE OR MULTIPLE;  Surgeon: Duane, William Charles, MD;  Location: [facility] OR     EYE SURGERY Bilateral 11/2022    cataract     HC REMOVAL GALLBLADDER       HYSTERECTOMY, PAP NO LONGER INDICATED  1992    with ovaries     IR MISCELLANEOUS PROCEDURE  08/21/2004     RELEASE CARPAL TUNNEL BILATERAL       ZZC TOTAL KNEE ARTHROPLASTY Left 06/10/2020    DML     Social History     Socioeconomic History     Marital status:      Spouse name: Not on file     Number of children: Not on file     Years of education: Not on file     Highest education level: Not on file   Occupational History     Not on file   Tobacco Use     Smoking status: Former     Packs/day: 1.00     Years: 25.00     Pack years: 25.00     Types: Cigarettes     Passive exposure: Never     Smokeless tobacco: Never     Tobacco comments:     Quit 15 years ago.   Vaping Use     Vaping status: Never Used   Substance and Sexual Activity     Alcohol use: No     Drug use: No     Sexual activity: Never   Other Topics Concern     Parent/sibling w/ CABG, MI or angioplasty before 65F 55M? Not Asked   Social History Narrative     Not on file     Social Determinants of Health     Financial Resource Strain: Not on file   Food Insecurity: Not on file   Transportation Needs: Not on file   Physical Activity: Not on file   Stress: Not on file   Social Connections: Not on file   Intimate Partner Violence: Not on file   Housing Stability: Not on file     Family History   Problem Relation Age of Onset     C.A.D. Father 70        CABG x 3      Respiratory Father         emphysema     Psychotic Disorder Father         depression     Diabetes Mother      Psychotic Disorder Mother      "    depression     Diabetes Maternal Grandfather      Hypertension Sister      Anxiety Disorder Sister      Respiratory Sister         emphysema        ROS: 10 point ROS neg other than the symptoms noted above in the HPI.    Physical Exam  BP (!) 179/78   Pulse 65   Ht 1.664 m (5' 5.5\")   Wt 79.5 kg (175 lb 3.2 oz)   SpO2 98%   BMI 28.71 kg/m    HEENT:  Normocephalic, atraumatic.  PERRLA.  EOM s intact.  Visual fields full to gross exam  Neck:  Supple, non-tender, without lymphadenopathy.  Heart:  No peripheral edema  Lungs:  No SOB  Abdomen:  Non-distended.   Skin:  Warm and dry.  Extremities:  No edema, cyanosis or clubbing.  Psychiatric:  No apparent distress  Musculoskeletal:  Normal bulk and tone    NEUROLOGICAL EXAMINATION:     Mental status:  Alert and Oriented x 3, speech is fluent.  Cranial nerves:  II-XII intact.   Motor:    Shoulder Abduction:  Right:  5/5   Left:  5/5  Biceps:                      Right:  5/5   Left:  5/5  Triceps:                     Right:  5/5   Left:  5/5  Wrist Extensors:       Right:  5/5   Left:  5/5  Wrist Flexors:           Right:  5/5   Left:  5/5  interosseus :            Right:  5/5   Left:  5/5  Hip Flexor:                Right: 5/5  Left:  5/5  Quadriceps:             Right:  5/5  Left:  5/5  Hamstrings:             Right:  5/5  Left:  5/5  Gastroc Soleus:        Right:  5/5  Left:  5/5  Tib/Ant:                      Right:  5/5  Left:  5/5  EHL:                     Right:  5/5  Left:  5/5  Sensation:  Intact  Reflexes:  Negative Babinski.  Negative Clonus.  Negative Hill's.  Coordination:  Smooth finger to nose testing.   Negative pronator drift.  Smooth tandem walking.  Incision well healed    A/P:  XRs stable  Continue routine follow up        Again, thank you for allowing me to participate in the care of your patient.        Sincerely,        Rick Franco MD    "

## 2023-04-06 NOTE — PROGRESS NOTES
Doing well 3 mos post-op.  Pain and tremors resolved.  No symptoms currently.    Past Medical History:   Diagnosis Date     Advanced directives, counseling/discussion 3/7/2012     Anxiety attack 2006    EEG normal, improved with lamictal     Chronic rhinitis 1/31/2011     Closed head injury 2004    subarachnoid hemorrage/cerebral contusion with sequellae of significant diminished short term memory, word finding difficulties and emotional lability, was in coma for 2 months, on seizure prophylaxsis with lamictal, f/b neuro annually     Generalized anxiety disorder      Major depression      Major depression in complete remission (H) 1/31/2011    was seen by psychiatrist in past, has tried Effexor and Seroquel and Hiram's Wart     Migraine headache     resolved s/p closed head injury     Panic attacks      Psoriasis 1/31/2011     Short-term memory loss 2004    secondary to closed head injury     Subarachnoid hemorrhage following injury with loss of consciousness and death due to other cause prior to regaining consciousness, initial encounter 6/1/2004     Past Surgical History:   Procedure Laterality Date     ARTHROPLASTY KNEE Right 03/10/2021    Procedure: ARTHROPLASTY, KNEE, TOTAL;  Surgeon: Hamilton Martinez MD;  Location:  OR     DISCECTOMY, FUSION CERVICAL ANTERIOR TWO LEVELS, COMBINED N/A 1/6/2023    Procedure: Cervical 3 to Cervical 5 Anterior Cervical Discectomy Fusion   ;  Surgeon: Rick Franco MD;  Location:  OR     ESOPHAGOSCOPY, GASTROSCOPY, DUODENOSCOPY (EGD), COMBINED N/A 12/17/2015    Procedure: COMBINED ESOPHAGOSCOPY, GASTROSCOPY, DUODENOSCOPY (EGD);  Surgeon: Duane, William Charles, MD;  Location:  OR     ESOPHAGOSCOPY, GASTROSCOPY, DUODENOSCOPY (EGD), COMBINED N/A 12/17/2015    Procedure: COMBINED ESOPHAGOSCOPY, GASTROSCOPY, DUODENOSCOPY (EGD), BIOPSY SINGLE OR MULTIPLE;  Surgeon: Duane, William Charles, MD;  Location:  OR     EYE SURGERY Bilateral 11/2022    cataract     HC REMOVAL  "GALLBLADDER       HYSTERECTOMY, PAP NO LONGER INDICATED  1992    with ovaries     IR MISCELLANEOUS PROCEDURE  08/21/2004     RELEASE CARPAL TUNNEL BILATERAL       ZZC TOTAL KNEE ARTHROPLASTY Left 06/10/2020    DML     Social History     Socioeconomic History     Marital status:      Spouse name: Not on file     Number of children: Not on file     Years of education: Not on file     Highest education level: Not on file   Occupational History     Not on file   Tobacco Use     Smoking status: Former     Packs/day: 1.00     Years: 25.00     Pack years: 25.00     Types: Cigarettes     Passive exposure: Never     Smokeless tobacco: Never     Tobacco comments:     Quit 15 years ago.   Vaping Use     Vaping status: Never Used   Substance and Sexual Activity     Alcohol use: No     Drug use: No     Sexual activity: Never   Other Topics Concern     Parent/sibling w/ CABG, MI or angioplasty before 65F 55M? Not Asked   Social History Narrative     Not on file     Social Determinants of Health     Financial Resource Strain: Not on file   Food Insecurity: Not on file   Transportation Needs: Not on file   Physical Activity: Not on file   Stress: Not on file   Social Connections: Not on file   Intimate Partner Violence: Not on file   Housing Stability: Not on file     Family History   Problem Relation Age of Onset     C.A.D. Father 70        CABG x 3      Respiratory Father         emphysema     Psychotic Disorder Father         depression     Diabetes Mother      Psychotic Disorder Mother         depression     Diabetes Maternal Grandfather      Hypertension Sister      Anxiety Disorder Sister      Respiratory Sister         emphysema        ROS: 10 point ROS neg other than the symptoms noted above in the HPI.    Physical Exam  BP (!) 179/78   Pulse 65   Ht 1.664 m (5' 5.5\")   Wt 79.5 kg (175 lb 3.2 oz)   SpO2 98%   BMI 28.71 kg/m    HEENT:  Normocephalic, atraumatic.  PERRLA.  EOM s intact.  Visual fields full to " gross exam  Neck:  Supple, non-tender, without lymphadenopathy.  Heart:  No peripheral edema  Lungs:  No SOB  Abdomen:  Non-distended.   Skin:  Warm and dry.  Extremities:  No edema, cyanosis or clubbing.  Psychiatric:  No apparent distress  Musculoskeletal:  Normal bulk and tone    NEUROLOGICAL EXAMINATION:     Mental status:  Alert and Oriented x 3, speech is fluent.  Cranial nerves:  II-XII intact.   Motor:    Shoulder Abduction:  Right:  5/5   Left:  5/5  Biceps:                      Right:  5/5   Left:  5/5  Triceps:                     Right:  5/5   Left:  5/5  Wrist Extensors:       Right:  5/5   Left:  5/5  Wrist Flexors:           Right:  5/5   Left:  5/5  interosseus :            Right:  5/5   Left:  5/5  Hip Flexor:                Right: 5/5  Left:  5/5  Quadriceps:             Right:  5/5  Left:  5/5  Hamstrings:             Right:  5/5  Left:  5/5  Gastroc Soleus:        Right:  5/5  Left:  5/5  Tib/Ant:                      Right:  5/5  Left:  5/5  EHL:                     Right:  5/5  Left:  5/5  Sensation:  Intact  Reflexes:  Negative Babinski.  Negative Clonus.  Negative Hill's.  Coordination:  Smooth finger to nose testing.   Negative pronator drift.  Smooth tandem walking.  Incision well healed    A/P:  XRs stable  Continue routine follow up

## 2023-04-06 NOTE — NURSING NOTE
"Negin Jonas is a 77 year old female who presents for:  Chief Complaint   Patient presents with     RECHECK     Cervical 3 to Cervical 5 Anterior Cervical Discectomy Fusion        Initial Vitals:  BP (!) 179/78   Pulse 65   Ht 5' 5.5\" (1.664 m)   Wt 175 lb 3.2 oz (79.5 kg)   SpO2 98%   BMI 28.71 kg/m   Estimated body mass index is 28.71 kg/m  as calculated from the following:    Height as of this encounter: 5' 5.5\" (1.664 m).    Weight as of this encounter: 175 lb 3.2 oz (79.5 kg).. Body surface area is 1.92 meters squared. BP completed using cuff size: small regular  No Pain (0)        Celine Mendoza  "

## 2023-05-31 ENCOUNTER — OFFICE VISIT (OUTPATIENT)
Dept: FAMILY MEDICINE | Facility: CLINIC | Age: 78
End: 2023-05-31
Payer: COMMERCIAL

## 2023-05-31 ENCOUNTER — ANCILLARY PROCEDURE (OUTPATIENT)
Dept: GENERAL RADIOLOGY | Facility: CLINIC | Age: 78
End: 2023-05-31
Attending: INTERNAL MEDICINE
Payer: COMMERCIAL

## 2023-05-31 VITALS
TEMPERATURE: 98.6 F | WEIGHT: 170 LBS | HEIGHT: 65 IN | BODY MASS INDEX: 28.32 KG/M2 | DIASTOLIC BLOOD PRESSURE: 75 MMHG | OXYGEN SATURATION: 96 % | HEART RATE: 86 BPM | SYSTOLIC BLOOD PRESSURE: 154 MMHG

## 2023-05-31 DIAGNOSIS — R20.2 NUMBNESS AND TINGLING IN RIGHT HAND: ICD-10-CM

## 2023-05-31 DIAGNOSIS — R20.0 NUMBNESS AND TINGLING IN RIGHT HAND: ICD-10-CM

## 2023-05-31 DIAGNOSIS — M79.644 PAIN OF FINGER OF RIGHT HAND: ICD-10-CM

## 2023-05-31 DIAGNOSIS — M79.644 PAIN OF FINGER OF RIGHT HAND: Primary | ICD-10-CM

## 2023-05-31 PROCEDURE — 99214 OFFICE O/P EST MOD 30 MIN: CPT | Performed by: INTERNAL MEDICINE

## 2023-05-31 PROCEDURE — 73130 X-RAY EXAM OF HAND: CPT | Mod: TC | Performed by: RADIOLOGY

## 2023-05-31 ASSESSMENT — PAIN SCALES - GENERAL: PAINLEVEL: EXTREME PAIN (8)

## 2023-05-31 NOTE — PROGRESS NOTES
"  Assessment & Plan     1.  Pain of the fingers and wrist of the right hand.  X-ray hand performed today reviewed.  I feel there is a minimal arthritic changes of the wrist but otherwise looked okay to me.  Final report pending.  2.  Numbness and tingling of the right hand mostly involving the ring and little finger.    I informed the patient the differential diagnoses include arthritis particular of the wrist.  Ulnar or carpal tunnel syndrome is another possibility though her Tinel sign was negative.  But I think we should proceed with EMG nerve conduction study of the right upper extremity.  If that turns out to be negative then I will refer her to orthopedics department.  Nonsteroidal anti-inflammatory medication that she has taken thus far has not helped.     BMI:   Estimated body mass index is 28.32 kg/m  as calculated from the following:    Height as of this encounter: 1.65 m (5' 4.96\").    Weight as of this encounter: 77.1 kg (170 lb).         Edmund Cardoza MD  [facility]   Negin is a 78 year old, presenting for the following health issues:  Musculoskeletal Problem (Right hand pain for 4 months)        5/31/2023     1:13 PM   Additional Questions   Roomed by Crystal   Accompanied by self         5/31/2023     1:13 PM   Patient Reported Additional Medications   Patient reports taking the following new medications none     Musculoskeletal Problem    History of Present Illness       Reason for visit:  Hand pain    She eats 2-3 servings of fruits and vegetables daily.She consumes 0 sweetened beverage(s) daily.She exercises with enough effort to increase her heart rate 9 or less minutes per day.  She exercises with enough effort to increase her heart rate 3 or less days per week.   She is taking medications regularly.       RIGHT HAND PAIN FOR 4 MONTHS ACHE SHARP AND DULL WITHOUT INJURY    78-year-old lady comes in complaining of pain involving the right hand and also " tingling numbness particularly of the right little and ring finger.  The pain is constant day and night.  She at times has difficulty sleeping at night.  Symptoms started more than 4 months ago.  There is no history of trauma.  No history of gout.    Review of Systems   Constitutional, HEENT, cardiovascular, pulmonary, GI, , musculoskeletal, neuro, skin, endocrine and psych systems are negative, except as otherwise noted.      Objective    There were no vitals taken for this visit.  There is no height or weight on file to calculate BMI.  Physical Exam   GENERAL: healthy, alert and no distress  NECK: no adenopathy, no asymmetry, masses, or scars and thyroid normal to palpation  MS: Right hand examination reveals good range of motion of the DIP, PIP and MCP joints of the fingers.  There is tenderness on palpation of the wrist and range of motion in all direction of the wrist is associated with pain and discomfort.  Tinel sign is negative.  There is no swelling.  Muscle wasting of the hand is not noted to hand grasp on the right is poor compared to the left.    X-ray of the right hand performed and I reviewed the x-ray.  The fingers look fine.  There is some sclerosing of the wrist joint so she may have some arthritis there.  I will await the final report from radiologist.

## 2023-07-10 ENCOUNTER — TELEPHONE (OUTPATIENT)
Dept: NEUROSURGERY | Facility: CLINIC | Age: 78
End: 2023-07-10
Payer: COMMERCIAL

## 2023-07-11 ENCOUNTER — TELEPHONE (OUTPATIENT)
Dept: NEUROSURGERY | Facility: CLINIC | Age: 78
End: 2023-07-11
Payer: COMMERCIAL

## 2023-07-11 NOTE — TELEPHONE ENCOUNTER
LVM for patient to call back to schedule 3 month follow up visit from surgery with XR prior.   
hard copy, drawn during this pregnancy

## 2023-07-20 ENCOUNTER — TELEPHONE (OUTPATIENT)
Dept: NEUROSURGERY | Facility: CLINIC | Age: 78
End: 2023-07-20

## 2023-07-20 ENCOUNTER — OFFICE VISIT (OUTPATIENT)
Dept: NEUROSURGERY | Facility: CLINIC | Age: 78
End: 2023-07-20
Payer: COMMERCIAL

## 2023-07-20 ENCOUNTER — ANCILLARY PROCEDURE (OUTPATIENT)
Dept: GENERAL RADIOLOGY | Facility: CLINIC | Age: 78
End: 2023-07-20
Attending: NEUROLOGICAL SURGERY
Payer: COMMERCIAL

## 2023-07-20 VITALS — DIASTOLIC BLOOD PRESSURE: 90 MMHG | HEART RATE: 57 BPM | OXYGEN SATURATION: 96 % | SYSTOLIC BLOOD PRESSURE: 168 MMHG

## 2023-07-20 DIAGNOSIS — Z98.1 S/P CERVICAL SPINAL FUSION: ICD-10-CM

## 2023-07-20 DIAGNOSIS — Z98.1 S/P CERVICAL SPINAL FUSION: Primary | ICD-10-CM

## 2023-07-20 PROCEDURE — 72040 X-RAY EXAM NECK SPINE 2-3 VW: CPT | Mod: TC | Performed by: RADIOLOGY

## 2023-07-20 PROCEDURE — 99213 OFFICE O/P EST LOW 20 MIN: CPT | Performed by: NEUROLOGICAL SURGERY

## 2023-07-20 ASSESSMENT — PAIN SCALES - GENERAL: PAINLEVEL: NO PAIN (0)

## 2023-07-20 NOTE — PROGRESS NOTES
Doing well 6 mos post-op.  Pain and tremors resolved.  No symptoms currently.    Past Medical History:   Diagnosis Date     Advanced directives, counseling/discussion 3/7/2012     Anxiety attack 2006    EEG normal, improved with lamictal     Chronic rhinitis 1/31/2011     Closed head injury 2004    subarachnoid hemorrage/cerebral contusion with sequellae of significant diminished short term memory, word finding difficulties and emotional lability, was in coma for 2 months, on seizure prophylaxsis with lamictal, f/b neuro annually     Generalized anxiety disorder      Major depression      Major depression in complete remission (H) 1/31/2011    was seen by psychiatrist in past, has tried Effexor and Seroquel and Hiram's Wart     Migraine headache     resolved s/p closed head injury     Panic attacks      Psoriasis 1/31/2011     Short-term memory loss 2004    secondary to closed head injury     Subarachnoid hemorrhage following injury with loss of consciousness and death due to other cause prior to regaining consciousness, initial encounter 6/1/2004     Past Surgical History:   Procedure Laterality Date     ARTHROPLASTY KNEE Right 03/10/2021    Procedure: ARTHROPLASTY, KNEE, TOTAL;  Surgeon: Hamilton Martinez MD;  Location:  OR     DISCECTOMY, FUSION CERVICAL ANTERIOR TWO LEVELS, COMBINED N/A 1/6/2023    Procedure: Cervical 3 to Cervical 5 Anterior Cervical Discectomy Fusion   ;  Surgeon: Rick Franco MD;  Location:  OR     ESOPHAGOSCOPY, GASTROSCOPY, DUODENOSCOPY (EGD), COMBINED N/A 12/17/2015    Procedure: COMBINED ESOPHAGOSCOPY, GASTROSCOPY, DUODENOSCOPY (EGD);  Surgeon: Duane, William Charles, MD;  Location:  OR     ESOPHAGOSCOPY, GASTROSCOPY, DUODENOSCOPY (EGD), COMBINED N/A 12/17/2015    Procedure: COMBINED ESOPHAGOSCOPY, GASTROSCOPY, DUODENOSCOPY (EGD), BIOPSY SINGLE OR MULTIPLE;  Surgeon: Duane, William Charles, MD;  Location:  OR     EYE SURGERY Bilateral 11/2022    cataract     HC REMOVAL  GALLBLADDER       HYSTERECTOMY, PAP NO LONGER INDICATED  1992    with ovaries     IR MISCELLANEOUS PROCEDURE  08/21/2004     RELEASE CARPAL TUNNEL BILATERAL       ZZC TOTAL KNEE ARTHROPLASTY Left 06/10/2020    DML     Social History     Socioeconomic History     Marital status:      Spouse name: Not on file     Number of children: Not on file     Years of education: Not on file     Highest education level: Not on file   Occupational History     Not on file   Tobacco Use     Smoking status: Former     Packs/day: 1.00     Years: 25.00     Pack years: 25.00     Types: Cigarettes     Passive exposure: Never     Smokeless tobacco: Never     Tobacco comments:     Quit 15 years ago.   Vaping Use     Vaping Use: Never used   Substance and Sexual Activity     Alcohol use: No     Drug use: No     Sexual activity: Never   Other Topics Concern     Parent/sibling w/ CABG, MI or angioplasty before 65F 55M? Not Asked   Social History Narrative     Not on file     Social Determinants of Health     Financial Resource Strain: Not on file   Food Insecurity: Not on file   Transportation Needs: Not on file   Physical Activity: Not on file   Stress: Not on file   Social Connections: Not on file   Intimate Partner Violence: Not on file   Housing Stability: Not on file     Family History   Problem Relation Age of Onset     C.A.D. Father 70        CABG x 3      Respiratory Father         emphysema     Psychotic Disorder Father         depression     Diabetes Mother      Psychotic Disorder Mother         depression     Diabetes Maternal Grandfather      Hypertension Sister      Anxiety Disorder Sister      Respiratory Sister         emphysema        ROS: 10 point ROS neg other than the symptoms noted above in the HPI.    Physical Exam  BP (!) 168/90   Pulse 57   SpO2 96%   HEENT:  Normocephalic, atraumatic.  PERRLA.  EOM s intact.  Visual fields full to gross exam  Neck:  Supple, non-tender, without lymphadenopathy.  Heart:  No  peripheral edema  Lungs:  No SOB  Abdomen:  Non-distended.   Skin:  Warm and dry.  Extremities:  No edema, cyanosis or clubbing.  Psychiatric:  No apparent distress  Musculoskeletal:  Normal bulk and tone    NEUROLOGICAL EXAMINATION:     Mental status:  Alert and Oriented x 3, speech is fluent.  Cranial nerves:  II-XII intact.   Motor:    Shoulder Abduction:  Right:  5/5   Left:  5/5  Biceps:                      Right:  5/5   Left:  5/5  Triceps:                     Right:  5/5   Left:  5/5  Wrist Extensors:       Right:  5/5   Left:  5/5  Wrist Flexors:           Right:  5/5   Left:  5/5  interosseus :            Right:  5/5   Left:  5/5  Hip Flexor:                Right: 5/5  Left:  5/5  Quadriceps:             Right:  5/5  Left:  5/5  Hamstrings:             Right:  5/5  Left:  5/5  Gastroc Soleus:        Right:  5/5  Left:  5/5  Tib/Ant:                      Right:  5/5  Left:  5/5  EHL:                     Right:  5/5  Left:  5/5  Sensation:  Intact  Reflexes:  Negative Babinski.  Negative Clonus.  Negative Hill's.  Coordination:  Smooth finger to nose testing.   Negative pronator drift.  Smooth tandem walking.  Incision well healed    A/P:  XRs stable  Continue routine follow up

## 2023-07-20 NOTE — PROGRESS NOTES
Routine follow up with BESSIE with x-ray prior in 6 months. Message sent to scheduling. Writer did not see patient after today's visit.    Liz Rivera RN on 7/20/2023 at 10:08 AM

## 2023-07-20 NOTE — TELEPHONE ENCOUNTER
LM- patient is to schedule a follow post surgical appointment January 2024 with imaging prior.  The appointment is to be scheduled with an BESSIE. ( Per staff message from nursing)

## 2023-07-20 NOTE — LETTER
7/20/2023         RE: Negin Jonas  [address]  [place] [address]        Dear Colleague,    Thank you for referring your patient, Negin Jonas, to the [facility] NEUROLOGICAL [facility]IN[facility]. Please see a copy of my visit note below.    Doing well 6 mos post-op.  Pain and tremors resolved.  No symptoms currently.    Past Medical History:   Diagnosis Date     Advanced directives, counseling/discussion 3/7/2012     Anxiety attack 2006    EEG normal, improved with lamictal     Chronic rhinitis 1/31/2011     Closed head injury 2004    subarachnoid hemorrage/cerebral contusion with sequellae of significant diminished short term memory, word finding difficulties and emotional lability, was in coma for 2 months, on seizure prophylaxsis with lamictal, f/b neuro annually     Generalized anxiety disorder      Major depression      Major depression in complete remission (H) 1/31/2011    was seen by psychiatrist in past, has tried Effexor and Seroquel and Hiram's Wart     Migraine headache     resolved s/p closed head injury     Panic attacks      Psoriasis 1/31/2011     Short-term memory loss 2004    secondary to closed head injury     Subarachnoid hemorrhage following injury with loss of consciousness and death due to other cause prior to regaining consciousness, initial encounter 6/1/2004     Past Surgical History:   Procedure Laterality Date     ARTHROPLASTY KNEE Right 03/10/2021    Procedure: ARTHROPLASTY, KNEE, TOTAL;  Surgeon: Hamilton Martinez MD;  Location:  OR     DISCECTOMY, FUSION CERVICAL ANTERIOR TWO LEVELS, COMBINED N/A 1/6/2023    Procedure: Cervical 3 to Cervical 5 Anterior Cervical Discectomy Fusion   ;  Surgeon: Rick Franco MD;  Location:  OR     ESOPHAGOSCOPY, GASTROSCOPY, DUODENOSCOPY (EGD), COMBINED N/A 12/17/2015    Procedure: COMBINED ESOPHAGOSCOPY, GASTROSCOPY, DUODENOSCOPY (EGD);  Surgeon: Duane, William Charles, MD;  Location:  OR      ESOPHAGOSCOPY, GASTROSCOPY, DUODENOSCOPY (EGD), COMBINED N/A 12/17/2015    Procedure: COMBINED ESOPHAGOSCOPY, GASTROSCOPY, DUODENOSCOPY (EGD), BIOPSY SINGLE OR MULTIPLE;  Surgeon: Duane, William Charles, MD;  Location: [facility] OR     EYE SURGERY Bilateral 11/2022    cataract     HC REMOVAL GALLBLADDER       HYSTERECTOMY, PAP NO LONGER INDICATED  1992    with ovaries     IR MISCELLANEOUS PROCEDURE  08/21/2004     RELEASE CARPAL TUNNEL BILATERAL       ZZC TOTAL KNEE ARTHROPLASTY Left 06/10/2020    DML     Social History     Socioeconomic History     Marital status:      Spouse name: Not on file     Number of children: Not on file     Years of education: Not on file     Highest education level: Not on file   Occupational History     Not on file   Tobacco Use     Smoking status: Former     Packs/day: 1.00     Years: 25.00     Pack years: 25.00     Types: Cigarettes     Passive exposure: Never     Smokeless tobacco: Never     Tobacco comments:     Quit 15 years ago.   Vaping Use     Vaping Use: Never used   Substance and Sexual Activity     Alcohol use: No     Drug use: No     Sexual activity: Never   Other Topics Concern     Parent/sibling w/ CABG, MI or angioplasty before 65F 55M? Not Asked   Social History Narrative     Not on file     Social Determinants of Health     Financial Resource Strain: Not on file   Food Insecurity: Not on file   Transportation Needs: Not on file   Physical Activity: Not on file   Stress: Not on file   Social Connections: Not on file   Intimate Partner Violence: Not on file   Housing Stability: Not on file     Family History   Problem Relation Age of Onset     C.A.D. Father 70        CABG x 3      Respiratory Father         emphysema     Psychotic Disorder Father         depression     Diabetes Mother      Psychotic Disorder Mother         depression     Diabetes Maternal Grandfather      Hypertension Sister      Anxiety Disorder Sister      Respiratory Sister         emphysema        ROS:  10 point ROS neg other than the symptoms noted above in the HPI.    Physical Exam  BP (!) 168/90   Pulse 57   SpO2 96%   HEENT:  Normocephalic, atraumatic.  PERRLA.  EOM s intact.  Visual fields full to gross exam  Neck:  Supple, non-tender, without lymphadenopathy.  Heart:  No peripheral edema  Lungs:  No SOB  Abdomen:  Non-distended.   Skin:  Warm and dry.  Extremities:  No edema, cyanosis or clubbing.  Psychiatric:  No apparent distress  Musculoskeletal:  Normal bulk and tone    NEUROLOGICAL EXAMINATION:     Mental status:  Alert and Oriented x 3, speech is fluent.  Cranial nerves:  II-XII intact.   Motor:    Shoulder Abduction:  Right:  5/5   Left:  5/5  Biceps:                      Right:  5/5   Left:  5/5  Triceps:                     Right:  5/5   Left:  5/5  Wrist Extensors:       Right:  5/5   Left:  5/5  Wrist Flexors:           Right:  5/5   Left:  5/5  interosseus :            Right:  5/5   Left:  5/5  Hip Flexor:                Right: 5/5  Left:  5/5  Quadriceps:             Right:  5/5  Left:  5/5  Hamstrings:             Right:  5/5  Left:  5/5  Gastroc Soleus:        Right:  5/5  Left:  5/5  Tib/Ant:                      Right:  5/5  Left:  5/5  EHL:                     Right:  5/5  Left:  5/5  Sensation:  Intact  Reflexes:  Negative Babinski.  Negative Clonus.  Negative Hill's.  Coordination:  Smooth finger to nose testing.   Negative pronator drift.  Smooth tandem walking.  Incision well healed    A/P:  XRs stable  Continue routine follow up      Routine follow up with BESSIE with x-ray prior in 6 months. Message sent to scheduling. Writer did not see patient after today's visit.    Liz Rivera RN on 7/20/2023 at 10:08 AM        Again, thank you for allowing me to participate in the care of your patient.        Sincerely,        Rick Franco MD

## 2023-07-20 NOTE — NURSING NOTE
"Negin Jonas is a 78 year old female who presents for:  Chief Complaint   Patient presents with     Surgical Followup     DOS 1/6/23, C3-C5 cervical fusion-Patient feels good, no pain        Initial Vitals:  BP (!) 168/90   Pulse 57   SpO2 96%  Estimated body mass index is 28.32 kg/m  as calculated from the following:    Height as of 5/31/23: 5' 4.96\" (1.65 m).    Weight as of 5/31/23: 170 lb (77.1 kg).. There is no height or weight on file to calculate BSA. BP completed using cuff size: regular  No Pain (0)    Nursing Comments:       Joseline Nolen    "

## 2023-07-21 NOTE — TELEPHONE ENCOUNTER
2nd attempt to schedule post surgical follow up.  Patient is to be seen in January of 2024 with imaging prior- may be scheduled with BESSIE. Per staff message from nursing.

## 2023-07-24 NOTE — TELEPHONE ENCOUNTER
3rd attempt to schedule follow up for January of 2024 along with imaging. Patient can be scheduled with BESSIE and not surgeon for this follow up visit.

## 2023-07-26 DIAGNOSIS — K21.9 GASTROESOPHAGEAL REFLUX DISEASE WITHOUT ESOPHAGITIS: ICD-10-CM

## 2023-07-26 NOTE — TELEPHONE ENCOUNTER
Prescription approved per [facility] Refill Protocol.  Dafne Falcon, RN  [facility] Triage Nurse

## 2023-08-22 ENCOUNTER — OFFICE VISIT (OUTPATIENT)
Dept: NEUROLOGY | Facility: CLINIC | Age: 78
End: 2023-08-22
Attending: INTERNAL MEDICINE
Payer: COMMERCIAL

## 2023-08-22 DIAGNOSIS — M79.644 PAIN OF FINGER OF RIGHT HAND: ICD-10-CM

## 2023-08-22 DIAGNOSIS — R20.0 NUMBNESS AND TINGLING IN RIGHT HAND: ICD-10-CM

## 2023-08-22 DIAGNOSIS — R20.2 NUMBNESS AND TINGLING IN RIGHT HAND: ICD-10-CM

## 2023-08-22 NOTE — PROGRESS NOTES
[facility]  Electrodiagnostic Laboratory    Nerve Conduction & EMG Report          Patient:       Negin Jonas  Patient ID:    [id number]  Gender:        Female  YOB: 1945  Age:           78 Years 3 Months        History & Examination:  78 year old woman with numbness and paresthesias in her fingers. She has a history of neck pain s/p anterior cervical diskectomy. This study is being performed to investigate for radiculopathy vs focal neuropathy.     Techniques: Motor and sensory conduction studies were done with surface recording electrodes. EMG was done with a concentric needle electrode.      Results:  Nerve conduction studies:  1. Right median-D2 sensory response shows moderately slowed CV and normal amplitude.   2. Right ulnar-D5 and radial sensory responses are normal.   3. Right median-ulnar palmar interlatency difference is prolonged.   4. Right median-APB, ulnar-FDI, and ulnar-ADM motor responses are normal.   5. Right median-lumbrical vs ulnar-interosseous latency difference is prolonged.    Needle EMG:  No abnormal spontaneous activity was seen in the sampled muscles.   Large amplitude motor unit potentials (MUP) were seen in the right triceps and PT muscles.   Recruitment patterns were normal.     Interpretation:  This is an abnormal study. 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 There is no evidence of an ulnar neuropathy affecting the right upper limb on the basis of this study. Clinical correlation is recommended.     Navin Amato MD  Department of Neurology           Sensory NCS      Nerve / Sites Rec. Site Onset Peak Ref. NP Amp Ref. PP Amp Dist Adriano Ref. Temp     ms ms ms  V  V  V cm m/s m/s  C   R MEDIAN - Dig II Anti      Wrist Dig II 3.23 4.01  10.3 10.0 9.9 14 43.4 48.0 32.2   R  ULNAR - Dig V Anti      Wrist Dig V 2.08 2.92  11.1 8.0 10.8 12.5 60.0 48.0 32.5   R RADIAL - Snuff      Forearm Snuff 1.51 2.08  20.8 15.0 28.7 10 66.2 48.0    R MEDIAN - Ulnar - Palmar      Median Wrist 1.72 2.29 2.40 24.5  26.8 8 46.5  32.5      Ulnar Wrist 1.46 1.88 2.40 6.9  6.8 8 54.9  32.5       Motor NCS      Nerve / Sites Rec. Site Lat Ref. Amp Ref. Rel Amp Dist Adriano Ref. Dur. Area Temp.     ms ms mV mV % cm m/s m/s ms %  C   R MEDIAN - APB      Wrist APB 3.75 4.40 6.0 5.0 100 8   5.83 100 32.6      Elbow APB 8.02  5.7  94.5 22 51.5 48.0 6.09 92.7 32.6   R ULNAR - ADM      Wrist ADM 3.02 3.50 7.2 5.0 100 8   5.16 100 32.4      B.Elbow ADM 7.19  6.8  93.6 22 52.8 48.0 5.21 98.3 32.5      A.Elbow ADM 8.70  6.7  92.1 8 53.0 48.0 5.42 89 32.5   R MEDIAN - II Lumb      Median II Lumb 3.85  1.6  100 10   4.90 100 32.5      Ulnar Palm Int 3.28  3.3  207 10   4.48 124 32.5   R ULNAR - FDI      Wrist FDI 3.13  6.1  100    5.47 100       B.Elbow FDI 6.82  5.9  95.6 23 62.2  5.73 95.7       A.Elbow FDI 8.44  5.8  94.4 10 61.9  5.99 102        EMG Summary Table     Spontaneous MUAP Recruitment    IA Fib/PSW Fasc H.F. Amp Dur. PPP Pattern   R. DELTOID N None None None N N N Normal   R. BICEPS N None None None N N N Normal   R. TRICEPS N None None None 2+ N N Normal   R. PRON TERES N None None None 1+ N 1+ Normal   R. FIRST D INTEROSS N None None None N N N Normal

## 2023-08-22 NOTE — LETTER
8/22/2023       RE: Negin Jonas  [address]  [place] [address]     Dear Colleague,    Thank you for referring your patient, Negin Jonas, to the  [facility] at [facility]. Please see a copy of my visit note below.        [facility]  Electrodiagnostic Laboratory    Nerve Conduction & EMG Report          Patient:       Negin Jonas  Patient ID:    [id number]  Gender:        Female  YOB: 1945  Age:           78 Years 3 Months        History & Examination:  78 year old woman with numbness and paresthesias in her fingers. She has a history of neck pain s/p anterior cervical diskectomy. This study is being performed to investigate for radiculopathy vs focal neuropathy.     Techniques: Motor and sensory conduction studies were done with surface recording electrodes. EMG was done with a concentric needle electrode.      Results:  Nerve conduction studies:  1. Right median-D2 sensory response shows moderately slowed CV and normal amplitude.   2. Right ulnar-D5 and radial sensory responses are normal.   3. Right median-ulnar palmar interlatency difference is prolonged.   4. Right median-APB, ulnar-FDI, and ulnar-ADM motor responses are normal.   5. Right median-lumbrical vs ulnar-interosseous latency difference is prolonged.    Needle EMG:  No abnormal spontaneous activity was seen in the sampled muscles.   Large amplitude motor unit potentials (MUP) were seen in the right triceps and PT muscles.   Recruitment patterns were normal.     Interpretation:  This is an abnormal study. 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 There is no evidence of an ulnar neuropathy affecting the right upper limb on the basis of this  study. Clinical correlation is recommended.     Navin Amato MD  Department of Neurology           Sensory NCS      Nerve / Sites Rec. Site Onset Peak Ref. NP Amp Ref. PP Amp Dist Adriano Ref. Temp     ms ms ms  V  V  V cm m/s m/s  C   R MEDIAN - Dig II Anti      Wrist Dig II 3.23 4.01  10.3 10.0 9.9 14 43.4 48.0 32.2   R ULNAR - Dig V Anti      Wrist Dig V 2.08 2.92  11.1 8.0 10.8 12.5 60.0 48.0 32.5   R RADIAL - Snuff      Forearm Snuff 1.51 2.08  20.8 15.0 28.7 10 66.2 48.0    R MEDIAN - Ulnar - Palmar      Median Wrist 1.72 2.29 2.40 24.5  26.8 8 46.5  32.5      Ulnar Wrist 1.46 1.88 2.40 6.9  6.8 8 54.9  32.5       Motor NCS      Nerve / Sites Rec. Site Lat Ref. Amp Ref. Rel Amp Dist Adriano Ref. Dur. Area Temp.     ms ms mV mV % cm m/s m/s ms %  C   R MEDIAN - APB      Wrist APB 3.75 4.40 6.0 5.0 100 8   5.83 100 32.6      Elbow APB 8.02  5.7  94.5 22 51.5 48.0 6.09 92.7 32.6   R ULNAR - ADM      Wrist ADM 3.02 3.50 7.2 5.0 100 8   5.16 100 32.4      B.Elbow ADM 7.19  6.8  93.6 22 52.8 48.0 5.21 98.3 32.5      A.Elbow ADM 8.70  6.7  92.1 8 53.0 48.0 5.42 89 32.5   R MEDIAN - II Lumb      Median II Lumb 3.85  1.6  100 10   4.90 100 32.5      Ulnar Palm Int 3.28  3.3  207 10   4.48 124 32.5   R ULNAR - FDI      Wrist FDI 3.13  6.1  100    5.47 100       B.Elbow FDI 6.82  5.9  95.6 23 62.2  5.73 95.7       A.Elbow FDI 8.44  5.8  94.4 10 61.9  5.99 102        EMG Summary Table     Spontaneous MUAP Recruitment    IA Fib/PSW Fasc H.F. Amp Dur. PPP Pattern   R. DELTOID N None None None N N N Normal   R. BICEPS N None None None N N N Normal   R. TRICEPS N None None None 2+ N N Normal   R. PRON TERES N None None None 1+ N 1+ Normal   R. FIRST D INTEROSS N None None None N N N Normal                              Again, thank you for allowing me to participate in the care of your patient.      Sincerely,    Navin Amato MD

## 2023-08-23 DIAGNOSIS — G56.01 CARPAL TUNNEL SYNDROME OF RIGHT WRIST: Primary | ICD-10-CM

## 2023-10-04 NOTE — ADDENDUM NOTE
Addended by: JAQUELIN KINSEY on: 1/27/2023 11:50 AM     Modules accepted: Orders     Patient calls and complains of a terrible \"outbreak\" and states that the vaginal estrace and bethamethasone and it is not helping. She has tried cold compress and other home remedies with no relief. Any other suggestions.

## 2023-10-18 DIAGNOSIS — F41.1 GENERALIZED ANXIETY DISORDER: ICD-10-CM

## 2023-10-18 DIAGNOSIS — F33.41 RECURRENT MAJOR DEPRESSIVE DISORDER, IN PARTIAL REMISSION (H): ICD-10-CM

## 2023-10-18 RX ORDER — CITALOPRAM HYDROBROMIDE 20 MG/1
20 TABLET ORAL DAILY
Qty: 90 TABLET | Refills: 1 | Status: SHIPPED | OUTPATIENT
Start: 2023-10-18 | End: 2024-05-06

## 2024-02-06 ENCOUNTER — TELEPHONE (OUTPATIENT)
Dept: NEUROSURGERY | Facility: CLINIC | Age: 79
End: 2024-02-06
Payer: COMMERCIAL

## 2024-02-06 NOTE — TELEPHONE ENCOUNTER
[facility]-reschedule appt and XR on 2/9 as provider will not be in clinic. Offered 2/8 in [place] and 2/23 in [facility] with Jacob Stovall.

## 2024-02-21 ENCOUNTER — PATIENT OUTREACH (OUTPATIENT)
Dept: CARE COORDINATION | Facility: CLINIC | Age: 79
End: 2024-02-21
Payer: COMMERCIAL

## 2024-03-06 ENCOUNTER — PATIENT OUTREACH (OUTPATIENT)
Dept: CARE COORDINATION | Facility: CLINIC | Age: 79
End: 2024-03-06
Payer: COMMERCIAL

## 2024-03-08 ENCOUNTER — ANCILLARY PROCEDURE (OUTPATIENT)
Dept: GENERAL RADIOLOGY | Facility: CLINIC | Age: 79
End: 2024-03-08
Attending: NEUROLOGICAL SURGERY
Payer: COMMERCIAL

## 2024-03-08 ENCOUNTER — OFFICE VISIT (OUTPATIENT)
Dept: NEUROSURGERY | Facility: CLINIC | Age: 79
End: 2024-03-08
Payer: COMMERCIAL

## 2024-03-08 VITALS
HEART RATE: 62 BPM | HEIGHT: 65 IN | WEIGHT: 170 LBS | BODY MASS INDEX: 28.32 KG/M2 | DIASTOLIC BLOOD PRESSURE: 86 MMHG | SYSTOLIC BLOOD PRESSURE: 162 MMHG

## 2024-03-08 DIAGNOSIS — K21.9 GASTROESOPHAGEAL REFLUX DISEASE WITHOUT ESOPHAGITIS: ICD-10-CM

## 2024-03-08 DIAGNOSIS — Z98.1 S/P CERVICAL SPINAL FUSION: ICD-10-CM

## 2024-03-08 DIAGNOSIS — Z98.1 S/P CERVICAL SPINAL FUSION: Primary | ICD-10-CM

## 2024-03-08 PROCEDURE — 72040 X-RAY EXAM NECK SPINE 2-3 VW: CPT | Performed by: RADIOLOGY

## 2024-03-08 PROCEDURE — 99212 OFFICE O/P EST SF 10 MIN: CPT | Performed by: PHYSICIAN ASSISTANT

## 2024-03-08 ASSESSMENT — PAIN SCALES - GENERAL: PAINLEVEL: NO PAIN (0)

## 2024-03-08 NOTE — NURSING NOTE
"Negin Jonas's goals for this visit include:   Chief Complaint   Patient presents with    RECHECK     6 month follow up with imaging prior:resched from 1/26, 2/9, 2/23(do not ask to  reschedule to [place]!) (1/18 rescheduled by  Dereje, self referred,  records in Epic) (2/7 rescheduled by  Dereje, self referred, records in           She requests these members of her care team be copied on today's visit information: yes    PCP: Shanel Ramirez    Referring Provider:  Jacob Stovall PA-C  [address]  [place] [address]    BP (!) 162/86   Pulse 62   Ht 1.651 m (5' 5\")   Wt 77.1 kg (170 lb)   BMI 28.29 kg/m      Do you need any medication refills at today's visit? No  SYLWIA Patel, RAMONA ([facility])      "

## 2024-03-08 NOTE — LETTER
3/8/2024         RE: Negin Jonas  [address] [place] [id number]        Dear Colleague,    Thank you for referring your patient, Negin Jonas, to the [facility] NEUROSURGERY CLINIC [place]. Please see a copy of my visit note below.    Neurosurgery ACDF follow-up    Patient is 1 year status post C3-5 ACDF.  She denies any symptoms currently she is feeling very well.  She is very pleased with her surgical outcome.    Exam    Alert and oriented no acute distress  Moving all extremities appropriate strength  Gait is normal    Imaging    X-rays demonstrate expected placement of C 3-5 fusion hardware.    Assessment    1 year status post C3-5 ACDF      Plan      No further follow-up is needed.          Again, thank you for allowing me to participate in the care of your patient.        Sincerely,        Jacob Stovall PA-C

## 2024-03-08 NOTE — PROGRESS NOTES
Neurosurgery ACDF follow-up    Patient is 1 year status post C3-5 ACDF.  She denies any symptoms currently she is feeling very well.  She is very pleased with her surgical outcome.    Exam    Alert and oriented no acute distress  Moving all extremities appropriate strength  Gait is normal    Imaging    X-rays demonstrate expected placement of C 3-5 fusion hardware.    Assessment    1 year status post C3-5 ACDF      Plan      No further follow-up is needed.

## 2024-03-27 ENCOUNTER — TELEPHONE (OUTPATIENT)
Dept: FAMILY MEDICINE | Facility: CLINIC | Age: 79
End: 2024-03-27
Payer: COMMERCIAL

## 2024-03-27 NOTE — TELEPHONE ENCOUNTER
Patient Quality Outreach    Patient is due for the following:   Depression  -  PHQ-9 needed  Physical Annual Wellness Visit      Topic Date Due    Zoster (Shingles) Vaccine (2 of 3) 06/06/2014    COVID-19 Vaccine (3 - 2023-24 season) 09/01/2023       Next Steps:   Schedule a Annual Wellness Visit    Type of outreach:    Sent [employer] message.      Questions for provider review:    None           Judith Baeza MA

## 2024-04-07 DIAGNOSIS — J30.2 SEASONAL ALLERGIC RHINITIS, UNSPECIFIED TRIGGER: ICD-10-CM

## 2024-04-08 RX ORDER — FLUTICASONE PROPIONATE 50 MCG
SPRAY, SUSPENSION (ML) NASAL
Qty: 16 G | Refills: 0 | Status: SHIPPED | OUTPATIENT
Start: 2024-04-08 | End: 2024-06-19

## 2024-05-06 DIAGNOSIS — F41.1 GENERALIZED ANXIETY DISORDER: ICD-10-CM

## 2024-05-06 DIAGNOSIS — F33.41 RECURRENT MAJOR DEPRESSIVE DISORDER, IN PARTIAL REMISSION (H): ICD-10-CM

## 2024-05-06 RX ORDER — CITALOPRAM HYDROBROMIDE 20 MG/1
20 TABLET ORAL DAILY
Qty: 90 TABLET | Refills: 0 | Status: SHIPPED | OUTPATIENT
Start: 2024-05-06 | End: 2024-06-19

## 2024-05-09 DIAGNOSIS — K21.9 GASTROESOPHAGEAL REFLUX DISEASE WITHOUT ESOPHAGITIS: ICD-10-CM

## 2024-06-15 ENCOUNTER — HEALTH MAINTENANCE LETTER (OUTPATIENT)
Age: 79
End: 2024-06-15

## 2024-06-19 ENCOUNTER — OFFICE VISIT (OUTPATIENT)
Dept: FAMILY MEDICINE | Facility: CLINIC | Age: 79
End: 2024-06-19
Payer: COMMERCIAL

## 2024-06-19 VITALS
DIASTOLIC BLOOD PRESSURE: 72 MMHG | HEIGHT: 64 IN | SYSTOLIC BLOOD PRESSURE: 146 MMHG | TEMPERATURE: 98.4 F | WEIGHT: 185.5 LBS | BODY MASS INDEX: 31.67 KG/M2 | OXYGEN SATURATION: 96 % | HEART RATE: 63 BPM | RESPIRATION RATE: 16 BRPM

## 2024-06-19 DIAGNOSIS — Z00.00 ENCOUNTER FOR MEDICARE ANNUAL WELLNESS EXAM: Primary | ICD-10-CM

## 2024-06-19 DIAGNOSIS — D50.9 IRON DEFICIENCY ANEMIA, UNSPECIFIED IRON DEFICIENCY ANEMIA TYPE: ICD-10-CM

## 2024-06-19 DIAGNOSIS — S06.9X9S TRAUMATIC BRAIN INJURY WITH LOSS OF CONSCIOUSNESS, SEQUELA (H): ICD-10-CM

## 2024-06-19 DIAGNOSIS — R03.0 ELEVATED BP WITHOUT DIAGNOSIS OF HYPERTENSION: ICD-10-CM

## 2024-06-19 DIAGNOSIS — J30.2 SEASONAL ALLERGIC RHINITIS, UNSPECIFIED TRIGGER: ICD-10-CM

## 2024-06-19 DIAGNOSIS — M20.61 TOE DEFORMITY, ACQUIRED, RIGHT: ICD-10-CM

## 2024-06-19 DIAGNOSIS — K21.9 GASTROESOPHAGEAL REFLUX DISEASE WITHOUT ESOPHAGITIS: ICD-10-CM

## 2024-06-19 DIAGNOSIS — F33.41 RECURRENT MAJOR DEPRESSIVE DISORDER, IN PARTIAL REMISSION (H): ICD-10-CM

## 2024-06-19 DIAGNOSIS — G40.909 SEIZURE DISORDER (H): ICD-10-CM

## 2024-06-19 DIAGNOSIS — Z13.220 SCREENING CHOLESTEROL LEVEL: ICD-10-CM

## 2024-06-19 DIAGNOSIS — F41.1 GENERALIZED ANXIETY DISORDER: ICD-10-CM

## 2024-06-19 DIAGNOSIS — R45.86 EMOTIONAL LABILITY: ICD-10-CM

## 2024-06-19 DIAGNOSIS — G56.01 CARPAL TUNNEL SYNDROME OF RIGHT WRIST: ICD-10-CM

## 2024-06-19 LAB
ERYTHROCYTE [DISTWIDTH] IN BLOOD BY AUTOMATED COUNT: 13.2 % (ref 10–15)
HCT VFR BLD AUTO: 41.5 % (ref 35–47)
HGB BLD-MCNC: 13.5 G/DL (ref 11.7–15.7)
MCH RBC QN AUTO: 28.2 PG (ref 26.5–33)
MCHC RBC AUTO-ENTMCNC: 32.5 G/DL (ref 31.5–36.5)
MCV RBC AUTO: 87 FL (ref 78–100)
PLATELET # BLD AUTO: 254 10E3/UL (ref 150–450)
RBC # BLD AUTO: 4.78 10E6/UL (ref 3.8–5.2)
WBC # BLD AUTO: 7 10E3/UL (ref 4–11)

## 2024-06-19 PROCEDURE — 99214 OFFICE O/P EST MOD 30 MIN: CPT | Mod: 25 | Performed by: FAMILY MEDICINE

## 2024-06-19 PROCEDURE — 85027 COMPLETE CBC AUTOMATED: CPT | Performed by: FAMILY MEDICINE

## 2024-06-19 PROCEDURE — 80053 COMPREHEN METABOLIC PANEL: CPT | Performed by: FAMILY MEDICINE

## 2024-06-19 PROCEDURE — 82728 ASSAY OF FERRITIN: CPT | Performed by: FAMILY MEDICINE

## 2024-06-19 PROCEDURE — 80061 LIPID PANEL: CPT | Performed by: FAMILY MEDICINE

## 2024-06-19 PROCEDURE — G0439 PPPS, SUBSEQ VISIT: HCPCS | Performed by: FAMILY MEDICINE

## 2024-06-19 PROCEDURE — 36415 COLL VENOUS BLD VENIPUNCTURE: CPT | Performed by: FAMILY MEDICINE

## 2024-06-19 RX ORDER — RESPIRATORY SYNCYTIAL VIRUS VACCINE 120MCG/0.5
0.5 KIT INTRAMUSCULAR ONCE
Qty: 1 EACH | Refills: 0 | Status: CANCELLED | OUTPATIENT
Start: 2024-06-19 | End: 2024-06-19

## 2024-06-19 RX ORDER — FLUTICASONE PROPIONATE 50 MCG
1 SPRAY, SUSPENSION (ML) NASAL DAILY
Qty: 16 G | Refills: 3 | Status: SHIPPED | OUTPATIENT
Start: 2024-06-19

## 2024-06-19 RX ORDER — CITALOPRAM HYDROBROMIDE 20 MG/1
20 TABLET ORAL DAILY
Qty: 90 TABLET | Refills: 3 | Status: SHIPPED | OUTPATIENT
Start: 2024-06-19

## 2024-06-19 SDOH — HEALTH STABILITY: PHYSICAL HEALTH: ON AVERAGE, HOW MANY DAYS PER WEEK DO YOU ENGAGE IN MODERATE TO STRENUOUS EXERCISE (LIKE A BRISK WALK)?: 4 DAYS

## 2024-06-19 ASSESSMENT — PATIENT HEALTH QUESTIONNAIRE - PHQ9
10. IF YOU CHECKED OFF ANY PROBLEMS, HOW DIFFICULT HAVE THESE PROBLEMS MADE IT FOR YOU TO DO YOUR WORK, TAKE CARE OF THINGS AT HOME, OR GET ALONG WITH OTHER PEOPLE: NOT DIFFICULT AT ALL
SUM OF ALL RESPONSES TO PHQ QUESTIONS 1-9: 0
SUM OF ALL RESPONSES TO PHQ QUESTIONS 1-9: 0

## 2024-06-19 ASSESSMENT — PAIN SCALES - GENERAL: PAINLEVEL: NO PAIN (0)

## 2024-06-19 ASSESSMENT — SOCIAL DETERMINANTS OF HEALTH (SDOH): HOW OFTEN DO YOU GET TOGETHER WITH FRIENDS OR RELATIVES?: ONCE A WEEK

## 2024-06-19 NOTE — PATIENT INSTRUCTIONS
"Patient Education   Preventive Care Advice   This is general advice we often give to help people stay healthy. Your care team may have specific advice just for you. Please talk to your care team about your own preventive care needs.  Lifestyle  Exercise at least 150 minutes each week (30 minutes a day, 5 days a week).  Do muscle strengthening activities 2 days a week. These help control your weight and prevent disease.  No smoking.  Wear sunscreen to prevent skin cancer.  Have your home tested for radon every 2 to 5 years. Radon is a colorless, odorless gas that can harm your lungs. To learn more, go to www.health.[facility].[place].us and search for \"Radon in Homes.\"  Keep guns unloaded and locked up in a safe place like a safe or gun vault, or, use a gun lock and hide the keys. Always lock away bullets separately. To learn more, visit [employer].gov and search for \"safe gun storage.\"  Nutrition  Eat 5 or more servings of fruits and vegetables each day.  Try wheat bread, brown rice and whole grain pasta (instead of white bread, rice, and pasta).  Get enough calcium and vitamin D. Check the label on foods and aim for 100% of the RDA (recommended daily allowance).  Regular exams  Have a dental exam and cleaning every 6 months.  See your health care team every year to talk about:  Any changes in your health.  Any medicines your care team has prescribed.  Preventive care, family planning, and ways to prevent chronic diseases.  Shots (vaccines)   HPV shots (up to age 26), if you've never had them before.  Hepatitis B shots (up to age 59), if you've never had them before.  COVID-19 shot: Get this shot when it's due.  Flu shot: Get a flu shot every year.  Tetanus shot: Get a tetanus shot every 10 years.  Pneumococcal, hepatitis A, and RSV shots: Ask your care team if you need these based on your risk.  Shingles shot (for age 50 and up).  General health tests  Diabetes screening:  Starting at age 35, Get screened for diabetes at least " every 3 years.  If you are younger than age 35, ask your care team if you should be screened for diabetes.  Cholesterol test: At age 39, start having a cholesterol test every 5 years, or more often if advised.  Bone density scan (DEXA): At age 50, ask your care team if you should have this scan for osteoporosis (brittle bones).  Hepatitis C: Get tested at least once in your life.  Abdominal aortic aneurysm screening: Talk to your doctor about having this screening if you:  Have ever smoked; and  Are biologically male; and  Are between the ages of 65 and 75.  STIs (sexually transmitted infections)  Before age 24: Ask your care team if you should be screened for STIs.  After age 24: Get screened for STIs if you're at risk. You are at risk for STIs (including HIV) if:  You are sexually active with more than one person.  You don't use condoms every time.  You or a partner was diagnosed with a sexually transmitted infection.  If you are at risk for HIV, ask about PrEP medicine to prevent HIV.  Get tested for HIV at least once in your life, whether you are at risk for HIV or not.  Cancer screening tests  Cervical cancer screening: If you have a cervix, begin getting regular cervical cancer screening tests at age 21. Most people who have regular screenings with normal results can stop after age 65. Talk about this with your provider.  Breast cancer scan (mammogram): If you've ever had breasts, begin having regular mammograms starting at age 40. This is a scan to check for breast cancer.  Colon cancer screening: It is important to start screening for colon cancer at age 45.  Have a colonoscopy test every 10 years (or more often if you're at risk) Or, ask your provider about stool tests like a FIT test every year or Cologuard test every 3 years.  To learn more about your testing options, visit: www.[employer]/008302.pdf.  For help making a decision, visit: zbigniew/dw04647.  Prostate cancer screening test: If you have a  prostate and are age 55 to 69, ask your provider if you would benefit from a yearly prostate cancer screening test.  Lung cancer screening: If you are a current or former smoker age 50 to 80, ask your care team if ongoing lung cancer screenings are right for you.  For informational purposes only. Not to replace the advice of your health care provider. Copyright   2023 [employer]. All rights reserved. Clinically reviewed by the [facility] Transitions Program. [place] [id number] - REV 04/24.

## 2024-06-19 NOTE — PROGRESS NOTES
Preventive Care Visit  [facility]  Shanel Vaughn Ramirez MD, Family Medicine  Jun 19, 2024      Assessment & Plan     Encounter for Medicare annual wellness exam  Routine health maintenance otherwise up-to-date.  Discussed recommended vaccinations    Recurrent major depressive disorder, in partial remission (H24)  Stable on current therapy and will continue same  - citalopram (CELEXA) 20 MG tablet; Take 1 tablet (20 mg) by mouth daily    Generalized anxiety disorder  As above  - citalopram (CELEXA) 20 MG tablet; Take 1 tablet (20 mg) by mouth daily    Seasonal allergic rhinitis, unspecified trigger  Stable on current regimen.  Continue same plan and routine follow-up.   - fluticasone (FLONASE) 50 MCG/ACT nasal spray; Spray 1 spray into both nostrils daily    Gastroesophageal reflux disease without esophagitis  Stable on current regimen.  Continue same plan and routine follow-up.   - omeprazole (PRILOSEC) 20 MG DR capsule; Take 2 capsules (40 mg) by mouth daily    Traumatic brain injury with loss of consciousness, sequela (H24)  Reviewed interval history with neurology.  All of this relates to a significant traumatic brain injury about 20 years ago and since then she has had some short-term memory loss, emotional lability.  She was placed on seizure prophylaxis and things were changed around to the point that he she is currently taking Lamictal once daily.  She is seizure-free though it is unknown whether she would actually continue to have seizures.  But this may be helping with anxiety and emotional lability as well so the decision was made to continue Lamictal as she is doing.  - Comprehensive metabolic panel (BMP + Alb, Alk Phos, ALT, AST, Total. Bili, TP); Future    Seizure disorder (H)  As above  - Comprehensive metabolic panel (BMP + Alb, Alk Phos, ALT, AST, Total. Bili, TP); Future    Emotional lability  As above  - Comprehensive metabolic panel (BMP + Alb, Alk Phos, ALT, AST, Total.  "LISA Rodriguez); Future    Carpal tunnel syndrome of right wrist  Status post carpal tunnel surgery number of years ago but has had return of symptoms on the right do not seem to go away with conservative management.  - Orthopedic  Referral; Future    Toe deformity, acquired, right  Significant overlapping toes on her right foot she is interested in meeting with podiatry  - Orthopedic  Referral; Future    Iron deficiency anemia, unspecified iron deficiency anemia type  Stable and following  - CBC with platelets; Future  - Ferritin; Future    Elevated BP without diagnosis of hypertension  Stable and following    Screening cholesterol level    - Lipid panel reflex to direct LDL Non-fasting; Future    Patient has been advised of split billing requirements and indicates understanding: Yes        BMI  Estimated body mass index is 31.84 kg/m  as calculated from the following:    Height as of this encounter: 1.626 m (5' 4\").    Weight as of this encounter: 84.1 kg (185 lb 8 oz).       Counseling  Appropriate preventive services were discussed with this patient, including applicable screening as appropriate for fall prevention, nutrition, physical activity, Tobacco-use cessation, weight loss and cognition.  Checklist reviewing preventive services available has been given to the patient.  Reviewed patient's diet, addressing concerns and/or questions.       Work on weight loss  Regular exercise  See Patient Instructions    Subjective   Negin is a 79 year old, presenting for the following:  Medicare Visit        6/19/2024    11:45 AM   Additional Questions   Roomed by Aleja MCKEE   Accompanied by self         6/19/2024    11:45 AM   Patient Reported Additional Medications   Patient reports taking the following new medications None         Health Care Directive  Patient has a Health Care Directive on file  Advance care planning document is on file and is current.    HPI      Here today for routine wellness exam and " follow-up on stable chronic issues      6/19/2024   General Health   How would you rate your overall physical health? Good   Feel stress (tense, anxious, or unable to sleep) Not at all            6/19/2024   Nutrition   Diet: Regular (no restrictions)            6/19/2024   Exercise   Days per week of moderate/strenous exercise 4 days            6/19/2024   Social Factors   Frequency of gathering with friends or relatives Once a week   Worry food won't last until get money to buy more Patient declined   Food not last or not have enough money for food? Patient declined   Do you have housing? (Housing is defined as stable permanent housing and does not include staying ouside in a car, in a tent, in an abandoned building, in an overnight shelter, or couch-surfing.) Patient declined   Are you worried about losing your housing? Patient declined   Lack of transportation? Patient declined   Unable to get utilities (heat,electricity)? Patient declined            6/19/2024   Fall Risk   Fallen 2 or more times in the past year? No   Trouble with walking or balance? No             6/19/2024   Activities of Daily Living- Home Safety   Needs help with the following daily activites None of the above   Safety concerns in the home None of the above            6/19/2024   Dental   Dentist two times every year? Yes            6/19/2024   Hearing Screening   Hearing concerns? None of the above            6/19/2024   Driving Risk Screening   Patient/family members have concerns about driving No            6/19/2024   General Alertness/Fatigue Screening   Have you been more tired than usual lately? No            6/19/2024   Urinary Incontinence Screening   Bothered by leaking urine in past 6 months No            6/19/2024   TB Screening   Were you born outside of the [place]? No          Today's PHQ-9 Score:       6/19/2024    11:37 AM   PHQ-9 SCORE   PHQ-9 Total Score MyChart 0   PHQ-9 Total Score 0         6/19/2024   Substance Use    Alcohol more than 3/day or more than 7/wk Not Applicable   Do you have a current opioid prescription? No   How severe/bad is pain from 1 to 10? 0/10 (No Pain)   Do you use any other substances recreationally? No        Social History     Tobacco Use    Smoking status: Former     Current packs/day: 1.00     Average packs/day: 1 pack/day for 25.0 years (25.0 ttl pk-yrs)     Types: Cigarettes     Passive exposure: Never    Smokeless tobacco: Never    Tobacco comments:     Quit 15 years ago.   Vaping Use    Vaping status: Never Used   Substance Use Topics    Alcohol use: No    Drug use: No              ASCVD Risk   The ASCVD Risk score (Tiffanie DK, et al., 2019) failed to calculate for the following reasons:    The patient has a prior MI or stroke diagnosis            Reviewed and updated as needed this visit by Provider   Tobacco  Allergies  Meds  Problems  Med Hx  Surg Hx  Fam Hx            Lab work is in process  Labs reviewed in EPIC  BP Readings from Last 3 Encounters:   06/19/24 (!) 146/72   03/08/24 (!) 162/86   07/20/23 (!) 168/90    Wt Readings from Last 3 Encounters:   06/19/24 84.1 kg (185 lb 8 oz)   03/08/24 77.1 kg (170 lb)   05/31/23 77.1 kg (170 lb)                  Current providers sharing in care for this patient include:  Patient Care Team:  Shanel Ramirez MD as PCP - General (Family Medicine)  Shanel Ramirez MD as Assigned PCP  Care, [facility] ([facility] ([facility]  Rick Fracno MD as Assigned Neuroscience Provider  Jacob Stovall PA-C as Physician Assistant (Neurological Surgery)    The following health maintenance items are reviewed in Epic and correct as of today:  Health Maintenance   Topic Date Due    LUNG CANCER SCREENING  08/12/2005    RSV VACCINE (Pregnancy & 60+) (1 - 1-dose 60+ series) Never done    ZOSTER IMMUNIZATION (2 of 3) 06/06/2014    COVID-19 Vaccine (3 - 2023-24 season) 09/01/2023    PHQ-9  12/19/2024    JERROD   "01/08/2025    MEDICARE ANNUAL WELLNESS VISIT  06/19/2025    ANNUAL REVIEW OF HM ORDERS  06/19/2025    FALL RISK ASSESSMENT  06/19/2025    GLUCOSE  01/06/2026    DTAP/TDAP/TD IMMUNIZATION (4 - Td or Tdap) 02/15/2027    LIPID  03/22/2028    ADVANCE CARE PLANNING  06/19/2029    HEPATITIS C SCREENING  Completed    DEPRESSION ACTION PLAN  Completed    INFLUENZA VACCINE  Completed    Pneumococcal Vaccine: 65+ Years  Completed    IPV IMMUNIZATION  Aged Out    HPV IMMUNIZATION  Aged Out    MENINGITIS IMMUNIZATION  Aged Out    RSV MONOCLONAL ANTIBODY  Aged Out    MAMMO SCREENING  Discontinued    COLORECTAL CANCER SCREENING  Discontinued         Review of Systems  CONSTITUTIONAL: NEGATIVE for fever, chills, change in weight  INTEGUMENTARY/SKIN: NEGATIVE for worrisome rashes, moles or lesions  EYES: NEGATIVE for vision changes or irritation  ENT/MOUTH: NEGATIVE for ear, mouth and throat problems  RESP: NEGATIVE for significant cough or SOB  BREAST: NEGATIVE for masses, tenderness or discharge  CV: NEGATIVE for chest pain, palpitations or peripheral edema  GI: NEGATIVE for nausea, abdominal pain, heartburn, or change in bowel habits  : NEGATIVE for frequency, dysuria, or hematuria  MUSCULOSKELETAL: NEGATIVE for significant arthralgias or myalgia  NEURO: NEGATIVE for weakness, dizziness or paresthesias  ENDOCRINE: NEGATIVE for temperature intolerance, skin/hair changes  HEME: NEGATIVE for bleeding problems  PSYCHIATRIC: NEGATIVE for changes in mood or affect     Objective    Exam  BP (!) 146/72 (BP Location: Right arm, Patient Position: Sitting, Cuff Size: Adult Large)   Pulse 63   Temp 98.4  F (36.9  C) (Oral)   Resp 16   Ht 1.626 m (5' 4\")   Wt 84.1 kg (185 lb 8 oz)   SpO2 96%   BMI 31.84 kg/m     Estimated body mass index is 31.84 kg/m  as calculated from the following:    Height as of this encounter: 1.626 m (5' 4\").    Weight as of this encounter: 84.1 kg (185 lb 8 oz).    Physical Exam  GENERAL: alert and no " distress  EYES: Eyes grossly normal to inspection, PERRL and conjunctivae and sclerae normal  HENT: ear canals and TM's normal, nose and mouth without ulcers or lesions  NECK: no adenopathy, no asymmetry, masses, or scars  RESP: lungs clear to auscultation - no rales, rhonchi or wheezes  CV: regular rate and rhythm, normal S1 S2, no S3 or S4, no murmur, click or rub, no peripheral edema  ABDOMEN: soft, nontender, no hepatosplenomegaly, no masses and bowel sounds normal  MS: no gross musculoskeletal defects noted, no edema  SKIN: no suspicious lesions or rashes  NEURO: Normal strength and tone, mentation intact and speech normal  PSYCH: mentation appears normal, affect normal/bright        6/19/2024   Mini Cog   Clock Draw Score 2 Normal   3 Item Recall 3 objects recalled   Mini Cog Total Score 5                 Signed Electronically by: Shanel Ramirez MD    Answers submitted by the patient for this visit:  Patient Health Questionnaire (Submitted on 6/19/2024)  If you checked off any problems, how difficult have these problems made it for you to do your work, take care of things at home, or get along with other people?: Not difficult at all  PHQ9 TOTAL SCORE: 0

## 2024-06-20 LAB
ALBUMIN SERPL BCG-MCNC: 4.3 G/DL (ref 3.5–5.2)
ALP SERPL-CCNC: 95 U/L (ref 40–150)
ALT SERPL W P-5'-P-CCNC: 14 U/L (ref 0–50)
ANION GAP SERPL CALCULATED.3IONS-SCNC: 9 MMOL/L (ref 7–15)
AST SERPL W P-5'-P-CCNC: 19 U/L (ref 0–45)
BILIRUB SERPL-MCNC: 0.3 MG/DL
BUN SERPL-MCNC: 17.2 MG/DL (ref 8–23)
CALCIUM SERPL-MCNC: 10.2 MG/DL (ref 8.8–10.2)
CHLORIDE SERPL-SCNC: 104 MMOL/L (ref 98–107)
CHOLEST SERPL-MCNC: 244 MG/DL
CREAT SERPL-MCNC: 0.68 MG/DL (ref 0.51–0.95)
DEPRECATED HCO3 PLAS-SCNC: 26 MMOL/L (ref 22–29)
EGFRCR SERPLBLD CKD-EPI 2021: 89 ML/MIN/1.73M2
FASTING STATUS PATIENT QL REPORTED: NO
FASTING STATUS PATIENT QL REPORTED: NO
FERRITIN SERPL-MCNC: 52 NG/ML (ref 11–328)
GLUCOSE SERPL-MCNC: 84 MG/DL (ref 70–99)
HDLC SERPL-MCNC: 77 MG/DL
LDLC SERPL CALC-MCNC: 144 MG/DL
NONHDLC SERPL-MCNC: 167 MG/DL
POTASSIUM SERPL-SCNC: 4.7 MMOL/L (ref 3.4–5.3)
PROT SERPL-MCNC: 7 G/DL (ref 6.4–8.3)
SODIUM SERPL-SCNC: 139 MMOL/L (ref 135–145)
TRIGL SERPL-MCNC: 115 MG/DL

## 2024-06-21 NOTE — RESULT ENCOUNTER NOTE
"Negin Nugent,  Overall your lab work looks great.  The only thing of note is that your cholesterol level is up a little bit from the last few years.  The good thing is that you still have a fantastic HDL (\"good\" cholesterol), but your LDL (\"bad\" cholesterol) has jumped up a bit.  So just a good reminder to keep up with exercise and healthy diet.  RUY Ramirez M.D. "

## 2024-06-25 ENCOUNTER — ANCILLARY PROCEDURE (OUTPATIENT)
Dept: GENERAL RADIOLOGY | Facility: OTHER | Age: 79
End: 2024-06-25
Attending: STUDENT IN AN ORGANIZED HEALTH CARE EDUCATION/TRAINING PROGRAM
Payer: COMMERCIAL

## 2024-06-25 ENCOUNTER — OFFICE VISIT (OUTPATIENT)
Dept: ORTHOPEDICS | Facility: OTHER | Age: 79
End: 2024-06-25
Attending: FAMILY MEDICINE
Payer: COMMERCIAL

## 2024-06-25 DIAGNOSIS — M25.531 BILATERAL WRIST PAIN: Primary | ICD-10-CM

## 2024-06-25 DIAGNOSIS — M25.532 BILATERAL WRIST PAIN: ICD-10-CM

## 2024-06-25 DIAGNOSIS — M25.531 BILATERAL WRIST PAIN: ICD-10-CM

## 2024-06-25 DIAGNOSIS — M25.532 BILATERAL WRIST PAIN: Primary | ICD-10-CM

## 2024-06-25 DIAGNOSIS — M18.12 PRIMARY OSTEOARTHRITIS OF FIRST CARPOMETACARPAL JOINT OF LEFT HAND: ICD-10-CM

## 2024-06-25 DIAGNOSIS — G56.01 CARPAL TUNNEL SYNDROME OF RIGHT WRIST: ICD-10-CM

## 2024-06-25 PROCEDURE — 99204 OFFICE O/P NEW MOD 45 MIN: CPT | Performed by: STUDENT IN AN ORGANIZED HEALTH CARE EDUCATION/TRAINING PROGRAM

## 2024-06-25 PROCEDURE — 73110 X-RAY EXAM OF WRIST: CPT | Mod: TC | Performed by: RADIOLOGY

## 2024-06-25 RX ORDER — GABAPENTIN 100 MG/1
CAPSULE ORAL
Qty: 90 CAPSULE | Refills: 1 | Status: SHIPPED | OUTPATIENT
Start: 2024-06-25 | End: 2024-07-16 | Stop reason: SINTOL

## 2024-06-25 ASSESSMENT — PAIN SCALES - GENERAL: PAINLEVEL: MODERATE PAIN (5)

## 2024-06-25 NOTE — PROGRESS NOTES
Negin Jonas  :  1945  DOS: 2024  MRN: [id number]  PCP: Shanel Ramirez    Sports Medicine Clinic Visit    HPI  Negin Jonas is a 79 year old female who is seen in consultation at the request of  Shanel Ramirez M.D. presenting with right wrist pain.    - Mechanism of Injury:    - No inciting injury  - Pertinent history and prior evaluations:    - S/p bilateral carpal tunnel release over 10 years ago. Is now noticing right wrist symptoms again.   - Also s/p C3-C5 ADCF procedure on 23    - 2024 Shanel Ramirez MD: Referral to othopedics for evaluation and treatment options.    - 2023 EMG: Interpretation:   This is an abnormal study. There is electrophysiologic evidence of a   (1) mild right-sided median neuropathy at the wrist as can be seen in the clinical context of carpal tunnel syndrome and   (2) a superimposed chronic right-sided C7 radiculopathy.   There is no evidence of an ulnar neuropathy affecting the right upper limb on the basis of this study. Clinical correlation is recommended.     - 2023 Right wrist xray IMPRESSION:  1.  Moderate thumb CMC degenerative arthrosis.  2.  Mild thumb IP and second MCP degenerative arthrosis.  3.  No fracture.  4.  Bone demineralization.    - Pain Character:    - Location:  right ulnar wrist and digits 4 and 5. Left thumb and ulnar wrist.  - Character:  constant aching pain with waxing and waning  - Duration:  several years  - Course:  worsening  - Endorses:    -  bilateral hand weakness, pain, numbness of right digits 4 and 5  - Denies:    - swelling, clicking/popping, grinding, mechanical locking symptoms, instability  - Alleviating factors:    -  nothing  - Aggravating factors:    -  keeps her up at night  - Other treatments tried:    -  Tylenol    - Patient Goals:    - get a formal diagnosis, discuss treatment options  - Social History:   - Retired.      Review of Systems  Musculoskeletal: as above  Remainder of  review of systems is negative including constitutional, CV, pulmonary, GI, Skin and Neurologic except as noted in HPI or medical history.    Past Medical History:   Diagnosis Date    Advanced directives, counseling/discussion 3/7/2012    Anxiety attack 2006    EEG normal, improved with lamictal    Chronic rhinitis 1/31/2011    Closed head injury 2004    subarachnoid hemorrage/cerebral contusion with sequellae of significant diminished short term memory, word finding difficulties and emotional lability, was in coma for 2 months, on seizure prophylaxsis with lamictal, f/b neuro annually    Generalized anxiety disorder     Major depression     Major depression in complete remission (H24) 1/31/2011    was seen by psychiatrist in past, has tried Effexor and Seroquel and Hiram's Wart    Migraine headache     resolved s/p closed head injury    Panic attacks     Psoriasis 1/31/2011    Short-term memory loss 2004    secondary to closed head injury    Subarachnoid hemorrhage following injury with loss of consciousness and death due to other cause prior to regaining consciousness, initial encounter 6/1/2004     Past Surgical History:   Procedure Laterality Date    ARTHROPLASTY KNEE Right 03/10/2021    Procedure: ARTHROPLASTY, KNEE, TOTAL;  Surgeon: Hamilton Martinez MD;  Location:  OR    DISCECTOMY, FUSION CERVICAL ANTERIOR TWO LEVELS, COMBINED N/A 1/6/2023    Procedure: Cervical 3 to Cervical 5 Anterior Cervical Discectomy Fusion   ;  Surgeon: Rick Franco MD;  Location:  OR    ESOPHAGOSCOPY, GASTROSCOPY, DUODENOSCOPY (EGD), COMBINED N/A 12/17/2015    Procedure: COMBINED ESOPHAGOSCOPY, GASTROSCOPY, DUODENOSCOPY (EGD);  Surgeon: Duane, William Charles, MD;  Location:  OR    ESOPHAGOSCOPY, GASTROSCOPY, DUODENOSCOPY (EGD), COMBINED N/A 12/17/2015    Procedure: COMBINED ESOPHAGOSCOPY, GASTROSCOPY, DUODENOSCOPY (EGD), BIOPSY SINGLE OR MULTIPLE;  Surgeon: Duane, William Charles, MD;  Location:  OR    EYE SURGERY  Bilateral 11/2022    cataract    HC REMOVAL GALLBLADDER      HYSTERECTOMY, PAP NO LONGER INDICATED  1992    with ovaries    IR MISCELLANEOUS PROCEDURE  08/21/2004    RELEASE CARPAL TUNNEL BILATERAL      ZZC TOTAL KNEE ARTHROPLASTY Left 06/10/2020    DML     Family History   Problem Relation Age of Onset    C.A.D. Father 70        CABG x 3     Respiratory Father         emphysema    Psychotic Disorder Father         depression    Diabetes Mother     Psychotic Disorder Mother         depression    Diabetes Maternal Grandfather     Hypertension Sister     Anxiety Disorder Sister     Respiratory Sister         emphysema         Objective  There were no vitals taken for this visit.    General: healthy, alert and in no acute distress.    HEENT: no scleral icterus or conjunctival erythema.   Skin: no suspicious lesions or rash. No jaundice.   CV: regular rhythm by palpation, 2+ distal pulses.  Resp: normal respiratory effort without conversational dyspnea.   Psych: normal mood and affect.    Gait: nonantalgic, appropriate coordination and balance.     Neuro:        - Sensation to light touch:    - Diminished sensation in both the median and ulnar nerve distributions distal to the wrist on the right. Otherwise SILT in all other nerve distributions.        - MSR:       RUE  LUE  - Biceps  2+ 2+  - Brachioradialis 2+ 2+  - Triceps  2+ 2+       - Special tests:   - Spurling's:  Neg    - Tinel's at the wrist: Positive on the right   - Tinel's at the elbow:  Neg    - Stressed Phalen's: Positive on the right    MSK - Wrist/Hand:        - Inspection:    - No significant swelling, erythema, warmth, ecchymosis, lesion, or atrophy noted.        - ROM:    - Full AROM/PROM       - Palpation:    - TTP at the first CMC joints bilaterally.   - NTTP elsewhere.        - Strength:  (*antalgic)   - Wrist Extension   5   - Wrist Flexion    5   - FDI     5   - ADM     5   - FPL     5   - APB     5   - EIP     5   - EDC     5   -  APL/EPB    5          - Special tests:        - CMC grind: Positive bilaterally   - Finkelstein's:  Neg    - TFCC compression:  Neg    - Morton's:  Neg       Radiology  I independently reviewed the available relevant imaging in the chart with my interpretations as above in HPI.     I independently reviewed today's new relevant imaging, with the following interpretation:  - XR B/L wrists shows diffuse bony demineralization with multiple areas of varying degrees of osteoarthritis, severe at the left first CMC joint, moderate at the right first CMC joint, moderate at bilateral STT joints.  No acute fractures or dislocations.      Assessment  1. Bilateral wrist pain    2. Carpal tunnel syndrome of right wrist    3. Primary osteoarthritis of first carpometacarpal joint of left hand        Carlitos Jonas is a pleasant 79 year old female that presents with chronic bilateral wrist pain and paresthesias.  She has a significant history of bilateral carpal tunnel release procedures 10+ years ago with recurrence of her carpal tunnel symptoms in the right hand.  EMG in August 2023 also confirmed a mild right-sided median mononeuropathy at the wrist as well as a superimposed chronic right-sided C7 cervical radiculopathy.  Radiographs confirm primary osteoarthritis of the hands affecting the first CMC joint and STT joints bilaterally as well as scattered other interphalangeal joints.     In addition to her clinical right CTS symptoms, she does have some ulnar neuropathy symptoms without forearm numbness which may be from an underlying ulnar neuropathy at the Guyon's canal.  She would like to try carpal tunnel wrist bracing at night for 6 weeks, home exercise program, gabapentin, and activity modifications.    - Thumb spica braces given due to bilateral CMC osteoarthritis, worse on the left where she does have basilar thumb pain.    - Gabapentin prescription written today with a ramp-up protocol.    - HEP given with  instructions.    - Will consider corticosteroid injections in the future if needed, not especially interested in injections.    - Follow-up as needed.  May contact the clinic at any time for questions/concerns.    Benja Phillips DO, CAQSM  [facility] - Department of Orthopedic Surgery       Disclaimer:  This note was prepared and written using Dragon Medical dictation software. As a result, there may be errors in the script that have gone undetected. Please consider this when interpreting the information in this note.
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
## 2024-06-26 ENCOUNTER — ANCILLARY PROCEDURE (OUTPATIENT)
Dept: GENERAL RADIOLOGY | Facility: OTHER | Age: 79
End: 2024-06-26
Attending: PODIATRIST
Payer: COMMERCIAL

## 2024-06-26 ENCOUNTER — OFFICE VISIT (OUTPATIENT)
Dept: PODIATRY | Facility: OTHER | Age: 79
End: 2024-06-26
Attending: FAMILY MEDICINE
Payer: COMMERCIAL

## 2024-06-26 VITALS
WEIGHT: 185.5 LBS | BODY MASS INDEX: 31.67 KG/M2 | SYSTOLIC BLOOD PRESSURE: 152 MMHG | HEIGHT: 64 IN | DIASTOLIC BLOOD PRESSURE: 82 MMHG

## 2024-06-26 DIAGNOSIS — M20.41 HAMMERTOE OF RIGHT FOOT: Primary | ICD-10-CM

## 2024-06-26 DIAGNOSIS — S06.9X9S TRAUMATIC BRAIN INJURY WITH LOSS OF CONSCIOUSNESS, SEQUELA (H): ICD-10-CM

## 2024-06-26 DIAGNOSIS — M20.41 HAMMERTOE OF RIGHT FOOT: ICD-10-CM

## 2024-06-26 PROCEDURE — 99203 OFFICE O/P NEW LOW 30 MIN: CPT | Performed by: PODIATRIST

## 2024-06-26 PROCEDURE — 73630 X-RAY EXAM OF FOOT: CPT | Mod: TC | Performed by: RADIOLOGY

## 2024-06-26 ASSESSMENT — PAIN SCALES - GENERAL: PAINLEVEL: MODERATE PAIN (5)

## 2024-06-26 NOTE — PATIENT INSTRUCTIONS
Reliable shoe stores: To maximize your experience and provide the best possible fit.  Be sure to show them your foot concerns and tell them Dr. Blanchard sent you.      Stores listed in bold have only athletic shoes, and stores that are not bold are mostly casual or variety of shoes    [employer]  [address] [place] [address]  [phone]    [employer]  [address] [place] [address]  [phone]     [email or web address] [facility]  [address] [place] [address]  [phone]    [employer]  [address] [facility]  [facility] [place] [address]  [phone]    [employer]  [address] [place], [place] [address] [phone]    [employer]  [address] [place] [address]  [phone]                         [employer] Locations:     [address] [place] [address]   [phone]     [address] [place] [address] [address] [place] [address]   [phone]     [address] [place] [address]   [phone]     [address] [facility] [place] [address]   [phone]     [address] [place] [address]   [phone]     [address] [place] [address]   [phone]     [address] [facility] [address]   [place] [address]   [phone]     [address] [facility] [address]   [place] [address]   [phone]             How to find reasonable shoes          The correct width    Correct Fitting    Correct Length      Foot Distortion    Posture Distortion                          Torsional Rigidity      Grasp behind the heel and underneath the foot and twist      Bad    Excessive torsion/twist in midfoot     Less torsion/twist in midfoot is better                   Heel Counter Rigidity      Grasp just above   midsole and squeeze      Bad    Soft heel counter      Good    Rigid Heel Counter      Flexion Rigidity      Grasp shoe and bend from forefoot to rearfoot            Obtain roomy sturdy shoes from a good shoe store  and see if this accomodates your deformities.  Follow up inf this remains painful or limiting your life, comfort or safety. Or you have more questions. Straightening toes is not a simple process and this is progressive as we continue to walk even after surgical treatments.
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
## 2024-07-16 ENCOUNTER — MYC MEDICAL ADVICE (OUTPATIENT)
Dept: ORTHOPEDICS | Facility: OTHER | Age: 79
End: 2024-07-16
Payer: COMMERCIAL

## 2024-07-16 DIAGNOSIS — G56.01 CARPAL TUNNEL SYNDROME OF RIGHT WRIST: Primary | ICD-10-CM

## 2024-07-16 RX ORDER — DULOXETIN HYDROCHLORIDE 30 MG/1
30 CAPSULE, DELAYED RELEASE ORAL DAILY
Qty: 21 CAPSULE | Refills: 0 | Status: SHIPPED | OUTPATIENT
Start: 2024-07-16 | End: 2024-08-05

## 2024-07-16 NOTE — TELEPHONE ENCOUNTER
Called and discussed medications and side effects with patient and .  She felt dizzy and confused and had a fall while on the gabapentin.  No other contributing factors that they could think of for the symptoms besides starting on gabapentin.  The gabapentin did however take away virtually all of her nerve pain in her hands so she is interested in a similar medication with less side effects.  After the above side effects, they promptly discontinued her dosing, which is certainly appropriate.  Interested in other medication options.      We discussed that for neuropathic pain, gabapentin, Lyrica, and Cymbalta are often considered initially.  With her side effects to gabapentin, I would not recommend Lyrica either.  Cymbalta is worthy of a try to see if it decreases her neuropathic pain at least is much as gabapentin without the side effects.  We discussed additional side effects to look out for with Cymbalta and we will see how much it helps.     will let me know an update at 2 weeks to discuss how much Cymbalta helps and if any side effects are experienced.      Benja Phillips DO, CAQSM  [facility] - Sports Medicine  [facility] - Department of Orthopedic Surgery

## 2024-07-16 NOTE — TELEPHONE ENCOUNTER
Per chart review:  -6/25/24 last OV with Dr. Phillips, discussed carpal tunnel, prescribed gabapentin.    Meg Dill, ATC

## 2024-08-02 ENCOUNTER — MYC MEDICAL ADVICE (OUTPATIENT)
Dept: ORTHOPEDICS | Facility: CLINIC | Age: 79
End: 2024-08-02
Payer: COMMERCIAL

## 2024-08-02 DIAGNOSIS — G56.01 CARPAL TUNNEL SYNDROME OF RIGHT WRIST: ICD-10-CM

## 2024-08-04 ENCOUNTER — MYC MEDICAL ADVICE (OUTPATIENT)
Dept: ORTHOPEDICS | Facility: OTHER | Age: 79
End: 2024-08-04
Payer: COMMERCIAL

## 2024-08-04 DIAGNOSIS — G56.01 CARPAL TUNNEL SYNDROME OF RIGHT WRIST: ICD-10-CM

## 2024-08-05 RX ORDER — DULOXETIN HYDROCHLORIDE 30 MG/1
30 CAPSULE, DELAYED RELEASE ORAL DAILY
Qty: 21 CAPSULE | Refills: 0 | Status: SHIPPED | OUTPATIENT
Start: 2024-08-05 | End: 2024-08-22

## 2024-08-06 NOTE — TELEPHONE ENCOUNTER
Signed and sent to pharmacy yesterday. Thank you.     Benja Phillips DO, CAQSM [facility] - Sports Medicine  [facility] - Department of Orthopedic Surgery

## 2024-08-22 DIAGNOSIS — G56.01 CARPAL TUNNEL SYNDROME OF RIGHT WRIST: ICD-10-CM

## 2024-08-22 RX ORDER — DULOXETIN HYDROCHLORIDE 30 MG/1
30 CAPSULE, DELAYED RELEASE ORAL DAILY
Qty: 30 CAPSULE | Refills: 1 | Status: SHIPPED | OUTPATIENT
Start: 2024-08-22 | End: 2024-09-21

## 2025-01-18 ENCOUNTER — TELEPHONE (OUTPATIENT)
Dept: FAMILY MEDICINE | Facility: CLINIC | Age: 80
End: 2025-01-18
Payer: COMMERCIAL

## 2025-01-18 NOTE — TELEPHONE ENCOUNTER
Reason for Call:  Appointment Request    Patient requesting this type of appt:  has been having shoulder pain for awhile that has gotten worse    Requested provider:  anybody at clinic    Reason patient unable to be scheduled: Needs to be scheduled by clinic    When does patient want to be seen/preferred time: Same day or asap    Comments:  Didn't want to speak to a nurse    Could we send this information to you in [facility] or would you prefer to receive a phone call?:   Patient would prefer a phone call   Okay to leave a detailed message?: Yes at Cell number on file:    Telephone Information:   Mobile [phone]       Call taken on 1/18/2025 at 9:03 AM by JUAN RAMON MONAE

## 2025-01-23 DIAGNOSIS — J30.2 SEASONAL ALLERGIC RHINITIS, UNSPECIFIED TRIGGER: ICD-10-CM

## 2025-01-23 RX ORDER — FLUTICASONE PROPIONATE 50 MCG
SPRAY, SUSPENSION (ML) NASAL
Qty: 24 ML | Refills: 0 | Status: SHIPPED | OUTPATIENT
Start: 2025-01-23

## 2025-02-09 DIAGNOSIS — J30.2 SEASONAL ALLERGIC RHINITIS, UNSPECIFIED TRIGGER: ICD-10-CM

## 2025-02-10 RX ORDER — FLUTICASONE PROPIONATE 50 MCG
SPRAY, SUSPENSION (ML) NASAL
Qty: 16 ML | Refills: 0 | Status: SHIPPED | OUTPATIENT
Start: 2025-02-10

## 2025-02-25 DIAGNOSIS — J30.2 SEASONAL ALLERGIC RHINITIS, UNSPECIFIED TRIGGER: ICD-10-CM

## 2025-02-25 RX ORDER — FLUTICASONE PROPIONATE 50 MCG
SPRAY, SUSPENSION (ML) NASAL
Qty: 16 ML | Refills: 0 | Status: SHIPPED | OUTPATIENT
Start: 2025-02-25

## 2025-03-10 ENCOUNTER — OFFICE VISIT (OUTPATIENT)
Dept: ORTHOPEDICS | Facility: CLINIC | Age: 80
End: 2025-03-10
Payer: COMMERCIAL

## 2025-03-10 ENCOUNTER — ANCILLARY PROCEDURE (OUTPATIENT)
Dept: GENERAL RADIOLOGY | Facility: CLINIC | Age: 80
End: 2025-03-10
Attending: NURSE PRACTITIONER
Payer: COMMERCIAL

## 2025-03-10 VITALS — WEIGHT: 188 LBS | BODY MASS INDEX: 32.1 KG/M2 | HEIGHT: 64 IN

## 2025-03-10 DIAGNOSIS — M19.011 OSTEOARTHRITIS OF RIGHT GLENOHUMERAL JOINT: Primary | ICD-10-CM

## 2025-03-10 DIAGNOSIS — M25.511 RIGHT SHOULDER PAIN: ICD-10-CM

## 2025-03-10 DIAGNOSIS — M25.511 RIGHT SHOULDER PAIN: Primary | ICD-10-CM

## 2025-03-10 PROCEDURE — 73030 X-RAY EXAM OF SHOULDER: CPT | Mod: TC | Performed by: RADIOLOGY

## 2025-03-10 PROCEDURE — 20610 DRAIN/INJ JOINT/BURSA W/O US: CPT | Mod: RT | Performed by: NURSE PRACTITIONER

## 2025-03-10 PROCEDURE — 1125F AMNT PAIN NOTED PAIN PRSNT: CPT | Performed by: ORTHOPAEDIC SURGERY

## 2025-03-10 PROCEDURE — 99213 OFFICE O/P EST LOW 20 MIN: CPT | Mod: 25 | Performed by: ORTHOPAEDIC SURGERY

## 2025-03-10 RX ORDER — BUPIVACAINE HYDROCHLORIDE 5 MG/ML
3 INJECTION, SOLUTION EPIDURAL; INTRACAUDAL; PERINEURAL
Status: COMPLETED | OUTPATIENT
Start: 2025-03-10 | End: 2025-03-10

## 2025-03-10 RX ORDER — TRIAMCINOLONE ACETONIDE 40 MG/ML
40 INJECTION, SUSPENSION INTRA-ARTICULAR; INTRAMUSCULAR
Status: COMPLETED | OUTPATIENT
Start: 2025-03-10 | End: 2025-03-10

## 2025-03-10 RX ADMIN — TRIAMCINOLONE ACETONIDE 40 MG: 40 INJECTION, SUSPENSION INTRA-ARTICULAR; INTRAMUSCULAR at 11:06

## 2025-03-10 RX ADMIN — BUPIVACAINE HYDROCHLORIDE 3 ML: 5 INJECTION, SOLUTION EPIDURAL; INTRACAUDAL; PERINEURAL at 11:06

## 2025-03-10 ASSESSMENT — PAIN SCALES - GENERAL: PAINLEVEL_OUTOF10: SEVERE PAIN (9)

## 2025-03-10 NOTE — PROGRESS NOTES
Large Joint Injection/Arthocentesis: R subacromial bursa    Date/Time: 3/10/2025 11:06 AM    Performed by: Cali Fernandez APRN CNP  Authorized by: Cali Fernandez APRN CNP    Indications:  Pain  Needle Size:  25 G  Guidance: landmark guided    Approach:  Posterolateral  Location:  Shoulder      Site:  R subacromial bursa  Medications:  40 mg triamcinolone 40 MG/ML; 3 mL BUPivacaine (PF) 0.5 %  Outcome:  Tolerated well, no immediate complications  Procedure discussed: discussed risks, benefits, and alternatives    Consent Given by:  Patient  Timeout: timeout called immediately prior to procedure    Prep: patient was prepped and draped in usual sterile fashion

## 2025-03-10 NOTE — LETTER
3/10/2025      Negin Jonas  [address] [address] [place], [place] [address] [address]      Dear Colleague,    Thank you for referring your patient, Negin Jonas, to the [facility]. Please see a copy of my visit note below.    ORTHOPEDIC CONSULT      Chief Complaint: Negin Jonas is a 79 year old female who is being seen for   Chief Complaint   Patient presents with     Right Shoulder - Pain       History of Present Illness:   Presents with her  with generalized right shoulder pain.  Reports has been going on approximately 6 months worsening but may have had pain up to a year.  No specific injuries or traumas.  Posterior shoulder occasionally down her back of her arm.  Describes it as constant worse with any type of motion.  She has been taken Tylenol with no improvement.  No history of shoulder injections or surgeries.      Patient's past medical, surgical, social and family histories reviewed.     Past Medical History:   Diagnosis Date     Advanced directives, counseling/discussion 3/7/2012     Anxiety attack 2006    EEG normal, improved with lamictal     Chronic rhinitis 1/31/2011     Closed head injury 2004    subarachnoid hemorrage/cerebral contusion with sequellae of significant diminished short term memory, word finding difficulties and emotional lability, was in coma for 2 months, on seizure prophylaxsis with lamictal, f/b neuro annually     Generalized anxiety disorder      Major depression      Major depression in complete remission 1/31/2011    was seen by psychiatrist in past, has tried Effexor and Seroquel and Hiram's Wart     Migraine headache     resolved s/p closed head injury     Panic attacks      Psoriasis 1/31/2011     Short-term memory loss 2004    secondary to closed head injury     Subarachnoid hemorrhage following injury with loss of consciousness and death due to other cause prior to regaining consciousness, initial encounter 6/1/2004       Past Surgical  History:   Procedure Laterality Date     ARTHROPLASTY KNEE Right 03/10/2021    Procedure: ARTHROPLASTY, KNEE, TOTAL;  Surgeon: Hamilton Martinez MD;  Location: [facility] OR     DISCECTOMY, FUSION CERVICAL ANTERIOR TWO LEVELS, COMBINED N/A 1/6/2023    Procedure: Cervical 3 to Cervical 5 Anterior Cervical Discectomy Fusion   ;  Surgeon: Rick Franco MD;  Location: [facility] OR     ESOPHAGOSCOPY, GASTROSCOPY, DUODENOSCOPY (EGD), COMBINED N/A 12/17/2015    Procedure: COMBINED ESOPHAGOSCOPY, GASTROSCOPY, DUODENOSCOPY (EGD);  Surgeon: Duane, William Charles, MD;  Location: [facility] OR     ESOPHAGOSCOPY, GASTROSCOPY, DUODENOSCOPY (EGD), COMBINED N/A 12/17/2015    Procedure: COMBINED ESOPHAGOSCOPY, GASTROSCOPY, DUODENOSCOPY (EGD), BIOPSY SINGLE OR MULTIPLE;  Surgeon: Duane, William Charles, MD;  Location: [facility] OR     EYE SURGERY Bilateral 11/2022    cataract     HC REMOVAL GALLBLADDER       HYSTERECTOMY, PAP NO LONGER INDICATED  1992    with ovaries     IR MISCELLANEOUS PROCEDURE  08/21/2004     RELEASE CARPAL TUNNEL BILATERAL       ZZC TOTAL KNEE ARTHROPLASTY Left 06/10/2020    DML       Medications:  Current Outpatient Medications   Medication Sig Dispense Refill     acetaminophen (TYLENOL) 500 MG tablet Take 500-1,000 mg by mouth every 6 hours as needed for mild pain       citalopram (CELEXA) 20 MG tablet Take 1 tablet (20 mg) by mouth daily 90 tablet 3     DULoxetine (CYMBALTA) 30 MG capsule Take 1 capsule (30 mg) by mouth daily. 30 capsule 1     ferrous sulfate (FEROSUL) 325 (65 Fe) MG tablet Take 1 tablet by mouth daily       fluticasone (FLONASE) 50 MCG/ACT nasal spray SPRAY 1 SPRAY INTO BOTH NOSTRILS ONCE DAILY 16 mL 0     lamoTRIgine (LAMICTAL) 200 MG tablet Take 1 tablet (200 mg) by mouth daily Reported on 2/15/2017 90 tablet 3     omeprazole (PRILOSEC) 20 MG DR capsule Take 2 capsules (40 mg) by mouth daily 180 capsule 3     triamcinolone (KENALOG) 0.1 % external cream Apply topically 2 times daily as needed for irritation  "80 g 1     No current facility-administered medications for this visit.       Allergies   Allergen Reactions     Gabapentin Dizziness, Fatigue and Confusion     Codeine Sulfate      Seasonal Allergies      Zofran [Ondansetron] Other (See Comments)     Pt had possible dystonia reaction to zofran 1/6/23       Social History     Occupational History     Not on file   Tobacco Use     Smoking status: Former     Current packs/day: 1.00     Average packs/day: 1 pack/day for 25.0 years (25.0 ttl pk-yrs)     Types: Cigarettes     Passive exposure: Never     Smokeless tobacco: Never     Tobacco comments:     Quit 15 years ago.   Vaping Use     Vaping status: Never Used   Substance and Sexual Activity     Alcohol use: No     Drug use: No     Sexual activity: Never       Family History   Problem Relation Age of Onset     C.A.D. Father 70        CABG x 3      Respiratory Father         emphysema     Psychotic Disorder Father         depression     Diabetes Mother      Psychotic Disorder Mother         depression     Diabetes Maternal Grandfather      Hypertension Sister      Anxiety Disorder Sister      Respiratory Sister         emphysema       REVIEW OF SYSTEMS  10 point review systems performed otherwise negative as noted as per history of present illness.    Physical Exam:  Vitals: Ht 1.635 m (5' 4.37\")   Wt 85.3 kg (188 lb)   BMI 31.90 kg/m    BMI= Body mass index is 31.9 kg/m .  Constitutional: healthy, alert and no acute distress   Psychiatric: mentation appears normal and affect normal/bright  NEURO: no focal deficits  RESP: Normal with easy respirations and no use of accessory muscles to breathe, no audible wheezing or retractions  CV: RUE:  no edema           SKIN: No erythema, rashes, excoriation, or breakdown. No evidence of infection.   JOINT/EXTREMITIES:right upper extremity: She does have a tremor into the hand.  Active motion less than 20/20.  Passively tolerates approximately 80/80.  No point areas of " tenderness.  No focal atrophy.  No evidence of infection.     GAIT: not tested     Diagnostic Modalities:  Right shoulder x-rays: Taken today in the clinic shows no acute fractures or dislocations.  Some superior elevation humeral head relative to the glenoid.  Some joint space narrowing mild with some inferior osteophytes noted along the humeral head.  Independent visualization of the images was performed.      Impression: right shoulder presumed rotator cuff arthropathy    Plan:  All of the above pertinent physical exam and imaging modalities findings was reviewed with Negin and her .                                          INJECTION PROCEDURE:  The patient was counseled about an  injection, including discussion of risks (including infection), contents of the injection, rationale for performing the injection, and expected benefits of the injection. The skin was prepped with alcohol and betadine and then utilizing sterile technique an injection of the right shoulder subacromial space from the posterolateral approach  was performed. The injection consisted 1ml of Kenalog (40mg per 1 ml) mixed with 3ml of 0.5% Marcaine. The patient tolerated the injection well, and there were no complications. The injection site was covered with a Band-Aid. The injection was performed by RANDALL Hunt, CNP, DNP    Options discussed.  No history of trauma.  There is some evidence of possible rotator cuff arthropathy given the elevated humeral head with some degenerative changes.  Recommend the injection.  We also discussed physical therapy which they declined    Return to clinic 4-6 , week(s), PRN, or sooner as needed for changes.  Re-x-ray on return: No    Jeremy Wheat D.O.    Large Joint Injection/Arthocentesis: R subacromial bursa    Date/Time: 3/10/2025 11:06 AM    Performed by: Cali Fernandez APRN CNP  Authorized by: Cali Fernandez APRN CNP    Indications:  Pain  Needle Size:  25  G  Guidance: landmark guided    Approach:  Posterolateral  Location:  Shoulder      Site:  R subacromial bursa  Medications:  40 mg triamcinolone 40 MG/ML; 3 mL BUPivacaine (PF) 0.5 %  Outcome:  Tolerated well, no immediate complications  Procedure discussed: discussed risks, benefits, and alternatives    Consent Given by:  Patient  Timeout: timeout called immediately prior to procedure    Prep: patient was prepped and draped in usual sterile fashion          Again, thank you for allowing me to participate in the care of your patient.        Sincerely,        Lio Wheat, DO    Electronically signed

## 2025-03-10 NOTE — PROGRESS NOTES
ORTHOPEDIC CONSULT      Chief Complaint: Negin Jonas is a 79 year old female who is being seen for   Chief Complaint   Patient presents with    Right Shoulder - Pain       History of Present Illness:   Presents with her  with generalized right shoulder pain.  Reports has been going on approximately 6 months worsening but may have had pain up to a year.  No specific injuries or traumas.  Posterior shoulder occasionally down her back of her arm.  Describes it as constant worse with any type of motion.  She has been taken Tylenol with no improvement.  No history of shoulder injections or surgeries.      Patient's past medical, surgical, social and family histories reviewed.     Past Medical History:   Diagnosis Date    Advanced directives, counseling/discussion 3/7/2012    Anxiety attack 2006    EEG normal, improved with lamictal    Chronic rhinitis 1/31/2011    Closed head injury 2004    subarachnoid hemorrage/cerebral contusion with sequellae of significant diminished short term memory, word finding difficulties and emotional lability, was in coma for 2 months, on seizure prophylaxsis with lamictal, f/b neuro annually    Generalized anxiety disorder     Major depression     Major depression in complete remission 1/31/2011    was seen by psychiatrist in past, has tried Effexor and Seroquel and Hiram's Wart    Migraine headache     resolved s/p closed head injury    Panic attacks     Psoriasis 1/31/2011    Short-term memory loss 2004    secondary to closed head injury    Subarachnoid hemorrhage following injury with loss of consciousness and death due to other cause prior to regaining consciousness, initial encounter 6/1/2004       Past Surgical History:   Procedure Laterality Date    ARTHROPLASTY KNEE Right 03/10/2021    Procedure: ARTHROPLASTY, KNEE, TOTAL;  Surgeon: Hamilton Martinez MD;  Location: [facility]    DISCECTOMY, FUSION CERVICAL ANTERIOR TWO LEVELS, COMBINED N/A 1/6/2023    Procedure:  Cervical 3 to Cervical 5 Anterior Cervical Discectomy Fusion   ;  Surgeon: Rick Franco MD;  Location: [facility]    ESOPHAGOSCOPY, GASTROSCOPY, DUODENOSCOPY (EGD), COMBINED N/A 12/17/2015    Procedure: COMBINED ESOPHAGOSCOPY, GASTROSCOPY, DUODENOSCOPY (EGD);  Surgeon: Duane, William Charles, MD;  Location: [facility]    ESOPHAGOSCOPY, GASTROSCOPY, DUODENOSCOPY (EGD), COMBINED N/A 12/17/2015    Procedure: COMBINED ESOPHAGOSCOPY, GASTROSCOPY, DUODENOSCOPY (EGD), BIOPSY SINGLE OR MULTIPLE;  Surgeon: Duane, William Charles, MD;  Location: [facility]    EYE SURGERY Bilateral 11/2022    cataract    HC REMOVAL GALLBLADDER      HYSTERECTOMY, PAP NO LONGER INDICATED  1992    with ovaries    IR MISCELLANEOUS PROCEDURE  08/21/2004    RELEASE CARPAL TUNNEL BILATERAL      ZZC TOTAL KNEE ARTHROPLASTY Left 06/10/2020    DML       Medications:  Current Outpatient Medications   Medication Sig Dispense Refill    acetaminophen (TYLENOL) 500 MG tablet Take 500-1,000 mg by mouth every 6 hours as needed for mild pain      citalopram (CELEXA) 20 MG tablet Take 1 tablet (20 mg) by mouth daily 90 tablet 3    DULoxetine (CYMBALTA) 30 MG capsule Take 1 capsule (30 mg) by mouth daily. 30 capsule 1    ferrous sulfate (FEROSUL) 325 (65 Fe) MG tablet Take 1 tablet by mouth daily      fluticasone (FLONASE) 50 MCG/ACT nasal spray SPRAY 1 SPRAY INTO BOTH NOSTRILS ONCE DAILY 16 mL 0    lamoTRIgine (LAMICTAL) 200 MG tablet Take 1 tablet (200 mg) by mouth daily Reported on 2/15/2017 90 tablet 3    omeprazole (PRILOSEC) 20 MG DR capsule Take 2 capsules (40 mg) by mouth daily 180 capsule 3    triamcinolone (KENALOG) 0.1 % external cream Apply topically 2 times daily as needed for irritation 80 g 1     No current facility-administered medications for this visit.       Allergies   Allergen Reactions    Gabapentin Dizziness, Fatigue and Confusion    Codeine Sulfate     Seasonal Allergies     Zofran [Ondansetron] Other (See Comments)     Pt had possible dystonia  "reaction to zofran 1/6/23       Social History     Occupational History    Not on file   Tobacco Use    Smoking status: Former     Current packs/day: 1.00     Average packs/day: 1 pack/day for 25.0 years (25.0 ttl pk-yrs)     Types: Cigarettes     Passive exposure: Never    Smokeless tobacco: Never    Tobacco comments:     Quit 15 years ago.   Vaping Use    Vaping status: Never Used   Substance and Sexual Activity    Alcohol use: No    Drug use: No    Sexual activity: Never       Family History   Problem Relation Age of Onset    C.A.D. Father 70        CABG x 3     Respiratory Father         emphysema    Psychotic Disorder Father         depression    Diabetes Mother     Psychotic Disorder Mother         depression    Diabetes Maternal Grandfather     Hypertension Sister     Anxiety Disorder Sister     Respiratory Sister         emphysema       REVIEW OF SYSTEMS  10 point review systems performed otherwise negative as noted as per history of present illness.    Physical Exam:  Vitals: Ht 1.635 m (5' 4.37\")   Wt 85.3 kg (188 lb)   BMI 31.90 kg/m    BMI= Body mass index is 31.9 kg/m .  Constitutional: healthy, alert and no acute distress   Psychiatric: mentation appears normal and affect normal/bright  NEURO: no focal deficits  RESP: Normal with easy respirations and no use of accessory muscles to breathe, no audible wheezing or retractions  CV: RUE:  no edema           SKIN: No erythema, rashes, excoriation, or breakdown. No evidence of infection.   JOINT/EXTREMITIES:right upper extremity: She does have a tremor into the hand.  Active motion less than 20/20.  Passively tolerates approximately 80/80.  No point areas of tenderness.  No focal atrophy.  No evidence of infection.     GAIT: not tested     Diagnostic Modalities:  Right shoulder x-rays: Taken today in the clinic shows no acute fractures or dislocations.  Some superior elevation humeral head relative to the glenoid.  Some joint space narrowing mild with " some inferior osteophytes noted along the humeral head.  Independent visualization of the images was performed.      Impression: right shoulder presumed rotator cuff arthropathy    Plan:  All of the above pertinent physical exam and imaging modalities findings was reviewed with Negin and her .                                          INJECTION PROCEDURE:  The patient was counseled about an  injection, including discussion of risks (including infection), contents of the injection, rationale for performing the injection, and expected benefits of the injection. The skin was prepped with alcohol and betadine and then utilizing sterile technique an injection of the right shoulder subacromial space from the posterolateral approach  was performed. The injection consisted 1ml of Kenalog (40mg per 1 ml) mixed with 3ml of 0.5% Marcaine. The patient tolerated the injection well, and there were no complications. The injection site was covered with a Band-Aid. The injection was performed by Pacheco Fernandez, RANDALL, CNP, DNP    Options discussed.  No history of trauma.  There is some evidence of possible rotator cuff arthropathy given the elevated humeral head with some degenerative changes.  Recommend the injection.  We also discussed physical therapy which they declined    Return to clinic 4-6 , week(s), PRN, or sooner as needed for changes.  Re-x-ray on return: No    Jeremy Wheat D.O.

## 2025-03-12 DIAGNOSIS — J30.2 SEASONAL ALLERGIC RHINITIS, UNSPECIFIED TRIGGER: ICD-10-CM

## 2025-03-12 RX ORDER — FLUTICASONE PROPIONATE 50 MCG
SPRAY, SUSPENSION (ML) NASAL
Qty: 16 G | Refills: 2 | Status: SHIPPED | OUTPATIENT
Start: 2025-03-12

## 2025-03-13 ENCOUNTER — MYC MEDICAL ADVICE (OUTPATIENT)
Dept: ORTHOPEDICS | Facility: CLINIC | Age: 80
End: 2025-03-13
Payer: COMMERCIAL

## 2025-04-05 DIAGNOSIS — J30.2 SEASONAL ALLERGIC RHINITIS, UNSPECIFIED TRIGGER: ICD-10-CM

## 2025-04-07 RX ORDER — FLUTICASONE PROPIONATE 50 MCG
SPRAY, SUSPENSION (ML) NASAL
Qty: 48 ML | Refills: 1 | Status: SHIPPED | OUTPATIENT
Start: 2025-04-07

## 2025-05-12 ENCOUNTER — OFFICE VISIT (OUTPATIENT)
Dept: OTOLARYNGOLOGY | Facility: CLINIC | Age: 80
End: 2025-05-12
Payer: COMMERCIAL

## 2025-05-12 VITALS
HEIGHT: 64 IN | BODY MASS INDEX: 32.62 KG/M2 | WEIGHT: 191.1 LBS | TEMPERATURE: 98.1 F | SYSTOLIC BLOOD PRESSURE: 140 MMHG | DIASTOLIC BLOOD PRESSURE: 80 MMHG

## 2025-05-12 DIAGNOSIS — R04.0 EPISTAXIS: Primary | ICD-10-CM

## 2025-05-12 PROCEDURE — 3077F SYST BP >= 140 MM HG: CPT | Performed by: OTOLARYNGOLOGY

## 2025-05-12 PROCEDURE — 3079F DIAST BP 80-89 MM HG: CPT | Performed by: OTOLARYNGOLOGY

## 2025-05-12 PROCEDURE — 30901 CONTROL OF NOSEBLEED: CPT | Mod: LT | Performed by: OTOLARYNGOLOGY

## 2025-05-12 NOTE — LETTER
5/12/2025      Negin Jonas  [id number] [address] [place], [place] [address] [address]      Dear Colleague,    Thank you for referring your patient, Negin Jonas, to the [facility]. Please see a copy of my visit note below.    ENT Consultation    Negin Jonas is a 80 year old female who is seen in consultation at the request of self.      History of Present Illness - Negin Jonas is a 80 year old female presents for evaluation of POSITIVE for:, epistaxis.    The problem started around 6 weeks ago.  Since then she has had multiple nosebleeds quite severe on the left side.  Gushing blood according to the patient.  Local pressure did help but took a while..  She denies any other bleeding disorders history of easy bruising or bleeding gums.  Does not take any anticoagulants right now.      Past Medical History -   Past Medical History:   Diagnosis Date     Advanced directives, counseling/discussion 3/7/2012     Anxiety attack 2006    EEG normal, improved with lamictal     Chronic rhinitis 1/31/2011     Closed head injury 2004    subarachnoid hemorrage/cerebral contusion with sequellae of significant diminished short term memory, word finding difficulties and emotional lability, was in coma for 2 months, on seizure prophylaxsis with lamictal, f/b neuro annually     Generalized anxiety disorder      Major depression      Major depression in complete remission 1/31/2011    was seen by psychiatrist in past, has tried Effexor and Seroquel and Hiram's Wart     Migraine headache     resolved s/p closed head injury     Panic attacks      Psoriasis 1/31/2011     Short-term memory loss 2004    secondary to closed head injury     Subarachnoid hemorrhage following injury with loss of consciousness and death due to other cause prior to regaining consciousness, initial encounter 6/1/2004       Current Medications -   Current Outpatient Medications:      acetaminophen (TYLENOL) 500 MG tablet, Take  500-1,000 mg by mouth every 6 hours as needed for mild pain, Disp: , Rfl:      citalopram (CELEXA) 20 MG tablet, Take 1 tablet (20 mg) by mouth daily, Disp: 90 tablet, Rfl: 3     fluticasone (FLONASE) 50 MCG/ACT nasal spray, SPRAY 1 SPRAY INTO BOTH NOSTRILS ONCE DAILY, Disp: 48 mL, Rfl: 1     lamoTRIgine (LAMICTAL) 200 MG tablet, Take 1 tablet (200 mg) by mouth daily Reported on 2/15/2017, Disp: 90 tablet, Rfl: 3     omeprazole (PRILOSEC) 20 MG DR capsule, Take 2 capsules (40 mg) by mouth daily, Disp: 180 capsule, Rfl: 3     triamcinolone (KENALOG) 0.1 % external cream, Apply topically 2 times daily as needed for irritation, Disp: 80 g, Rfl: 1     DULoxetine (CYMBALTA) 30 MG capsule, Take 1 capsule (30 mg) by mouth daily. (Patient not taking: Reported on 5/12/2025), Disp: 30 capsule, Rfl: 1    Allergies -   Allergies   Allergen Reactions     Gabapentin Dizziness, Fatigue and Confusion     Codeine Sulfate      Seasonal Allergies      Zofran [Ondansetron] Other (See Comments)     Pt had possible dystonia reaction to zofran 1/6/23       Social History -   Social History     Socioeconomic History     Marital status:      Spouse name: None     Number of children: None     Years of education: None     Highest education level: None   Tobacco Use     Smoking status: Former     Current packs/day: 1.00     Average packs/day: 1 pack/day for 25.0 years (25.0 ttl pk-yrs)     Types: Cigarettes     Passive exposure: Never     Smokeless tobacco: Never     Tobacco comments:     Quit 15 years ago.   Vaping Use     Vaping status: Never Used   Substance and Sexual Activity     Alcohol use: No     Drug use: No     Sexual activity: Never     Social Drivers of Health     Financial Resource Strain: Unknown (6/19/2024)    Financial Resource Strain      Within the past 12 months, have you or your family members you live with been unable to get utilities (heat, electricity) when it was really needed?: Patient declined   Food  Insecurity: Unknown (6/19/2024)    Food Insecurity      Within the past 12 months, did you worry that your food would run out before you got money to buy more?: Patient declined      Within the past 12 months, did the food you bought just not last and you didn t have money to get more?: Patient declined   Transportation Needs: Unknown (6/19/2024)    Transportation Needs      Within the past 12 months, has lack of transportation kept you from medical appointments, getting your medicines, non-medical meetings or appointments, work, or from getting things that you need?: Patient declined   Physical Activity: Unknown (6/19/2024)    Exercise Vital Sign      Days of Exercise per Week: 4 days   Stress: No Stress Concern Present (6/19/2024)    [employer] - Occupational Stress Questionnaire      Feeling of Stress : Not at all   Social Connections: Unknown (6/19/2024)    Social Connection and Isolation Panel [NHANES]      Frequency of Social Gatherings with Friends and Family: Once a week   Interpersonal Safety: Low Risk  (6/19/2024)    Interpersonal Safety      Do you feel physically and emotionally safe where you currently live?: Yes      Within the past 12 months, have you been hit, slapped, kicked or otherwise physically hurt by someone?: No      Within the past 12 months, have you been humiliated or emotionally abused in other ways by your partner or ex-partner?: No   Housing Stability: Unknown (6/19/2024)    Housing Stability      Do you have housing? : Patient declined      Are you worried about losing your housing?: Patient declined       Family History -   Family History   Problem Relation Age of Onset     C.A.D. Father 70        CABG x 3      Respiratory Father         emphysema     Psychotic Disorder Father         depression     Diabetes Mother      Psychotic Disorder Mother         depression     Diabetes Maternal Grandfather      Hypertension Sister      Anxiety Disorder Sister       "Respiratory Sister         emphysema       Review of Systems - As per HPI and PMHx, otherwise review of system review of the head and neck negative. Otherwise 10+ review systems negative.    Physical Exam  BP (!) 140/80   Temp 98.1  F (36.7  C) (Temporal)   Ht 1.635 m (5' 4.37\")   Wt 86.7 kg (191 lb 1.6 oz)   BMI 32.43 kg/m    BMI: Body mass index is 32.43 kg/m .    General - The patient is well nourished and well developed, and appears to have good nutritional status.  Alert and oriented to person and place, answers questions and cooperates with examination appropriately.    SKIN - No suspicious lesions or rashes.  Respiration - No respiratory distress.  Head and Face - Normocephalic and atraumatic, with no gross asymmetry noted of the contour of the facial features.  The facial nerve is intact, with strong symmetric movements.    Voice and Breathing - The patient was breathing comfortably without the use of accessory muscles. The patients voice was clear and strong, and had appropriate pitch and quality.    Ears - Bilateral pinna and EACs with normal appearing overlying skin. Tympanic membrane intact with good mobility on pneumatic otoscopy bilaterally. Bony landmarks of the ossicular chain are normal. The tympanic membranes are normal in appearance. No retraction, perforation, or masses.  No fluid or purulence was seen in the external canal or the middle ear.     Eyes - Extraocular movements intact.  Sclera were not icteric or injected, conjunctiva were pink and moist.    Mouth - Examination of the oral cavity showed pink, healthy oral mucosa. No lesions or ulcerations noted.  The tongue was mobile and midline, and the dentition were in good condition.      Throat - The walls of the oropharynx were smooth, pink, moist, symmetric, and had no lesions or ulcerations.  The tonsillar pillars and soft palate were symmetric.  The uvula was midline on elevation.    Neck - Normal midline excursion of the " laryngotracheal complex during swallowing.  Full range of motion on passive movement.  Palpation of the occipital, submental, submandibular, internal jugular chain, and supraclavicular nodes did not demonstrate any abnormal lymph nodes or masses.  The carotid pulse was palpable bilaterally.  Palpation of the thyroid was soft and smooth, with no nodules or goiter appreciated.  The trachea was mobile and midline.    Nose - External contour is symmetric, no gross deflection or scars.  Nasal mucosa is pink and moist with no abnormal mucus.  The septum was slightly deflected to the left anteriorly and non-obstructive, turbinates of normal size and position.  No polyps, masses, or purulence noted on examination.  Slight deflection was noted to the left of the anterior septum with very prominent vasculature especially 1 blood vessel more anteriorly.    Neuro - Nonfocal neuro exam is normal, CN 2 through 12 intact, normal gait and muscle tone.      Performed in clinic today:  Nasal cautery control epistaxis anterior simple.  Under the guidance of magnified speculum after having placed cotton soaked in Imtiaz-Synephrine lidocaine against the septum anteriorly for 10 minutes on the left I was able to observe prominent blood vessel.  Using silver nitrate I thoroughly cauterized the blood vessel.  Patient tolerated procedure well.      A/P - Negin Jonas is a 80 year old female with epistaxis successfully cauterized today.  No other blood vessels identified.  Patient will use Vaseline topically daily for a week along the spot of cautery.  She will come back as needed.      Virgil Scott MD     Again, thank you for allowing me to participate in the care of your patient.        Sincerely,        Virgil Scott MD, MD    Electronically signed

## 2025-05-12 NOTE — PROGRESS NOTES
ENT Consultation    Negin Jonas is a 80 year old female who is seen in consultation at the request of self.      History of Present Illness - Negin Jonas is a 80 year old female presents for evaluation of POSITIVE for:, epistaxis.    The problem started around 6 weeks ago.  Since then she has had multiple nosebleeds quite severe on the left side.  Gushing blood according to the patient.  Local pressure did help but took a while..  She denies any other bleeding disorders history of easy bruising or bleeding gums.  Does not take any anticoagulants right now.      Past Medical History -   Past Medical History:   Diagnosis Date    Advanced directives, counseling/discussion 3/7/2012    Anxiety attack 2006    EEG normal, improved with lamictal    Chronic rhinitis 1/31/2011    Closed head injury 2004    subarachnoid hemorrage/cerebral contusion with sequellae of significant diminished short term memory, word finding difficulties and emotional lability, was in coma for 2 months, on seizure prophylaxsis with lamictal, f/b neuro annually    Generalized anxiety disorder     Major depression     Major depression in complete remission 1/31/2011    was seen by psychiatrist in past, has tried Effexor and Seroquel and Hiram's Wart    Migraine headache     resolved s/p closed head injury    Panic attacks     Psoriasis 1/31/2011    Short-term memory loss 2004    secondary to closed head injury    Subarachnoid hemorrhage following injury with loss of consciousness and death due to other cause prior to regaining consciousness, initial encounter 6/1/2004       Current Medications -   Current Outpatient Medications:     acetaminophen (TYLENOL) 500 MG tablet, Take 500-1,000 mg by mouth every 6 hours as needed for mild pain, Disp: , Rfl:     citalopram (CELEXA) 20 MG tablet, Take 1 tablet (20 mg) by mouth daily, Disp: 90 tablet, Rfl: 3    fluticasone (FLONASE) 50 MCG/ACT nasal spray, SPRAY 1 SPRAY INTO BOTH NOSTRILS ONCE  DAILY, Disp: 48 mL, Rfl: 1    lamoTRIgine (LAMICTAL) 200 MG tablet, Take 1 tablet (200 mg) by mouth daily Reported on 2/15/2017, Disp: 90 tablet, Rfl: 3    omeprazole (PRILOSEC) 20 MG DR capsule, Take 2 capsules (40 mg) by mouth daily, Disp: 180 capsule, Rfl: 3    triamcinolone (KENALOG) 0.1 % external cream, Apply topically 2 times daily as needed for irritation, Disp: 80 g, Rfl: 1    DULoxetine (CYMBALTA) 30 MG capsule, Take 1 capsule (30 mg) by mouth daily. (Patient not taking: Reported on 5/12/2025), Disp: 30 capsule, Rfl: 1    Allergies -   Allergies   Allergen Reactions    Gabapentin Dizziness, Fatigue and Confusion    Codeine Sulfate     Seasonal Allergies     Zofran [Ondansetron] Other (See Comments)     Pt had possible dystonia reaction to zofran 1/6/23       Social History -   Social History     Socioeconomic History    Marital status:      Spouse name: None    Number of children: None    Years of education: None    Highest education level: None   Tobacco Use    Smoking status: Former     Current packs/day: 1.00     Average packs/day: 1 pack/day for 25.0 years (25.0 ttl pk-yrs)     Types: Cigarettes     Passive exposure: Never    Smokeless tobacco: Never    Tobacco comments:     Quit 15 years ago.   Vaping Use    Vaping status: Never Used   Substance and Sexual Activity    Alcohol use: No    Drug use: No    Sexual activity: Never     Social Drivers of Health     Financial Resource Strain: Unknown (6/19/2024)    Financial Resource Strain     Within the past 12 months, have you or your family members you live with been unable to get utilities (heat, electricity) when it was really needed?: Patient declined   Food Insecurity: Unknown (6/19/2024)    Food Insecurity     Within the past 12 months, did you worry that your food would run out before you got money to buy more?: Patient declined     Within the past 12 months, did the food you bought just not last and you didn t have money to get more?: Patient  "declined   Transportation Needs: Unknown (6/19/2024)    Transportation Needs     Within the past 12 months, has lack of transportation kept you from medical appointments, getting your medicines, non-medical meetings or appointments, work, or from getting things that you need?: Patient declined   Physical Activity: Unknown (6/19/2024)    Exercise Vital Sign     Days of Exercise per Week: 4 days   Stress: No Stress Concern Present (6/19/2024)    [employer] - Occupational Stress Questionnaire     Feeling of Stress : Not at all   Social Connections: Unknown (6/19/2024)    Social Connection and Isolation Panel [NHANES]     Frequency of Social Gatherings with Friends and Family: Once a week   Interpersonal Safety: Low Risk  (6/19/2024)    Interpersonal Safety     Do you feel physically and emotionally safe where you currently live?: Yes     Within the past 12 months, have you been hit, slapped, kicked or otherwise physically hurt by someone?: No     Within the past 12 months, have you been humiliated or emotionally abused in other ways by your partner or ex-partner?: No   Housing Stability: Unknown (6/19/2024)    Housing Stability     Do you have housing? : Patient declined     Are you worried about losing your housing?: Patient declined       Family History -   Family History   Problem Relation Age of Onset    C.A.D. Father 70        CABG x 3     Respiratory Father         emphysema    Psychotic Disorder Father         depression    Diabetes Mother     Psychotic Disorder Mother         depression    Diabetes Maternal Grandfather     Hypertension Sister     Anxiety Disorder Sister     Respiratory Sister         emphysema       Review of Systems - As per HPI and PMHx, otherwise review of system review of the head and neck negative. Otherwise 10+ review systems negative.    Physical Exam  BP (!) 140/80   Temp 98.1  F (36.7  C) (Temporal)   Ht 1.635 m (5' 4.37\")   Wt 86.7 kg (191 lb 1.6 oz)  "  BMI 32.43 kg/m    BMI: Body mass index is 32.43 kg/m .    General - The patient is well nourished and well developed, and appears to have good nutritional status.  Alert and oriented to person and place, answers questions and cooperates with examination appropriately.    SKIN - No suspicious lesions or rashes.  Respiration - No respiratory distress.  Head and Face - Normocephalic and atraumatic, with no gross asymmetry noted of the contour of the facial features.  The facial nerve is intact, with strong symmetric movements.    Voice and Breathing - The patient was breathing comfortably without the use of accessory muscles. The patients voice was clear and strong, and had appropriate pitch and quality.    Ears - Bilateral pinna and EACs with normal appearing overlying skin. Tympanic membrane intact with good mobility on pneumatic otoscopy bilaterally. Bony landmarks of the ossicular chain are normal. The tympanic membranes are normal in appearance. No retraction, perforation, or masses.  No fluid or purulence was seen in the external canal or the middle ear.     Eyes - Extraocular movements intact.  Sclera were not icteric or injected, conjunctiva were pink and moist.    Mouth - Examination of the oral cavity showed pink, healthy oral mucosa. No lesions or ulcerations noted.  The tongue was mobile and midline, and the dentition were in good condition.      Throat - The walls of the oropharynx were smooth, pink, moist, symmetric, and had no lesions or ulcerations.  The tonsillar pillars and soft palate were symmetric.  The uvula was midline on elevation.    Neck - Normal midline excursion of the laryngotracheal complex during swallowing.  Full range of motion on passive movement.  Palpation of the occipital, submental, submandibular, internal jugular chain, and supraclavicular nodes did not demonstrate any abnormal lymph nodes or masses.  The carotid pulse was palpable bilaterally.  Palpation of the thyroid was soft  and smooth, with no nodules or goiter appreciated.  The trachea was mobile and midline.    Nose - External contour is symmetric, no gross deflection or scars.  Nasal mucosa is pink and moist with no abnormal mucus.  The septum was slightly deflected to the left anteriorly and non-obstructive, turbinates of normal size and position.  No polyps, masses, or purulence noted on examination.  Slight deflection was noted to the left of the anterior septum with very prominent vasculature especially 1 blood vessel more anteriorly.    Neuro - Nonfocal neuro exam is normal, CN 2 through 12 intact, normal gait and muscle tone.      Performed in clinic today:  Nasal cautery control epistaxis anterior simple.  Under the guidance of magnified speculum after having placed cotton soaked in Imtiaz-Synephrine lidocaine against the septum anteriorly for 10 minutes on the left I was able to observe prominent blood vessel.  Using silver nitrate I thoroughly cauterized the blood vessel.  Patient tolerated procedure well.      A/P - Negin Jonas is a 80 year old female with epistaxis successfully cauterized today.  No other blood vessels identified.  Patient will use Vaseline topically daily for a week along the spot of cautery.  She will come back as needed.      Virgil Scott MD

## 2025-07-27 ENCOUNTER — HEALTH MAINTENANCE LETTER (OUTPATIENT)
Age: 80
End: 2025-07-27

## 2025-08-13 ENCOUNTER — OFFICE VISIT (OUTPATIENT)
Dept: PODIATRY | Facility: CLINIC | Age: 80
End: 2025-08-13
Payer: COMMERCIAL

## 2025-08-13 ENCOUNTER — ANCILLARY PROCEDURE (OUTPATIENT)
Dept: GENERAL RADIOLOGY | Facility: CLINIC | Age: 80
End: 2025-08-13
Attending: PODIATRIST
Payer: COMMERCIAL

## 2025-08-13 VITALS — HEIGHT: 64 IN | BODY MASS INDEX: 32.27 KG/M2 | WEIGHT: 189 LBS

## 2025-08-13 DIAGNOSIS — S06.9X9S TRAUMATIC BRAIN INJURY WITH LOSS OF CONSCIOUSNESS, SEQUELA: ICD-10-CM

## 2025-08-13 DIAGNOSIS — M20.41 HAMMERTOE OF RIGHT FOOT: Primary | ICD-10-CM

## 2025-08-13 DIAGNOSIS — M19.072 DJD (DEGENERATIVE JOINT DISEASE), ANKLE AND FOOT, LEFT: ICD-10-CM

## 2025-08-13 DIAGNOSIS — M20.41 HAMMERTOE OF RIGHT FOOT: ICD-10-CM

## 2025-08-13 PROCEDURE — 73630 X-RAY EXAM OF FOOT: CPT | Mod: TC | Performed by: RADIOLOGY

## 2025-08-13 PROCEDURE — 1125F AMNT PAIN NOTED PAIN PRSNT: CPT | Performed by: PODIATRIST

## 2025-08-13 PROCEDURE — 99213 OFFICE O/P EST LOW 20 MIN: CPT | Performed by: PODIATRIST

## 2025-08-13 ASSESSMENT — PAIN SCALES - GENERAL: PAINLEVEL_OUTOF10: SEVERE PAIN (8)

## 2025-08-21 DIAGNOSIS — K21.9 GASTROESOPHAGEAL REFLUX DISEASE WITHOUT ESOPHAGITIS: ICD-10-CM

## 2025-08-21 DIAGNOSIS — F41.1 GENERALIZED ANXIETY DISORDER: ICD-10-CM

## 2025-08-21 DIAGNOSIS — F33.41 RECURRENT MAJOR DEPRESSIVE DISORDER, IN PARTIAL REMISSION: ICD-10-CM

## 2025-08-21 RX ORDER — OMEPRAZOLE 20 MG/1
40 CAPSULE, DELAYED RELEASE ORAL DAILY
Qty: 60 CAPSULE | Refills: 0 | Status: SHIPPED | OUTPATIENT
Start: 2025-08-21

## 2025-08-21 RX ORDER — CITALOPRAM HYDROBROMIDE 20 MG/1
20 TABLET ORAL DAILY
Qty: 30 TABLET | Refills: 0 | Status: SHIPPED | OUTPATIENT
Start: 2025-08-21

## (undated) DEVICE — ESU ELEC BLADE 2.75" COATED/INSULATED E1455

## (undated) DEVICE — SUCTION FRAZIER 12FR W/OBTURATOR 33120

## (undated) DEVICE — DRAPE EXTREMITY W/ARMBOARD 29405

## (undated) DEVICE — SOL NACL 0.9% IRRIG 3000ML BAG 07972-08

## (undated) DEVICE — PACK SPINE SM CUSTOM SNE15SSFSK

## (undated) DEVICE — DRAPE COVER C-ARM SEAMLESS SNAP-KAP 03-KP26 LATEX FREE

## (undated) DEVICE — MANIFOLD NEPTUNE 4 PORT 700-20

## (undated) DEVICE — STPL SKIN 35W ROTATING HEAD PRW35

## (undated) DEVICE — PIN DISTRACTION ANCHOR FOR SCR 14MM MDS9091414

## (undated) DEVICE — GLOVE PROTEXIS W/NEU-THERA 8.0  2D73TE80

## (undated) DEVICE — BONE CEMENT MIXEVAC III HI VAC KIT  0206-015-000

## (undated) DEVICE — SU VICRYL 2-0 CT-1 27" UND J259H

## (undated) DEVICE — PATELLA REAMING SYSTEM

## (undated) DEVICE — BNDG ELASTIC 6" DBL LENGTH UNSTERILE 6611-16

## (undated) DEVICE — PREP CHLORAPREP 26ML TINTED ORANGE  260815

## (undated) DEVICE — GLOVE BIOGEL PI MICRO INDICATOR UNDERGLOVE SZ 8.0 48980

## (undated) DEVICE — GLOVE ESTEEM BLUE W/NEU-THERA 8.5  2D73PB85

## (undated) DEVICE — ANTIFOG SOLUTION W/FOAM PAD CF-1001

## (undated) DEVICE — SPONGE SURGIFOAM 100 1974

## (undated) DEVICE — SU VICRYL #2 OS-8 VIOLET 18" J719T

## (undated) DEVICE — ESU GROUND PAD UNIVERSAL W/O CORD

## (undated) DEVICE — DRAIN JACKSON PRATT RESERVOIR 100ML SU130-1305

## (undated) DEVICE — SOL NACL 0.9% IRRIG 1000ML BOTTLE 07138-09

## (undated) DEVICE — Device

## (undated) DEVICE — NDL SPINAL 18GA 3.5" 405184

## (undated) DEVICE — SUCTION TIP YANKAUER ORTHO SUPER SUCKER EFS-111

## (undated) DEVICE — TUBING SUCTION SOFT 20'X3/16" 0036570

## (undated) DEVICE — DRAPE MAYO STAND 23X54 8337

## (undated) DEVICE — PACK TOTAL JOINT STD LATEX

## (undated) DEVICE — DRAPE SHEET REV FOLD 3/4 9349

## (undated) DEVICE — SYR EAR BULB 3OZ 0035830

## (undated) DEVICE — SPONGE COTTONOID 1/2X3" 80-1407

## (undated) DEVICE — STPL SKIN 35W APPOSE 8886803712

## (undated) DEVICE — STRAP POSITIONING VELCRO 13' CERVICAL HARNESS 920877

## (undated) DEVICE — DRSG XEROFORM 1X8"

## (undated) DEVICE — SPONGE RAY-TEC 4X8" 7318

## (undated) DEVICE — SUCTION IRR SYSTEM W/O TIP INTERPULSE HANDPIECE 0210-100-000

## (undated) DEVICE — STAPLE MEDT BONE PREFIX SPINE 2 PRONG 3037000

## (undated) DEVICE — BNDG ESMARK 6" STERILE

## (undated) DEVICE — SU ETHILON 3-0 PS-2 18" 1669H

## (undated) DEVICE — RX SURGIFLO HEMOSTATIC MATRIX W/THROMBIN 8ML 2994

## (undated) DEVICE — BLADE KNIFE SURG 15 371115

## (undated) DEVICE — SU MONOCRYL 4-0 PS-2 18" UND Y496G

## (undated) DEVICE — DRAPE POUCH INSTRUMENT 1018

## (undated) DEVICE — SOL WATER IRRIG 1000ML BOTTLE 2F7114

## (undated) DEVICE — SU VICRYL 3-0 SH CR 8X18" J774

## (undated) DEVICE — CAST PADDING 6" WEBRIL STERILE

## (undated) DEVICE — HOOD FLYTE 0408-800-000

## (undated) DEVICE — PREP DURAPREP 06ML APL 8635

## (undated) DEVICE — SYR 30ML LL W/O NDL

## (undated) DEVICE — STOCKING SLEEVE VASOPRESS COMPRESSION CALF MED VP501M

## (undated) DEVICE — SOL WATER IRRIG 1000ML BOTTLE 07139-09

## (undated) DEVICE — DRILL BIT MEDT 13MM SGL USE 7080513

## (undated) DEVICE — LINEN TOWEL PACK X5 5464

## (undated) DEVICE — CAST PADDING 6" WEBRIL UNSTERILE 3489

## (undated) DEVICE — BONE CLEANING TIP INTERPULSE  0210-010-000

## (undated) DEVICE — DRAPE MICROSCOPE LEICA 54X150" AR8033650

## (undated) DEVICE — GLOVE BIOGEL PI MICRO SZ 7.5 48575

## (undated) DEVICE — NDL ECLIPSE 18GA 1.5"

## (undated) DEVICE — SPONGE KITTNER 31001010

## (undated) DEVICE — GLOVE PROTEXIS W/NEU-THERA 6.5  2D73TE65

## (undated) DEVICE — TOOL DISSECT MIDAS MR8 14CM MATCH HEAD 3MM MR8-14MH30

## (undated) DEVICE — SU ETHIBOND 2 V-37 4X30" MX69G

## (undated) RX ORDER — DIPHENHYDRAMINE HYDROCHLORIDE 50 MG/ML
INJECTION INTRAMUSCULAR; INTRAVENOUS
Status: DISPENSED
Start: 2023-01-06

## (undated) RX ORDER — FENTANYL CITRATE 0.05 MG/ML
INJECTION, SOLUTION INTRAMUSCULAR; INTRAVENOUS
Status: DISPENSED
Start: 2023-01-06

## (undated) RX ORDER — BUPIVACAINE HYDROCHLORIDE AND EPINEPHRINE 5; 5 MG/ML; UG/ML
INJECTION, SOLUTION EPIDURAL; INTRACAUDAL; PERINEURAL
Status: DISPENSED
Start: 2023-01-06

## (undated) RX ORDER — PROPOFOL 10 MG/ML
INJECTION, EMULSION INTRAVENOUS
Status: DISPENSED
Start: 2023-01-06

## (undated) RX ORDER — LIDOCAINE HYDROCHLORIDE 20 MG/ML
INJECTION, SOLUTION EPIDURAL; INFILTRATION; INTRACAUDAL; PERINEURAL
Status: DISPENSED
Start: 2021-03-10

## (undated) RX ORDER — HYDROMORPHONE HCL IN WATER/PF 6 MG/30 ML
PATIENT CONTROLLED ANALGESIA SYRINGE INTRAVENOUS
Status: DISPENSED
Start: 2023-01-06

## (undated) RX ORDER — FENTANYL CITRATE 50 UG/ML
INJECTION, SOLUTION INTRAMUSCULAR; INTRAVENOUS
Status: DISPENSED
Start: 2023-01-06

## (undated) RX ORDER — HYDROMORPHONE HYDROCHLORIDE 1 MG/ML
INJECTION, SOLUTION INTRAMUSCULAR; INTRAVENOUS; SUBCUTANEOUS
Status: DISPENSED
Start: 2021-03-10

## (undated) RX ORDER — GABAPENTIN 100 MG/1
CAPSULE ORAL
Status: DISPENSED
Start: 2023-01-06

## (undated) RX ORDER — FENTANYL CITRATE 50 UG/ML
INJECTION, SOLUTION INTRAMUSCULAR; INTRAVENOUS
Status: DISPENSED
Start: 2021-03-10

## (undated) RX ORDER — HYDROMORPHONE HYDROCHLORIDE 1 MG/ML
INJECTION, SOLUTION INTRAMUSCULAR; INTRAVENOUS; SUBCUTANEOUS
Status: DISPENSED
Start: 2023-01-06

## (undated) RX ORDER — TETRACAINE HCL 10 MG/ML
INJECTION SUBARACHNOID
Status: DISPENSED
Start: 2021-03-10

## (undated) RX ORDER — ONDANSETRON 2 MG/ML
INJECTION INTRAMUSCULAR; INTRAVENOUS
Status: DISPENSED
Start: 2021-03-10

## (undated) RX ORDER — PROPOFOL 10 MG/ML
INJECTION, EMULSION INTRAVENOUS
Status: DISPENSED
Start: 2021-03-10

## (undated) RX ORDER — DEXAMETHASONE SODIUM PHOSPHATE 10 MG/ML
INJECTION, SOLUTION INTRAMUSCULAR; INTRAVENOUS
Status: DISPENSED
Start: 2021-03-10